# Patient Record
Sex: MALE | Race: WHITE | Employment: OTHER
[De-identification: names, ages, dates, MRNs, and addresses within clinical notes are randomized per-mention and may not be internally consistent; named-entity substitution may affect disease eponyms.]

---

## 2017-01-01 ENCOUNTER — HOME CARE VISIT (OUTPATIENT)
Dept: SCHEDULING | Facility: HOME HEALTH | Age: 82
End: 2017-01-01
Payer: MEDICARE

## 2017-01-01 ENCOUNTER — APPOINTMENT (OUTPATIENT)
Dept: GENERAL RADIOLOGY | Age: 82
DRG: 870 | End: 2017-01-01
Attending: PHYSICIAN ASSISTANT
Payer: MEDICARE

## 2017-01-01 ENCOUNTER — APPOINTMENT (OUTPATIENT)
Dept: CT IMAGING | Age: 82
DRG: 870 | End: 2017-01-01
Attending: EMERGENCY MEDICINE
Payer: MEDICARE

## 2017-01-01 ENCOUNTER — HOME CARE VISIT (OUTPATIENT)
Dept: HOSPICE | Facility: HOSPICE | Age: 82
End: 2017-01-01
Payer: MEDICARE

## 2017-01-01 ENCOUNTER — APPOINTMENT (OUTPATIENT)
Dept: GENERAL RADIOLOGY | Age: 82
DRG: 870 | End: 2017-01-01
Attending: HOSPITALIST
Payer: MEDICARE

## 2017-01-01 ENCOUNTER — APPOINTMENT (OUTPATIENT)
Dept: GENERAL RADIOLOGY | Age: 82
DRG: 870 | End: 2017-01-01
Attending: EMERGENCY MEDICINE
Payer: MEDICARE

## 2017-01-01 ENCOUNTER — HOSPITAL ENCOUNTER (INPATIENT)
Age: 82
LOS: 7 days | Discharge: NURSING, REHAB OR LONG-TERM CARE | DRG: 870 | End: 2017-03-13
Attending: EMERGENCY MEDICINE | Admitting: HOSPITALIST
Payer: MEDICARE

## 2017-01-01 ENCOUNTER — HOSPICE ADMISSION (OUTPATIENT)
Dept: HOSPICE | Facility: HOSPICE | Age: 82
End: 2017-01-01
Payer: MEDICARE

## 2017-01-01 ENCOUNTER — APPOINTMENT (OUTPATIENT)
Dept: ULTRASOUND IMAGING | Age: 82
DRG: 870 | End: 2017-01-01
Attending: INTERNAL MEDICINE
Payer: MEDICARE

## 2017-01-01 VITALS
OXYGEN SATURATION: 98 % | RESPIRATION RATE: 16 BRPM | OXYGEN SATURATION: 84 % | RESPIRATION RATE: 14 BRPM | TEMPERATURE: 98.2 F | DIASTOLIC BLOOD PRESSURE: 42 MMHG | DIASTOLIC BLOOD PRESSURE: 50 MMHG | SYSTOLIC BLOOD PRESSURE: 80 MMHG | TEMPERATURE: 98.2 F | SYSTOLIC BLOOD PRESSURE: 90 MMHG | HEART RATE: 88 BPM | HEART RATE: 68 BPM

## 2017-01-01 VITALS
TEMPERATURE: 98.4 F | SYSTOLIC BLOOD PRESSURE: 110 MMHG | HEIGHT: 74 IN | HEART RATE: 79 BPM | OXYGEN SATURATION: 99 % | WEIGHT: 158.51 LBS | RESPIRATION RATE: 16 BRPM | DIASTOLIC BLOOD PRESSURE: 83 MMHG | BODY MASS INDEX: 20.34 KG/M2

## 2017-01-01 DIAGNOSIS — G93.40 ENCEPHALOPATHY ACUTE: ICD-10-CM

## 2017-01-01 DIAGNOSIS — A41.9 SEPSIS, DUE TO UNSPECIFIED ORGANISM: Primary | ICD-10-CM

## 2017-01-01 DIAGNOSIS — R53.81 DEBILITY: ICD-10-CM

## 2017-01-01 DIAGNOSIS — N30.01 ACUTE CYSTITIS WITH HEMATURIA: ICD-10-CM

## 2017-01-01 DIAGNOSIS — J96.90 RESPIRATORY FAILURE, UNSPECIFIED CHRONICITY, UNSPECIFIED WHETHER WITH HYPOXIA OR HYPERCAPNIA (HCC): ICD-10-CM

## 2017-01-01 DIAGNOSIS — R41.89 UNRESPONSIVE: ICD-10-CM

## 2017-01-01 LAB
ABO + RH BLD: NORMAL
ALBUMIN SERPL BCP-MCNC: 1.7 G/DL (ref 3.4–5)
ALBUMIN SERPL BCP-MCNC: 2 G/DL (ref 3.4–5)
ALBUMIN SERPL BCP-MCNC: 2.1 G/DL (ref 3.4–5)
ALBUMIN SERPL BCP-MCNC: 3.1 G/DL (ref 3.4–5)
ALBUMIN/GLOB SERPL: 0.6 {RATIO} (ref 0.8–1.7)
ALBUMIN/GLOB SERPL: 0.7 {RATIO} (ref 0.8–1.7)
ALBUMIN/GLOB SERPL: 0.8 {RATIO} (ref 0.8–1.7)
ALBUMIN/GLOB SERPL: 0.8 {RATIO} (ref 0.8–1.7)
ALP SERPL-CCNC: 103 U/L (ref 45–117)
ALP SERPL-CCNC: 69 U/L (ref 45–117)
ALP SERPL-CCNC: 79 U/L (ref 45–117)
ALP SERPL-CCNC: 95 U/L (ref 45–117)
ALT SERPL-CCNC: 35 U/L (ref 16–61)
ALT SERPL-CCNC: 36 U/L (ref 16–61)
ALT SERPL-CCNC: 40 U/L (ref 16–61)
ALT SERPL-CCNC: 51 U/L (ref 16–61)
AMMONIA PLAS-SCNC: 34 UMOL/L (ref 11–32)
ANION GAP BLD CALC-SCNC: 11 MMOL/L (ref 3–18)
ANION GAP BLD CALC-SCNC: 14 MMOL/L (ref 3–18)
ANION GAP BLD CALC-SCNC: 18 MMOL/L (ref 10–20)
ANION GAP BLD CALC-SCNC: 7 MMOL/L (ref 3–18)
ANION GAP BLD CALC-SCNC: 9 MMOL/L (ref 3–18)
APPEARANCE UR: ABNORMAL
ARTERIAL PATENCY WRIST A: YES
AST SERPL W P-5'-P-CCNC: 36 U/L (ref 15–37)
AST SERPL W P-5'-P-CCNC: 39 U/L (ref 15–37)
AST SERPL W P-5'-P-CCNC: 43 U/L (ref 15–37)
AST SERPL W P-5'-P-CCNC: 48 U/L (ref 15–37)
ATRIAL RATE: 166 BPM
ATRIAL RATE: 65 BPM
BACTERIA SPEC CULT: ABNORMAL
BACTERIA SPEC CULT: NORMAL
BACTERIA SPEC CULT: NORMAL
BACTERIA URNS QL MICRO: ABNORMAL /HPF
BASE DEFICIT BLD-SCNC: 11 MMOL/L
BASE DEFICIT BLD-SCNC: 12 MMOL/L
BASE DEFICIT BLD-SCNC: 5 MMOL/L
BASE DEFICIT BLD-SCNC: 7 MMOL/L
BASOPHILS # BLD AUTO: 0 K/UL (ref 0–0.06)
BASOPHILS # BLD: 0 % (ref 0–2)
BDY SITE: ABNORMAL
BILIRUB SERPL-MCNC: 0.6 MG/DL (ref 0.2–1)
BILIRUB SERPL-MCNC: 0.7 MG/DL (ref 0.2–1)
BILIRUB SERPL-MCNC: 0.8 MG/DL (ref 0.2–1)
BILIRUB SERPL-MCNC: 0.8 MG/DL (ref 0.2–1)
BILIRUB UR QL: NEGATIVE
BLOOD GROUP ANTIBODIES SERPL: NORMAL
BODY TEMPERATURE: 96.8
BODY TEMPERATURE: 98.4
BODY TEMPERATURE: 98.8
BODY TEMPERATURE: 98.8
BUN BLD-MCNC: 84 MG/DL (ref 7–18)
BUN SERPL-MCNC: 19 MG/DL (ref 7–18)
BUN SERPL-MCNC: 22 MG/DL (ref 7–18)
BUN SERPL-MCNC: 35 MG/DL (ref 7–18)
BUN SERPL-MCNC: 53 MG/DL (ref 7–18)
BUN SERPL-MCNC: 81 MG/DL (ref 7–18)
BUN SERPL-MCNC: 87 MG/DL (ref 7–18)
BUN/CREAT SERPL: 17 (ref 12–20)
BUN/CREAT SERPL: 19 (ref 12–20)
BUN/CREAT SERPL: 23 (ref 12–20)
BUN/CREAT SERPL: 25 (ref 12–20)
BUN/CREAT SERPL: 27 (ref 12–20)
BUN/CREAT SERPL: 31 (ref 12–20)
CA-I BLD-MCNC: 1.15 MMOL/L (ref 1.12–1.32)
CALCIUM SERPL-MCNC: 7.2 MG/DL (ref 8.5–10.1)
CALCIUM SERPL-MCNC: 7.5 MG/DL (ref 8.5–10.1)
CALCIUM SERPL-MCNC: 7.5 MG/DL (ref 8.5–10.1)
CALCIUM SERPL-MCNC: 7.7 MG/DL (ref 8.5–10.1)
CALCIUM SERPL-MCNC: 7.9 MG/DL (ref 8.5–10.1)
CALCIUM SERPL-MCNC: 9.8 MG/DL (ref 8.5–10.1)
CALCULATED P AXIS, ECG09: 58 DEGREES
CALCULATED P AXIS, ECG09: 87 DEGREES
CALCULATED R AXIS, ECG10: -2 DEGREES
CALCULATED R AXIS, ECG10: -38 DEGREES
CALCULATED T AXIS, ECG11: -160 DEGREES
CALCULATED T AXIS, ECG11: 74 DEGREES
CHLORIDE BLD-SCNC: 126 MMOL/L (ref 100–108)
CHLORIDE SERPL-SCNC: 111 MMOL/L (ref 100–108)
CHLORIDE SERPL-SCNC: 112 MMOL/L (ref 100–108)
CHLORIDE SERPL-SCNC: 121 MMOL/L (ref 100–108)
CHLORIDE SERPL-SCNC: 125 MMOL/L (ref 100–108)
CHLORIDE SERPL-SCNC: 125 MMOL/L (ref 100–108)
CHLORIDE SERPL-SCNC: 133 MMOL/L (ref 100–108)
CK MB CFR SERPL CALC: 3.3 % (ref 0–4)
CK MB CFR SERPL CALC: 4.1 % (ref 0–4)
CK MB CFR SERPL CALC: 4.6 % (ref 0–4)
CK MB SERPL-MCNC: 10.5 NG/ML (ref 5–25)
CK MB SERPL-MCNC: 11.8 NG/ML (ref 5–25)
CK MB SERPL-MCNC: 7.6 NG/ML (ref 5–25)
CK SERPL-CCNC: 232 U/L (ref 39–308)
CK SERPL-CCNC: 255 U/L (ref 39–308)
CK SERPL-CCNC: 258 U/L (ref 39–308)
CO2 BLD-SCNC: 19 MMOL/L (ref 19–24)
CO2 SERPL-SCNC: 17 MMOL/L (ref 21–32)
CO2 SERPL-SCNC: 19 MMOL/L (ref 21–32)
CO2 SERPL-SCNC: 19 MMOL/L (ref 21–32)
CO2 SERPL-SCNC: 22 MMOL/L (ref 21–32)
CO2 SERPL-SCNC: 25 MMOL/L (ref 21–32)
CO2 SERPL-SCNC: 26 MMOL/L (ref 21–32)
COLOR UR: ABNORMAL
CREAT SERPL-MCNC: 1.15 MG/DL (ref 0.6–1.3)
CREAT SERPL-MCNC: 1.16 MG/DL (ref 0.6–1.3)
CREAT SERPL-MCNC: 1.45 MG/DL (ref 0.6–1.3)
CREAT SERPL-MCNC: 2.16 MG/DL (ref 0.6–1.3)
CREAT SERPL-MCNC: 2.59 MG/DL (ref 0.6–1.3)
CREAT SERPL-MCNC: 3.24 MG/DL (ref 0.6–1.3)
CREAT UR-MCNC: 3 MG/DL (ref 0.6–1.3)
CREAT UR-MCNC: 52.9 MG/DL (ref 30–125)
DATE LAST DOSE: NORMAL
DIAGNOSIS, 93000: NORMAL
DIAGNOSIS, 93000: NORMAL
DIFFERENTIAL METHOD BLD: ABNORMAL
EOSINOPHIL # BLD: 0 K/UL (ref 0–0.4)
EOSINOPHIL # BLD: 0.2 K/UL (ref 0–0.4)
EOSINOPHIL # BLD: 0.4 K/UL (ref 0–0.4)
EOSINOPHIL # BLD: 0.4 K/UL (ref 0–0.4)
EOSINOPHIL NFR BLD: 0 % (ref 0–5)
EOSINOPHIL NFR BLD: 2 % (ref 0–5)
EOSINOPHIL NFR BLD: 5 % (ref 0–5)
EOSINOPHIL NFR BLD: 5 % (ref 0–5)
EPITH CASTS URNS QL MICRO: ABNORMAL /LPF (ref 0–5)
ERYTHROCYTE [DISTWIDTH] IN BLOOD BY AUTOMATED COUNT: 13.7 % (ref 11.6–14.5)
ERYTHROCYTE [DISTWIDTH] IN BLOOD BY AUTOMATED COUNT: 13.7 % (ref 11.6–14.5)
ERYTHROCYTE [DISTWIDTH] IN BLOOD BY AUTOMATED COUNT: 13.8 % (ref 11.6–14.5)
ERYTHROCYTE [DISTWIDTH] IN BLOOD BY AUTOMATED COUNT: 13.9 % (ref 11.6–14.5)
ERYTHROCYTE [DISTWIDTH] IN BLOOD BY AUTOMATED COUNT: 13.9 % (ref 11.6–14.5)
ERYTHROCYTE [DISTWIDTH] IN BLOOD BY AUTOMATED COUNT: 14.1 % (ref 11.6–14.5)
EST. AVERAGE GLUCOSE BLD GHB EST-MCNC: 131 MG/DL
GAS FLOW.O2 O2 DELIVERY SYS: ABNORMAL L/MIN
GAS FLOW.O2 SETTING OXYMISER: 10 BPM
GLOBULIN SER CALC-MCNC: 2.8 G/DL (ref 2–4)
GLOBULIN SER CALC-MCNC: 3 G/DL (ref 2–4)
GLOBULIN SER CALC-MCNC: 3 G/DL (ref 2–4)
GLOBULIN SER CALC-MCNC: 3.9 G/DL (ref 2–4)
GLUCOSE BLD STRIP.AUTO-MCNC: 108 MG/DL (ref 70–110)
GLUCOSE BLD STRIP.AUTO-MCNC: 112 MG/DL (ref 70–110)
GLUCOSE BLD STRIP.AUTO-MCNC: 118 MG/DL (ref 70–110)
GLUCOSE BLD STRIP.AUTO-MCNC: 135 MG/DL (ref 70–110)
GLUCOSE BLD STRIP.AUTO-MCNC: 138 MG/DL (ref 70–110)
GLUCOSE BLD STRIP.AUTO-MCNC: 140 MG/DL (ref 70–110)
GLUCOSE BLD STRIP.AUTO-MCNC: 141 MG/DL (ref 70–110)
GLUCOSE BLD STRIP.AUTO-MCNC: 144 MG/DL (ref 70–110)
GLUCOSE BLD STRIP.AUTO-MCNC: 153 MG/DL (ref 70–110)
GLUCOSE BLD STRIP.AUTO-MCNC: 158 MG/DL (ref 70–110)
GLUCOSE BLD STRIP.AUTO-MCNC: 159 MG/DL (ref 70–110)
GLUCOSE BLD STRIP.AUTO-MCNC: 161 MG/DL (ref 74–106)
GLUCOSE BLD STRIP.AUTO-MCNC: 163 MG/DL (ref 70–110)
GLUCOSE BLD STRIP.AUTO-MCNC: 167 MG/DL (ref 70–110)
GLUCOSE BLD STRIP.AUTO-MCNC: 174 MG/DL (ref 70–110)
GLUCOSE BLD STRIP.AUTO-MCNC: 175 MG/DL (ref 70–110)
GLUCOSE BLD STRIP.AUTO-MCNC: 203 MG/DL (ref 70–110)
GLUCOSE BLD STRIP.AUTO-MCNC: 237 MG/DL (ref 70–110)
GLUCOSE BLD STRIP.AUTO-MCNC: 90 MG/DL (ref 70–110)
GLUCOSE SERPL-MCNC: 113 MG/DL (ref 74–99)
GLUCOSE SERPL-MCNC: 121 MG/DL (ref 74–99)
GLUCOSE SERPL-MCNC: 136 MG/DL (ref 74–99)
GLUCOSE SERPL-MCNC: 146 MG/DL (ref 74–99)
GLUCOSE SERPL-MCNC: 157 MG/DL (ref 74–99)
GLUCOSE SERPL-MCNC: 175 MG/DL (ref 74–99)
GLUCOSE UR STRIP.AUTO-MCNC: NEGATIVE MG/DL
GRAM STN SPEC: ABNORMAL
HBA1C MFR BLD: 6.2 % (ref 4.2–5.6)
HCO3 BLD-SCNC: 14.4 MMOL/L (ref 22–26)
HCO3 BLD-SCNC: 16.4 MMOL/L (ref 22–26)
HCO3 BLD-SCNC: 18.7 MMOL/L (ref 22–26)
HCO3 BLD-SCNC: 19.6 MMOL/L (ref 22–26)
HCT VFR BLD AUTO: 32.2 % (ref 36–48)
HCT VFR BLD AUTO: 33.8 % (ref 36–48)
HCT VFR BLD AUTO: 33.8 % (ref 36–48)
HCT VFR BLD AUTO: 33.9 % (ref 36–48)
HCT VFR BLD AUTO: 38.7 % (ref 36–48)
HCT VFR BLD AUTO: 43.3 % (ref 36–48)
HCT VFR BLD CALC: 42 % (ref 36–49)
HGB BLD-MCNC: 10.7 G/DL (ref 13–16)
HGB BLD-MCNC: 10.8 G/DL (ref 13–16)
HGB BLD-MCNC: 11.1 G/DL (ref 13–16)
HGB BLD-MCNC: 11.2 G/DL (ref 13–16)
HGB BLD-MCNC: 12.2 G/DL (ref 13–16)
HGB BLD-MCNC: 14 G/DL (ref 13–16)
HGB BLD-MCNC: 14.3 G/DL (ref 12–16)
HGB UR QL STRIP: ABNORMAL
INR PPP: 1.4 (ref 0.8–1.2)
INR PPP: 1.4 (ref 0.8–1.2)
INSPIRATION.DURATION SETTING TIME VENT: 1 SEC
INSPIRATION.DURATION SETTING TIME VENT: 30 SEC
KETONES UR QL STRIP.AUTO: ABNORMAL MG/DL
LACTATE BLD-SCNC: 1 MMOL/L (ref 0.4–2)
LACTATE BLD-SCNC: 4 MMOL/L (ref 0.4–2)
LEUKOCYTE ESTERASE UR QL STRIP.AUTO: ABNORMAL
LYMPHOCYTES # BLD AUTO: 14 % (ref 21–52)
LYMPHOCYTES # BLD AUTO: 15 % (ref 21–52)
LYMPHOCYTES # BLD AUTO: 16 % (ref 21–52)
LYMPHOCYTES # BLD AUTO: 19 % (ref 21–52)
LYMPHOCYTES # BLD: 1.3 K/UL (ref 0.9–3.6)
LYMPHOCYTES # BLD: 1.3 K/UL (ref 0.9–3.6)
LYMPHOCYTES # BLD: 1.4 K/UL (ref 0.9–3.6)
LYMPHOCYTES # BLD: 1.7 K/UL (ref 0.9–3.6)
MAGNESIUM SERPL-MCNC: 1.8 MG/DL (ref 1.8–2.4)
MAGNESIUM SERPL-MCNC: 2.1 MG/DL (ref 1.8–2.4)
MAGNESIUM SERPL-MCNC: 2.1 MG/DL (ref 1.8–2.4)
MAGNESIUM SERPL-MCNC: 2.2 MG/DL (ref 1.8–2.4)
MAGNESIUM SERPL-MCNC: 2.3 MG/DL (ref 1.8–2.4)
MAGNESIUM SERPL-MCNC: 3 MG/DL (ref 1.8–2.4)
MCH RBC QN AUTO: 32 PG (ref 24–34)
MCH RBC QN AUTO: 32.1 PG (ref 24–34)
MCH RBC QN AUTO: 32.2 PG (ref 24–34)
MCH RBC QN AUTO: 32.3 PG (ref 24–34)
MCH RBC QN AUTO: 32.7 PG (ref 24–34)
MCH RBC QN AUTO: 32.9 PG (ref 24–34)
MCHC RBC AUTO-ENTMCNC: 31.5 G/DL (ref 31–37)
MCHC RBC AUTO-ENTMCNC: 32 G/DL (ref 31–37)
MCHC RBC AUTO-ENTMCNC: 32.3 G/DL (ref 31–37)
MCHC RBC AUTO-ENTMCNC: 32.8 G/DL (ref 31–37)
MCHC RBC AUTO-ENTMCNC: 33 G/DL (ref 31–37)
MCHC RBC AUTO-ENTMCNC: 33.2 G/DL (ref 31–37)
MCV RBC AUTO: 100.6 FL (ref 74–97)
MCV RBC AUTO: 101.6 FL (ref 74–97)
MCV RBC AUTO: 101.6 FL (ref 74–97)
MCV RBC AUTO: 97.3 FL (ref 74–97)
MCV RBC AUTO: 97.4 FL (ref 74–97)
MCV RBC AUTO: 99.7 FL (ref 74–97)
MONOCYTES # BLD: 0.5 K/UL (ref 0.05–1.2)
MONOCYTES # BLD: 0.5 K/UL (ref 0.05–1.2)
MONOCYTES # BLD: 0.6 K/UL (ref 0.05–1.2)
MONOCYTES # BLD: 0.6 K/UL (ref 0.05–1.2)
MONOCYTES NFR BLD AUTO: 6 % (ref 3–10)
MONOCYTES NFR BLD AUTO: 6 % (ref 3–10)
MONOCYTES NFR BLD AUTO: 7 % (ref 3–10)
MONOCYTES NFR BLD AUTO: 7 % (ref 3–10)
NEUTS SEG # BLD: 5.5 K/UL (ref 1.8–8)
NEUTS SEG # BLD: 6 K/UL (ref 1.8–8)
NEUTS SEG # BLD: 6.2 K/UL (ref 1.8–8)
NEUTS SEG # BLD: 8 K/UL (ref 1.8–8)
NEUTS SEG NFR BLD AUTO: 72 % (ref 40–73)
NEUTS SEG NFR BLD AUTO: 72 % (ref 40–73)
NEUTS SEG NFR BLD AUTO: 74 % (ref 40–73)
NEUTS SEG NFR BLD AUTO: 80 % (ref 40–73)
NITRITE UR QL STRIP.AUTO: NEGATIVE
O2/TOTAL GAS SETTING VFR VENT: 0.3 %
O2/TOTAL GAS SETTING VFR VENT: 30 %
O2/TOTAL GAS SETTING VFR VENT: 30 %
O2/TOTAL GAS SETTING VFR VENT: 60 %
P-R INTERVAL, ECG05: 130 MS
P-R INTERVAL, ECG05: 170 MS
PCO2 BLD: 30 MMHG (ref 35–45)
PCO2 BLD: 32.4 MMHG (ref 35–45)
PCO2 BLD: 33 MMHG (ref 35–45)
PCO2 BLD: 36.1 MMHG (ref 35–45)
PEEP RESPIRATORY: 5 CMH2O
PH BLD: 7.26 [PH] (ref 7.35–7.45)
PH BLD: 7.29 [PH] (ref 7.35–7.45)
PH BLD: 7.36 [PH] (ref 7.35–7.45)
PH BLD: 7.39 [PH] (ref 7.35–7.45)
PH UR STRIP: >8.5 [PH] (ref 5–8)
PHOSPHATE SERPL-MCNC: 1.4 MG/DL (ref 2.5–4.9)
PHOSPHATE SERPL-MCNC: 1.5 MG/DL (ref 2.5–4.9)
PHOSPHATE SERPL-MCNC: 1.6 MG/DL (ref 2.5–4.9)
PHOSPHATE SERPL-MCNC: 1.7 MG/DL (ref 2.5–4.9)
PHOSPHATE SERPL-MCNC: 2 MG/DL (ref 2.5–4.9)
PIP ISTAT,IPIP: 11
PLATELET # BLD AUTO: 149 K/UL (ref 135–420)
PLATELET # BLD AUTO: 151 K/UL (ref 135–420)
PLATELET # BLD AUTO: 161 K/UL (ref 135–420)
PLATELET # BLD AUTO: 164 K/UL (ref 135–420)
PLATELET # BLD AUTO: 177 K/UL (ref 135–420)
PLATELET # BLD AUTO: 232 K/UL (ref 135–420)
PMV BLD AUTO: 10.9 FL (ref 9.2–11.8)
PMV BLD AUTO: 11 FL (ref 9.2–11.8)
PMV BLD AUTO: 11.2 FL (ref 9.2–11.8)
PMV BLD AUTO: 11.3 FL (ref 9.2–11.8)
PMV BLD AUTO: 11.3 FL (ref 9.2–11.8)
PMV BLD AUTO: 11.4 FL (ref 9.2–11.8)
PO2 BLD: 136 MMHG (ref 80–100)
PO2 BLD: 144 MMHG (ref 80–100)
PO2 BLD: 151 MMHG (ref 80–100)
PO2 BLD: 345 MMHG (ref 80–100)
POTASSIUM BLD-SCNC: 4.3 MMOL/L (ref 3.5–5.5)
POTASSIUM SERPL-SCNC: 2.9 MMOL/L (ref 3.5–5.5)
POTASSIUM SERPL-SCNC: 3 MMOL/L (ref 3.5–5.5)
POTASSIUM SERPL-SCNC: 3.1 MMOL/L (ref 3.5–5.5)
POTASSIUM SERPL-SCNC: 3.6 MMOL/L (ref 3.5–5.5)
POTASSIUM SERPL-SCNC: 3.7 MMOL/L (ref 3.5–5.5)
POTASSIUM SERPL-SCNC: 3.8 MMOL/L (ref 3.5–5.5)
POTASSIUM SERPL-SCNC: 4.1 MMOL/L (ref 3.5–5.5)
POTASSIUM SERPL-SCNC: 4.4 MMOL/L (ref 3.5–5.5)
POTASSIUM SERPL-SCNC: 5 MMOL/L (ref 3.5–5.5)
PROT SERPL-MCNC: 4.7 G/DL (ref 6.4–8.2)
PROT SERPL-MCNC: 4.9 G/DL (ref 6.4–8.2)
PROT SERPL-MCNC: 5 G/DL (ref 6.4–8.2)
PROT SERPL-MCNC: 7 G/DL (ref 6.4–8.2)
PROT UR STRIP-MCNC: 300 MG/DL
PROTHROMBIN TIME: 16.9 SEC (ref 11.5–15.2)
PROTHROMBIN TIME: 17 SEC (ref 11.5–15.2)
Q-T INTERVAL, ECG07: 250 MS
Q-T INTERVAL, ECG07: 498 MS
QRS DURATION, ECG06: 106 MS
QRS DURATION, ECG06: 110 MS
QTC CALCULATION (BEZET), ECG08: 410 MS
QTC CALCULATION (BEZET), ECG08: 517 MS
RBC # BLD AUTO: 3.31 M/UL (ref 4.7–5.5)
RBC # BLD AUTO: 3.36 M/UL (ref 4.7–5.5)
RBC # BLD AUTO: 3.39 M/UL (ref 4.7–5.5)
RBC # BLD AUTO: 3.48 M/UL (ref 4.7–5.5)
RBC # BLD AUTO: 3.81 M/UL (ref 4.7–5.5)
RBC # BLD AUTO: 4.26 M/UL (ref 4.7–5.5)
RBC #/AREA URNS HPF: ABNORMAL /HPF (ref 0–5)
REPORTED DOSE,DOSE: NORMAL UNITS
REPORTED DOSE/TIME,TMG: 1400
SAO2 % BLD: 100 % (ref 92–97)
SAO2 % BLD: 99 % (ref 92–97)
SERVICE CMNT-IMP: ABNORMAL
SERVICE CMNT-IMP: NORMAL
SERVICE CMNT-IMP: NORMAL
SODIUM BLD-SCNC: 158 MMOL/L (ref 136–145)
SODIUM SERPL-SCNC: 144 MMOL/L (ref 136–145)
SODIUM SERPL-SCNC: 144 MMOL/L (ref 136–145)
SODIUM SERPL-SCNC: 150 MMOL/L (ref 136–145)
SODIUM SERPL-SCNC: 155 MMOL/L (ref 136–145)
SODIUM SERPL-SCNC: 156 MMOL/L (ref 136–145)
SODIUM SERPL-SCNC: 161 MMOL/L (ref 136–145)
SODIUM SERPL-SCNC: 162 MMOL/L (ref 136–145)
SODIUM UR-SCNC: 107 MMOL/L (ref 20–110)
SP GR UR REFRACTOMETRY: 1.02 (ref 1–1.03)
SPECIMEN EXP DATE BLD: NORMAL
SPECIMEN TYPE: ABNORMAL
T4 FREE SERPL-MCNC: 1 NG/DL (ref 0.7–1.5)
TOTAL RESP. RATE, ITRR: 13
TOTAL RESP. RATE, ITRR: 17
TOTAL RESP. RATE, ITRR: 18
TOTAL RESP. RATE, ITRR: 22
TROPONIN I BLD-MCNC: 0.04 NG/ML (ref 0–0.08)
TROPONIN I SERPL-MCNC: 0.69 NG/ML (ref 0–0.04)
TROPONIN I SERPL-MCNC: 0.97 NG/ML (ref 0–0.04)
TROPONIN I SERPL-MCNC: 1.21 NG/ML (ref 0–0.04)
TROPONIN I SERPL-MCNC: 1.39 NG/ML (ref 0–0.04)
TSH SERPL DL<=0.05 MIU/L-ACNC: 3.08 UIU/ML (ref 0.36–3.74)
UROBILINOGEN UR QL STRIP.AUTO: 1 EU/DL (ref 0.2–1)
VANCOMYCIN TROUGH SERPL-MCNC: 12.7 UG/ML (ref 10–20)
VENTILATION MODE VENT: ABNORMAL
VENTRICULAR RATE, ECG03: 162 BPM
VENTRICULAR RATE, ECG03: 65 BPM
VOLUME CONTROL PLUS IVLCP: YES
VT SETTING VENT: 400 ML
VT SETTING VENT: 450 ML
VT SETTING VENT: 450 ML
VT SETTING VENT: 500 ML
WBC # BLD AUTO: 10 K/UL (ref 4.6–13.2)
WBC # BLD AUTO: 7.7 K/UL (ref 4.6–13.2)
WBC # BLD AUTO: 7.8 K/UL (ref 4.6–13.2)
WBC # BLD AUTO: 8.1 K/UL (ref 4.6–13.2)
WBC # BLD AUTO: 8.7 K/UL (ref 4.6–13.2)
WBC # BLD AUTO: 9.4 K/UL (ref 4.6–13.2)
WBC URNS QL MICRO: ABNORMAL /HPF (ref 0–4)

## 2017-01-01 PROCEDURE — 74011250636 HC RX REV CODE- 250/636: Performed by: INTERNAL MEDICINE

## 2017-01-01 PROCEDURE — 77030018786 HC NDL GD F/USND BARD -B

## 2017-01-01 PROCEDURE — 74000 XR ABD PORT  1 V: CPT

## 2017-01-01 PROCEDURE — 74011250637 HC RX REV CODE- 250/637: Performed by: HOSPITALIST

## 2017-01-01 PROCEDURE — 83735 ASSAY OF MAGNESIUM: CPT | Performed by: HOSPITALIST

## 2017-01-01 PROCEDURE — 87077 CULTURE AEROBIC IDENTIFY: CPT | Performed by: PHYSICIAN ASSISTANT

## 2017-01-01 PROCEDURE — 65270000029 HC RM PRIVATE

## 2017-01-01 PROCEDURE — 02HV33Z INSERTION OF INFUSION DEVICE INTO SUPERIOR VENA CAVA, PERCUTANEOUS APPROACH: ICD-10-PCS | Performed by: HOSPITALIST

## 2017-01-01 PROCEDURE — 81001 URINALYSIS AUTO W/SCOPE: CPT | Performed by: EMERGENCY MEDICINE

## 2017-01-01 PROCEDURE — 77030008771 HC TU NG SALEM SUMP -A

## 2017-01-01 PROCEDURE — 94002 VENT MGMT INPAT INIT DAY: CPT

## 2017-01-01 PROCEDURE — 36592 COLLECT BLOOD FROM PICC: CPT

## 2017-01-01 PROCEDURE — 94003 VENT MGMT INPAT SUBQ DAY: CPT

## 2017-01-01 PROCEDURE — 74011250636 HC RX REV CODE- 250/636: Performed by: HOSPITALIST

## 2017-01-01 PROCEDURE — 80053 COMPREHEN METABOLIC PANEL: CPT | Performed by: EMERGENCY MEDICINE

## 2017-01-01 PROCEDURE — 96365 THER/PROPH/DIAG IV INF INIT: CPT

## 2017-01-01 PROCEDURE — 77030018719 HC DRSG PTCH ANTIMIC J&J -A

## 2017-01-01 PROCEDURE — 84300 ASSAY OF URINE SODIUM: CPT | Performed by: INTERNAL MEDICINE

## 2017-01-01 PROCEDURE — 5A1955Z RESPIRATORY VENTILATION, GREATER THAN 96 CONSECUTIVE HOURS: ICD-10-PCS | Performed by: HOSPITALIST

## 2017-01-01 PROCEDURE — 74011000250 HC RX REV CODE- 250: Performed by: INTERNAL MEDICINE

## 2017-01-01 PROCEDURE — 83735 ASSAY OF MAGNESIUM: CPT | Performed by: PHYSICIAN ASSISTANT

## 2017-01-01 PROCEDURE — 85027 COMPLETE CBC AUTOMATED: CPT | Performed by: PHYSICIAN ASSISTANT

## 2017-01-01 PROCEDURE — 0651 HSPC ROUTINE HOME CARE

## 2017-01-01 PROCEDURE — 36600 WITHDRAWAL OF ARTERIAL BLOOD: CPT

## 2017-01-01 PROCEDURE — 80053 COMPREHEN METABOLIC PANEL: CPT | Performed by: HOSPITALIST

## 2017-01-01 PROCEDURE — 3336590001 HSPC ROOM AND BOARD

## 2017-01-01 PROCEDURE — 74011000250 HC RX REV CODE- 250: Performed by: HOSPITALIST

## 2017-01-01 PROCEDURE — 96368 THER/DIAG CONCURRENT INF: CPT

## 2017-01-01 PROCEDURE — 65610000006 HC RM INTENSIVE CARE

## 2017-01-01 PROCEDURE — 82570 ASSAY OF URINE CREATININE: CPT | Performed by: INTERNAL MEDICINE

## 2017-01-01 PROCEDURE — 70450 CT HEAD/BRAIN W/O DYE: CPT

## 2017-01-01 PROCEDURE — 77030011256 HC DRSG MEPILEX <16IN NO BORD MOLN -A

## 2017-01-01 PROCEDURE — 74011636637 HC RX REV CODE- 636/637: Performed by: HOSPITALIST

## 2017-01-01 PROCEDURE — C9113 INJ PANTOPRAZOLE SODIUM, VIA: HCPCS | Performed by: INTERNAL MEDICINE

## 2017-01-01 PROCEDURE — 71010 XR CHEST PORT: CPT

## 2017-01-01 PROCEDURE — 85027 COMPLETE CBC AUTOMATED: CPT | Performed by: INTERNAL MEDICINE

## 2017-01-01 PROCEDURE — 82962 GLUCOSE BLOOD TEST: CPT

## 2017-01-01 PROCEDURE — 84132 ASSAY OF SERUM POTASSIUM: CPT | Performed by: HOSPITALIST

## 2017-01-01 PROCEDURE — 74011000258 HC RX REV CODE- 258: Performed by: HOSPITALIST

## 2017-01-01 PROCEDURE — 76937 US GUIDE VASCULAR ACCESS: CPT | Performed by: INTERNAL MEDICINE

## 2017-01-01 PROCEDURE — 80047 BASIC METABLC PNL IONIZED CA: CPT

## 2017-01-01 PROCEDURE — 93306 TTE W/DOPPLER COMPLETE: CPT

## 2017-01-01 PROCEDURE — 87186 SC STD MICRODIL/AGAR DIL: CPT | Performed by: PHYSICIAN ASSISTANT

## 2017-01-01 PROCEDURE — 80048 BASIC METABOLIC PNL TOTAL CA: CPT | Performed by: INTERNAL MEDICINE

## 2017-01-01 PROCEDURE — G0155 HHCP-SVS OF CSW,EA 15 MIN: HCPCS

## 2017-01-01 PROCEDURE — 74011000258 HC RX REV CODE- 258: Performed by: EMERGENCY MEDICINE

## 2017-01-01 PROCEDURE — C1751 CATH, INF, PER/CENT/MIDLINE: HCPCS

## 2017-01-01 PROCEDURE — 0BH17EZ INSERTION OF ENDOTRACHEAL AIRWAY INTO TRACHEA, VIA NATURAL OR ARTIFICIAL OPENING: ICD-10-PCS | Performed by: HOSPITALIST

## 2017-01-01 PROCEDURE — 85610 PROTHROMBIN TIME: CPT | Performed by: PHYSICIAN ASSISTANT

## 2017-01-01 PROCEDURE — 77030033269 HC SLV COMPR SCD KNE2 CARD -B

## 2017-01-01 PROCEDURE — 87086 URINE CULTURE/COLONY COUNT: CPT | Performed by: EMERGENCY MEDICINE

## 2017-01-01 PROCEDURE — 31500 INSERT EMERGENCY AIRWAY: CPT

## 2017-01-01 PROCEDURE — 77030020250 HC SOL INJ D 5% LFCR 1000ML BG LF

## 2017-01-01 PROCEDURE — G0299 HHS/HOSPICE OF RN EA 15 MIN: HCPCS

## 2017-01-01 PROCEDURE — 83735 ASSAY OF MAGNESIUM: CPT | Performed by: INTERNAL MEDICINE

## 2017-01-01 PROCEDURE — HOSPICE MEDICATION HC HH HOSPICE MEDICATION

## 2017-01-01 PROCEDURE — 84100 ASSAY OF PHOSPHORUS: CPT | Performed by: HOSPITALIST

## 2017-01-01 PROCEDURE — 86900 BLOOD TYPING SEROLOGIC ABO: CPT | Performed by: EMERGENCY MEDICINE

## 2017-01-01 PROCEDURE — 99285 EMERGENCY DEPT VISIT HI MDM: CPT

## 2017-01-01 PROCEDURE — 74011250636 HC RX REV CODE- 250/636: Performed by: EMERGENCY MEDICINE

## 2017-01-01 PROCEDURE — 96367 TX/PROPH/DG ADDL SEQ IV INF: CPT

## 2017-01-01 PROCEDURE — 87077 CULTURE AEROBIC IDENTIFY: CPT | Performed by: EMERGENCY MEDICINE

## 2017-01-01 PROCEDURE — 84295 ASSAY OF SERUM SODIUM: CPT | Performed by: HOSPITALIST

## 2017-01-01 PROCEDURE — 85025 COMPLETE CBC W/AUTO DIFF WBC: CPT | Performed by: HOSPITALIST

## 2017-01-01 PROCEDURE — 82803 BLOOD GASES ANY COMBINATION: CPT

## 2017-01-01 PROCEDURE — 3336500001 HSPC ELECTION

## 2017-01-01 PROCEDURE — 74011000250 HC RX REV CODE- 250: Performed by: EMERGENCY MEDICINE

## 2017-01-01 PROCEDURE — 87186 SC STD MICRODIL/AGAR DIL: CPT | Performed by: EMERGENCY MEDICINE

## 2017-01-01 PROCEDURE — 84100 ASSAY OF PHOSPHORUS: CPT | Performed by: PHYSICIAN ASSISTANT

## 2017-01-01 PROCEDURE — 77030020263 HC SOL INJ SOD CL0.9% LFCR 1000ML

## 2017-01-01 PROCEDURE — 74011000250 HC RX REV CODE- 250: Performed by: PHYSICIAN ASSISTANT

## 2017-01-01 PROCEDURE — 83605 ASSAY OF LACTIC ACID: CPT

## 2017-01-01 PROCEDURE — PE102 HC HSPC R&B RUG RATE

## 2017-01-01 PROCEDURE — 76450000000

## 2017-01-01 PROCEDURE — 93005 ELECTROCARDIOGRAM TRACING: CPT

## 2017-01-01 PROCEDURE — 36569 INSJ PICC 5 YR+ W/O IMAGING: CPT | Performed by: INTERNAL MEDICINE

## 2017-01-01 PROCEDURE — 77030005534 HC CATH URETH FOL TEMP SENS SIMS -B

## 2017-01-01 PROCEDURE — G0156 HHCP-SVS OF AIDE,EA 15 MIN: HCPCS

## 2017-01-01 PROCEDURE — 74011250637 HC RX REV CODE- 250/637: Performed by: PHYSICIAN ASSISTANT

## 2017-01-01 PROCEDURE — 51702 INSERT TEMP BLADDER CATH: CPT

## 2017-01-01 PROCEDURE — 83036 HEMOGLOBIN GLYCOSYLATED A1C: CPT | Performed by: HOSPITALIST

## 2017-01-01 PROCEDURE — 84484 ASSAY OF TROPONIN QUANT: CPT | Performed by: INTERNAL MEDICINE

## 2017-01-01 PROCEDURE — 82550 ASSAY OF CK (CPK): CPT | Performed by: HOSPITALIST

## 2017-01-01 PROCEDURE — 87070 CULTURE OTHR SPECIMN AEROBIC: CPT | Performed by: PHYSICIAN ASSISTANT

## 2017-01-01 PROCEDURE — 84439 ASSAY OF FREE THYROXINE: CPT | Performed by: INTERNAL MEDICINE

## 2017-01-01 PROCEDURE — 96361 HYDRATE IV INFUSION ADD-ON: CPT

## 2017-01-01 PROCEDURE — 74011250636 HC RX REV CODE- 250/636: Performed by: PHYSICIAN ASSISTANT

## 2017-01-01 PROCEDURE — 80202 ASSAY OF VANCOMYCIN: CPT | Performed by: PHYSICIAN ASSISTANT

## 2017-01-01 PROCEDURE — 84484 ASSAY OF TROPONIN QUANT: CPT

## 2017-01-01 PROCEDURE — 85025 COMPLETE CBC W/AUTO DIFF WBC: CPT | Performed by: EMERGENCY MEDICINE

## 2017-01-01 PROCEDURE — 97161 PT EVAL LOW COMPLEX 20 MIN: CPT

## 2017-01-01 PROCEDURE — 36415 COLL VENOUS BLD VENIPUNCTURE: CPT | Performed by: INTERNAL MEDICINE

## 2017-01-01 PROCEDURE — 77030032490 HC SLV COMPR SCD KNE COVD -B

## 2017-01-01 PROCEDURE — 96366 THER/PROPH/DIAG IV INF ADDON: CPT

## 2017-01-01 PROCEDURE — 84443 ASSAY THYROID STIM HORMONE: CPT | Performed by: INTERNAL MEDICINE

## 2017-01-01 PROCEDURE — 74011250636 HC RX REV CODE- 250/636

## 2017-01-01 PROCEDURE — 87040 BLOOD CULTURE FOR BACTERIA: CPT | Performed by: EMERGENCY MEDICINE

## 2017-01-01 PROCEDURE — 3330220001 HC HSPC R&B RUG RATE

## 2017-01-01 PROCEDURE — L4396 STATIC OR DYNAMI AFO PRE CST: HCPCS

## 2017-01-01 PROCEDURE — 96360 HYDRATION IV INFUSION INIT: CPT

## 2017-01-01 PROCEDURE — 76775 US EXAM ABDO BACK WALL LIM: CPT

## 2017-01-01 PROCEDURE — 80048 BASIC METABOLIC PNL TOTAL CA: CPT | Performed by: PHYSICIAN ASSISTANT

## 2017-01-01 PROCEDURE — 74011000258 HC RX REV CODE- 258: Performed by: PHYSICIAN ASSISTANT

## 2017-01-01 PROCEDURE — 82140 ASSAY OF AMMONIA: CPT | Performed by: INTERNAL MEDICINE

## 2017-01-01 RX ORDER — MAGNESIUM SULFATE HEPTAHYDRATE 40 MG/ML
INJECTION, SOLUTION INTRAVENOUS
Status: COMPLETED
Start: 2017-01-01 | End: 2017-01-01

## 2017-01-01 RX ORDER — MAGNESIUM SULFATE 100 %
4 CRYSTALS MISCELLANEOUS AS NEEDED
Status: DISCONTINUED | OUTPATIENT
Start: 2017-01-01 | End: 2017-01-01

## 2017-01-01 RX ORDER — BACITRACIN 500 UNIT/G
1 PACKET (EA) TOPICAL AS NEEDED
Status: DISCONTINUED | OUTPATIENT
Start: 2017-01-01 | End: 2017-01-01

## 2017-01-01 RX ORDER — SODIUM CHLORIDE 0.9 % (FLUSH) 0.9 %
10-40 SYRINGE (ML) INJECTION EVERY 8 HOURS
Status: DISCONTINUED | OUTPATIENT
Start: 2017-01-01 | End: 2017-01-01 | Stop reason: HOSPADM

## 2017-01-01 RX ORDER — GUAIFENESIN 100 MG/5ML
81 LIQUID (ML) ORAL DAILY
Status: DISCONTINUED | OUTPATIENT
Start: 2017-01-01 | End: 2017-01-01

## 2017-01-01 RX ORDER — DEXTROSE MONOHYDRATE 50 MG/ML
125 INJECTION, SOLUTION INTRAVENOUS CONTINUOUS
Status: DISCONTINUED | OUTPATIENT
Start: 2017-01-01 | End: 2017-01-01

## 2017-01-01 RX ORDER — MAGNESIUM SULFATE 1 G/100ML
1 INJECTION INTRAVENOUS ONCE
Status: COMPLETED | OUTPATIENT
Start: 2017-01-01 | End: 2017-01-01

## 2017-01-01 RX ORDER — SODIUM CHLORIDE 0.9 % (FLUSH) 0.9 %
10 SYRINGE (ML) INJECTION AS NEEDED
Status: DISCONTINUED | OUTPATIENT
Start: 2017-01-01 | End: 2017-01-01 | Stop reason: HOSPADM

## 2017-01-01 RX ORDER — NEOMYCIN SULFATE, POLYMYXIN B SULFATE, BACITRACIN ZINC, HYDROCORTISONE 3.5; 10000; 400; 1 MG/G; [USP'U]/G; [USP'U]/G; MG/G
OINTMENT OPHTHALMIC 2 TIMES DAILY
Status: DISCONTINUED | OUTPATIENT
Start: 2017-01-01 | End: 2017-01-01 | Stop reason: HOSPADM

## 2017-01-01 RX ORDER — CHLORHEXIDINE GLUCONATE 1.2 MG/ML
10 RINSE ORAL EVERY 12 HOURS
Status: DISCONTINUED | OUTPATIENT
Start: 2017-01-01 | End: 2017-01-01

## 2017-01-01 RX ORDER — SODIUM CHLORIDE 0.9 % (FLUSH) 0.9 %
5-10 SYRINGE (ML) INJECTION AS NEEDED
Status: DISCONTINUED | OUTPATIENT
Start: 2017-01-01 | End: 2017-01-01

## 2017-01-01 RX ORDER — TAMSULOSIN HYDROCHLORIDE 0.4 MG/1
0.4 CAPSULE ORAL DAILY
Status: DISCONTINUED | OUTPATIENT
Start: 2017-01-01 | End: 2017-01-01

## 2017-01-01 RX ORDER — LORAZEPAM 2 MG/ML
1 INJECTION INTRAMUSCULAR
Status: DISCONTINUED | OUTPATIENT
Start: 2017-01-01 | End: 2017-01-01 | Stop reason: HOSPADM

## 2017-01-01 RX ORDER — IPRATROPIUM BROMIDE AND ALBUTEROL SULFATE 2.5; .5 MG/3ML; MG/3ML
3 SOLUTION RESPIRATORY (INHALATION)
Status: DISCONTINUED | OUTPATIENT
Start: 2017-01-01 | End: 2017-01-01 | Stop reason: HOSPADM

## 2017-01-01 RX ORDER — INSULIN LISPRO 100 [IU]/ML
INJECTION, SOLUTION INTRAVENOUS; SUBCUTANEOUS EVERY 6 HOURS
Status: DISCONTINUED | OUTPATIENT
Start: 2017-01-01 | End: 2017-01-01

## 2017-01-01 RX ORDER — SODIUM CHLORIDE 0.9 % (FLUSH) 0.9 %
5-10 SYRINGE (ML) INJECTION EVERY 8 HOURS
Status: DISCONTINUED | OUTPATIENT
Start: 2017-01-01 | End: 2017-01-01

## 2017-01-01 RX ORDER — POTASSIUM CHLORIDE AND SODIUM CHLORIDE 450; 150 MG/100ML; MG/100ML
INJECTION, SOLUTION INTRAVENOUS CONTINUOUS
Status: DISCONTINUED | OUTPATIENT
Start: 2017-01-01 | End: 2017-01-01

## 2017-01-01 RX ORDER — SODIUM CHLORIDE 0.9 % (FLUSH) 0.9 %
10 SYRINGE (ML) INJECTION EVERY 24 HOURS
Status: DISCONTINUED | OUTPATIENT
Start: 2017-01-01 | End: 2017-01-01

## 2017-01-01 RX ORDER — FACIAL-BODY WIPES
10 EACH TOPICAL
Qty: 28 EACH | Refills: 0 | Status: SHIPPED | OUTPATIENT
Start: 2017-01-01

## 2017-01-01 RX ORDER — MAGNESIUM SULFATE HEPTAHYDRATE 40 MG/ML
2 INJECTION, SOLUTION INTRAVENOUS ONCE
Status: COMPLETED | OUTPATIENT
Start: 2017-01-01 | End: 2017-01-01

## 2017-01-01 RX ORDER — SCOLOPAMINE TRANSDERMAL SYSTEM 1 MG/1
1.5 PATCH, EXTENDED RELEASE TRANSDERMAL
Status: DISCONTINUED | OUTPATIENT
Start: 2017-01-01 | End: 2017-01-01 | Stop reason: HOSPADM

## 2017-01-01 RX ORDER — SODIUM CHLORIDE 0.9 % (FLUSH) 0.9 %
10-30 SYRINGE (ML) INJECTION AS NEEDED
Status: DISCONTINUED | OUTPATIENT
Start: 2017-01-01 | End: 2017-01-01

## 2017-01-01 RX ORDER — SODIUM CHLORIDE 9 MG/ML
125 INJECTION, SOLUTION INTRAVENOUS CONTINUOUS
Status: DISCONTINUED | OUTPATIENT
Start: 2017-01-01 | End: 2017-01-01

## 2017-01-01 RX ORDER — DEXTROSE MONOHYDRATE AND SODIUM CHLORIDE 5; .45 G/100ML; G/100ML
75 INJECTION, SOLUTION INTRAVENOUS CONTINUOUS
Status: DISCONTINUED | OUTPATIENT
Start: 2017-01-01 | End: 2017-01-01

## 2017-01-01 RX ORDER — HALOPERIDOL 5 MG/ML
2 INJECTION INTRAMUSCULAR
Status: DISCONTINUED | OUTPATIENT
Start: 2017-01-01 | End: 2017-01-01 | Stop reason: HOSPADM

## 2017-01-01 RX ORDER — HEPARIN SODIUM 5000 [USP'U]/ML
5000 INJECTION, SOLUTION INTRAVENOUS; SUBCUTANEOUS EVERY 12 HOURS
Status: DISCONTINUED | OUTPATIENT
Start: 2017-01-01 | End: 2017-01-01

## 2017-01-01 RX ORDER — ATORVASTATIN CALCIUM 40 MG/1
80 TABLET, FILM COATED ORAL
Status: DISCONTINUED | OUTPATIENT
Start: 2017-01-01 | End: 2017-01-01

## 2017-01-01 RX ORDER — POTASSIUM CHLORIDE 7.45 MG/ML
10 INJECTION INTRAVENOUS
Status: COMPLETED | OUTPATIENT
Start: 2017-01-01 | End: 2017-01-01

## 2017-01-01 RX ORDER — SODIUM CHLORIDE 0.9 % (FLUSH) 0.9 %
20 SYRINGE (ML) INJECTION EVERY 24 HOURS
Status: DISCONTINUED | OUTPATIENT
Start: 2017-01-01 | End: 2017-01-01

## 2017-01-01 RX ORDER — GLYCOPYRROLATE 0.2 MG/ML
0.2 INJECTION INTRAMUSCULAR; INTRAVENOUS
Status: DISCONTINUED | OUTPATIENT
Start: 2017-01-01 | End: 2017-01-01 | Stop reason: HOSPADM

## 2017-01-01 RX ORDER — POTASSIUM CHLORIDE 1.5 G/1.77G
20 POWDER, FOR SOLUTION ORAL DAILY
Status: DISCONTINUED | OUTPATIENT
Start: 2017-01-01 | End: 2017-01-01

## 2017-01-01 RX ORDER — ALBUTEROL SULFATE 0.83 MG/ML
2.5 SOLUTION RESPIRATORY (INHALATION)
Status: DISCONTINUED | OUTPATIENT
Start: 2017-01-01 | End: 2017-01-01 | Stop reason: HOSPADM

## 2017-01-01 RX ORDER — LEVOFLOXACIN 5 MG/ML
750 INJECTION, SOLUTION INTRAVENOUS
Status: DISCONTINUED | OUTPATIENT
Start: 2017-01-01 | End: 2017-01-01

## 2017-01-01 RX ORDER — POTASSIUM CHLORIDE 1.5 G/1.77G
50 POWDER, FOR SOLUTION ORAL DAILY
Status: DISCONTINUED | OUTPATIENT
Start: 2017-01-01 | End: 2017-01-01

## 2017-01-01 RX ORDER — MORPHINE SULFATE 20 MG/ML
10 SOLUTION ORAL
Qty: 30 ML | Refills: 0 | Status: SHIPPED | OUTPATIENT
Start: 2017-01-01

## 2017-01-01 RX ORDER — POTASSIUM CHLORIDE 1.5 G/1.77G
50 POWDER, FOR SOLUTION ORAL DAILY
Status: COMPLETED | OUTPATIENT
Start: 2017-01-01 | End: 2017-01-01

## 2017-01-01 RX ORDER — FACIAL-BODY WIPES
10 EACH TOPICAL DAILY PRN
Status: DISCONTINUED | OUTPATIENT
Start: 2017-01-01 | End: 2017-01-01 | Stop reason: HOSPADM

## 2017-01-01 RX ORDER — SODIUM CHLORIDE 9 MG/ML
1000 INJECTION, SOLUTION INTRAVENOUS ONCE
Status: COMPLETED | OUTPATIENT
Start: 2017-01-01 | End: 2017-01-01

## 2017-01-01 RX ORDER — FLUOXETINE HYDROCHLORIDE 20 MG/1
40 CAPSULE ORAL DAILY
Status: DISCONTINUED | OUTPATIENT
Start: 2017-01-01 | End: 2017-01-01

## 2017-01-01 RX ORDER — POTASSIUM CHLORIDE 1.5 G/1.77G
20 POWDER, FOR SOLUTION ORAL ONCE
Status: COMPLETED | OUTPATIENT
Start: 2017-01-01 | End: 2017-01-01

## 2017-01-01 RX ORDER — ACETAMINOPHEN 650 MG/1
650 SUPPOSITORY RECTAL
Status: DISCONTINUED | OUTPATIENT
Start: 2017-01-01 | End: 2017-01-01

## 2017-01-01 RX ORDER — SCOLOPAMINE TRANSDERMAL SYSTEM 1 MG/1
1.5 PATCH, EXTENDED RELEASE TRANSDERMAL
Qty: 10 PATCH | Refills: 0 | Status: SHIPPED | OUTPATIENT
Start: 2017-01-01

## 2017-01-01 RX ORDER — ONDANSETRON 2 MG/ML
4 INJECTION INTRAMUSCULAR; INTRAVENOUS
Status: DISCONTINUED | OUTPATIENT
Start: 2017-01-01 | End: 2017-01-01 | Stop reason: HOSPADM

## 2017-01-01 RX ORDER — ATROPINE SULFATE 10 MG/ML
3 SOLUTION/ DROPS OPHTHALMIC
Status: DISCONTINUED | OUTPATIENT
Start: 2017-01-01 | End: 2017-01-01 | Stop reason: HOSPADM

## 2017-01-01 RX ORDER — POTASSIUM CHLORIDE 7.45 MG/ML
INJECTION INTRAVENOUS
Status: DISPENSED
Start: 2017-01-01 | End: 2017-01-01

## 2017-01-01 RX ORDER — LIDOCAINE HYDROCHLORIDE 10 MG/ML
1-5 INJECTION INFILTRATION; PERINEURAL
Status: COMPLETED | OUTPATIENT
Start: 2017-01-01 | End: 2017-01-01

## 2017-01-01 RX ORDER — NOREPINEPHRINE BITARTRATE/D5W 8 MG/250ML
2-16 PLASTIC BAG, INJECTION (ML) INTRAVENOUS
Status: DISCONTINUED | OUTPATIENT
Start: 2017-01-01 | End: 2017-01-01

## 2017-01-01 RX ORDER — MORPHINE SULFATE 2 MG/ML
2 INJECTION, SOLUTION INTRAMUSCULAR; INTRAVENOUS
Status: DISCONTINUED | OUTPATIENT
Start: 2017-01-01 | End: 2017-01-01 | Stop reason: HOSPADM

## 2017-01-01 RX ORDER — DEXTROSE 50 % IN WATER (D50W) INTRAVENOUS SYRINGE
25-50 AS NEEDED
Status: DISCONTINUED | OUTPATIENT
Start: 2017-01-01 | End: 2017-01-01

## 2017-01-01 RX ORDER — ATROPINE SULFATE 10 MG/ML
3 SOLUTION/ DROPS OPHTHALMIC
Qty: 5 ML | Refills: 0 | Status: SHIPPED | OUTPATIENT
Start: 2017-01-01

## 2017-01-01 RX ORDER — HYDRALAZINE HYDROCHLORIDE 20 MG/ML
10 INJECTION INTRAMUSCULAR; INTRAVENOUS
Status: DISCONTINUED | OUTPATIENT
Start: 2017-01-01 | End: 2017-01-01

## 2017-01-01 RX ADMIN — Medication 10 ML: at 14:43

## 2017-01-01 RX ADMIN — DEXTROSE MONOHYDRATE: 5 INJECTION, SOLUTION INTRAVENOUS at 14:40

## 2017-01-01 RX ADMIN — SODIUM CHLORIDE 0.6 MCG/KG/HR: 900 INJECTION, SOLUTION INTRAVENOUS at 23:43

## 2017-01-01 RX ADMIN — CHLORHEXIDINE GLUCONATE 10 ML: 1.2 RINSE ORAL at 20:23

## 2017-01-01 RX ADMIN — PIPERACILLIN SODIUM,TAZOBACTAM SODIUM 4.5 G: 4; .5 INJECTION, POWDER, FOR SOLUTION INTRAVENOUS at 15:40

## 2017-01-01 RX ADMIN — Medication 10 ML: at 14:42

## 2017-01-01 RX ADMIN — POTASSIUM CHLORIDE 20 MEQ: 1.5 POWDER, FOR SOLUTION ORAL at 08:26

## 2017-01-01 RX ADMIN — NEOMYCIN SULFATE AND POLYMYXIN B SULFATE, BACITRACIN ZINC AND HYDROCORTISONE: 3.5; 10000; 400; 1 OINTMENT OPHTHALMIC at 18:38

## 2017-01-01 RX ADMIN — LIDOCAINE HYDROCHLORIDE 3 ML: 10 INJECTION, SOLUTION INFILTRATION; PERINEURAL at 13:30

## 2017-01-01 RX ADMIN — POTASSIUM CHLORIDE 10 MEQ: 10 INJECTION, SOLUTION INTRAVENOUS at 08:08

## 2017-01-01 RX ADMIN — POTASSIUM CHLORIDE 50 MEQ: 20 POWDER ORAL at 00:14

## 2017-01-01 RX ADMIN — PIPERACILLIN SODIUM,TAZOBACTAM SODIUM 3.38 G: 3; .375 INJECTION, POWDER, FOR SOLUTION INTRAVENOUS at 06:36

## 2017-01-01 RX ADMIN — ACETAMINOPHEN 650 MG: 650 SUPPOSITORY RECTAL at 00:15

## 2017-01-01 RX ADMIN — PIPERACILLIN SODIUM,TAZOBACTAM SODIUM 3.38 G: 3; .375 INJECTION, POWDER, FOR SOLUTION INTRAVENOUS at 22:21

## 2017-01-01 RX ADMIN — CHLORHEXIDINE GLUCONATE 10 ML: 1.2 RINSE ORAL at 10:14

## 2017-01-01 RX ADMIN — POTASSIUM CHLORIDE 10 MEQ: 10 INJECTION, SOLUTION INTRAVENOUS at 06:29

## 2017-01-01 RX ADMIN — POTASSIUM CHLORIDE 20 MEQ: 1.5 POWDER, FOR SOLUTION ORAL at 08:22

## 2017-01-01 RX ADMIN — NEOMYCIN SULFATE AND POLYMYXIN B SULFATE, BACITRACIN ZINC AND HYDROCORTISONE: 3.5; 10000; 400; 1 OINTMENT OPHTHALMIC at 08:48

## 2017-01-01 RX ADMIN — LEVOFLOXACIN 750 MG: 5 INJECTION, SOLUTION INTRAVENOUS at 16:10

## 2017-01-01 RX ADMIN — Medication 40 ML: at 17:19

## 2017-01-01 RX ADMIN — SODIUM CHLORIDE 500 MG: 900 INJECTION, SOLUTION INTRAVENOUS at 22:17

## 2017-01-01 RX ADMIN — HEPARIN SODIUM 5000 UNITS: 5000 INJECTION, SOLUTION INTRAVENOUS; SUBCUTANEOUS at 12:02

## 2017-01-01 RX ADMIN — Medication 10 ML: at 15:11

## 2017-01-01 RX ADMIN — SODIUM CHLORIDE 1000 ML: 900 INJECTION, SOLUTION INTRAVENOUS at 15:57

## 2017-01-01 RX ADMIN — NEOMYCIN SULFATE AND POLYMYXIN B SULFATE, BACITRACIN ZINC AND HYDROCORTISONE: 3.5; 10000; 400; 1 OINTMENT OPHTHALMIC at 18:00

## 2017-01-01 RX ADMIN — PIPERACILLIN SODIUM,TAZOBACTAM SODIUM 3.38 G: 3; .375 INJECTION, POWDER, FOR SOLUTION INTRAVENOUS at 05:28

## 2017-01-01 RX ADMIN — SODIUM CHLORIDE 9 MMOL: 900 INJECTION, SOLUTION INTRAVENOUS at 11:23

## 2017-01-01 RX ADMIN — HEPARIN SODIUM 5000 UNITS: 5000 INJECTION, SOLUTION INTRAVENOUS; SUBCUTANEOUS at 10:34

## 2017-01-01 RX ADMIN — SODIUM CHLORIDE 40 MG: 9 INJECTION INTRAMUSCULAR; INTRAVENOUS; SUBCUTANEOUS at 10:33

## 2017-01-01 RX ADMIN — HEPARIN SODIUM 5000 UNITS: 5000 INJECTION, SOLUTION INTRAVENOUS; SUBCUTANEOUS at 12:50

## 2017-01-01 RX ADMIN — NEOMYCIN SULFATE AND POLYMYXIN B SULFATE, BACITRACIN ZINC AND HYDROCORTISONE: 3.5; 10000; 400; 1 OINTMENT OPHTHALMIC at 15:14

## 2017-01-01 RX ADMIN — Medication 20 ML: at 13:11

## 2017-01-01 RX ADMIN — Medication 10 ML: at 21:11

## 2017-01-01 RX ADMIN — Medication 10 ML: at 06:12

## 2017-01-01 RX ADMIN — Medication 10 ML: at 21:27

## 2017-01-01 RX ADMIN — SODIUM CHLORIDE 125 ML/HR: 900 INJECTION, SOLUTION INTRAVENOUS at 18:04

## 2017-01-01 RX ADMIN — SODIUM CHLORIDE 40 MG: 9 INJECTION INTRAMUSCULAR; INTRAVENOUS; SUBCUTANEOUS at 08:27

## 2017-01-01 RX ADMIN — INSULIN LISPRO 2 UNITS: 100 INJECTION, SOLUTION INTRAVENOUS; SUBCUTANEOUS at 00:50

## 2017-01-01 RX ADMIN — CHLORHEXIDINE GLUCONATE 10 ML: 1.2 RINSE ORAL at 22:12

## 2017-01-01 RX ADMIN — DEXTROSE MONOHYDRATE: 5 INJECTION, SOLUTION INTRAVENOUS at 09:12

## 2017-01-01 RX ADMIN — CHLORHEXIDINE GLUCONATE 10 ML: 1.2 RINSE ORAL at 08:26

## 2017-01-01 RX ADMIN — LEVOFLOXACIN 750 MG: 5 INJECTION, SOLUTION INTRAVENOUS at 16:45

## 2017-01-01 RX ADMIN — SODIUM CHLORIDE 1000 MG: 900 INJECTION, SOLUTION INTRAVENOUS at 15:50

## 2017-01-01 RX ADMIN — SODIUM CHLORIDE 40 MG: 9 INJECTION INTRAMUSCULAR; INTRAVENOUS; SUBCUTANEOUS at 08:09

## 2017-01-01 RX ADMIN — CHLORHEXIDINE GLUCONATE 10 ML: 1.2 RINSE ORAL at 21:57

## 2017-01-01 RX ADMIN — INSULIN LISPRO 2 UNITS: 100 INJECTION, SOLUTION INTRAVENOUS; SUBCUTANEOUS at 06:34

## 2017-01-01 RX ADMIN — MAGNESIUM SULFATE HEPTAHYDRATE 1 G: 1 INJECTION, SOLUTION INTRAVENOUS at 08:27

## 2017-01-01 RX ADMIN — POTASSIUM CHLORIDE 20 MEQ: 1.5 POWDER, FOR SOLUTION ORAL at 10:13

## 2017-01-01 RX ADMIN — POTASSIUM CHLORIDE 10 MEQ: 10 INJECTION, SOLUTION INTRAVENOUS at 09:12

## 2017-01-01 RX ADMIN — PIPERACILLIN SODIUM,TAZOBACTAM SODIUM 3.38 G: 3; .375 INJECTION, POWDER, FOR SOLUTION INTRAVENOUS at 15:46

## 2017-01-01 RX ADMIN — SODIUM CHLORIDE 12 MMOL: 900 INJECTION, SOLUTION INTRAVENOUS at 10:23

## 2017-01-01 RX ADMIN — HEPARIN SODIUM 5000 UNITS: 5000 INJECTION, SOLUTION INTRAVENOUS; SUBCUTANEOUS at 10:13

## 2017-01-01 RX ADMIN — SODIUM CHLORIDE 9 MMOL: 900 INJECTION, SOLUTION INTRAVENOUS at 08:40

## 2017-01-01 RX ADMIN — MAGNESIUM SULFATE HEPTAHYDRATE 2 G: 40 INJECTION, SOLUTION INTRAVENOUS at 15:25

## 2017-01-01 RX ADMIN — HEPARIN SODIUM 5000 UNITS: 5000 INJECTION, SOLUTION INTRAVENOUS; SUBCUTANEOUS at 22:43

## 2017-01-01 RX ADMIN — Medication 10 ML: at 05:13

## 2017-01-01 RX ADMIN — CHLORHEXIDINE GLUCONATE 10 ML: 1.2 RINSE ORAL at 08:09

## 2017-01-01 RX ADMIN — Medication 10 ML: at 05:32

## 2017-01-01 RX ADMIN — Medication 10 ML: at 13:32

## 2017-01-01 RX ADMIN — DEXTROSE MONOHYDRATE AND SODIUM CHLORIDE 75 ML/HR: 5; .45 INJECTION, SOLUTION INTRAVENOUS at 06:41

## 2017-01-01 RX ADMIN — Medication 10 ML: at 05:34

## 2017-01-01 RX ADMIN — Medication 10 ML: at 23:58

## 2017-01-01 RX ADMIN — DEXTROSE MONOHYDRATE: 5 INJECTION, SOLUTION INTRAVENOUS at 23:35

## 2017-01-01 RX ADMIN — POTASSIUM CHLORIDE 10 MEQ: 10 INJECTION, SOLUTION INTRAVENOUS at 12:51

## 2017-01-01 RX ADMIN — NEOMYCIN SULFATE AND POLYMYXIN B SULFATE, BACITRACIN ZINC AND HYDROCORTISONE: 3.5; 10000; 400; 1 OINTMENT OPHTHALMIC at 18:33

## 2017-01-01 RX ADMIN — CHLORHEXIDINE GLUCONATE 10 ML: 1.2 RINSE ORAL at 08:21

## 2017-01-01 RX ADMIN — Medication 10 ML: at 22:44

## 2017-01-01 RX ADMIN — DEXTROSE MONOHYDRATE 100 ML/HR: 5 INJECTION, SOLUTION INTRAVENOUS at 09:00

## 2017-01-01 RX ADMIN — SODIUM CHLORIDE 40 MG: 9 INJECTION INTRAMUSCULAR; INTRAVENOUS; SUBCUTANEOUS at 10:13

## 2017-01-01 RX ADMIN — PIPERACILLIN SODIUM,TAZOBACTAM SODIUM 3.38 G: 3; .375 INJECTION, POWDER, FOR SOLUTION INTRAVENOUS at 15:35

## 2017-01-01 RX ADMIN — NEOMYCIN SULFATE AND POLYMYXIN B SULFATE, BACITRACIN ZINC AND HYDROCORTISONE: 3.5; 10000; 400; 1 OINTMENT OPHTHALMIC at 13:28

## 2017-01-01 RX ADMIN — POTASSIUM PHOSPHATE, MONOBASIC AND POTASSIUM PHOSPHATE, DIBASIC: 224; 236 INJECTION, SOLUTION INTRAVENOUS at 12:19

## 2017-01-01 RX ADMIN — SODIUM CHLORIDE 750 MG: 900 INJECTION, SOLUTION INTRAVENOUS at 15:11

## 2017-01-01 RX ADMIN — PIPERACILLIN SODIUM,TAZOBACTAM SODIUM 3.38 G: 3; .375 INJECTION, POWDER, FOR SOLUTION INTRAVENOUS at 21:14

## 2017-01-01 RX ADMIN — CHLORHEXIDINE GLUCONATE 10 ML: 1.2 RINSE ORAL at 10:33

## 2017-01-01 RX ADMIN — SODIUM CHLORIDE 40 MG: 9 INJECTION INTRAMUSCULAR; INTRAVENOUS; SUBCUTANEOUS at 08:22

## 2017-01-01 RX ADMIN — SODIUM CHLORIDE 1000 ML: 900 INJECTION, SOLUTION INTRAVENOUS at 15:20

## 2017-01-01 RX ADMIN — INSULIN LISPRO 2 UNITS: 100 INJECTION, SOLUTION INTRAVENOUS; SUBCUTANEOUS at 18:33

## 2017-01-01 RX ADMIN — HEPARIN SODIUM 5000 UNITS: 5000 INJECTION, SOLUTION INTRAVENOUS; SUBCUTANEOUS at 22:55

## 2017-01-01 RX ADMIN — Medication 10 ML: at 15:35

## 2017-01-01 RX ADMIN — SODIUM CHLORIDE 0.2 MCG/KG/HR: 900 INJECTION, SOLUTION INTRAVENOUS at 23:12

## 2017-01-01 RX ADMIN — INSULIN LISPRO 2 UNITS: 100 INJECTION, SOLUTION INTRAVENOUS; SUBCUTANEOUS at 00:26

## 2017-01-01 RX ADMIN — PIPERACILLIN SODIUM,TAZOBACTAM SODIUM 3.38 G: 3; .375 INJECTION, POWDER, FOR SOLUTION INTRAVENOUS at 03:27

## 2017-01-01 RX ADMIN — CHLORHEXIDINE GLUCONATE 10 ML: 1.2 RINSE ORAL at 20:04

## 2017-01-01 RX ADMIN — PIPERACILLIN SODIUM,TAZOBACTAM SODIUM 3.38 G: 3; .375 INJECTION, POWDER, FOR SOLUTION INTRAVENOUS at 10:33

## 2017-01-01 RX ADMIN — NEOMYCIN SULFATE AND POLYMYXIN B SULFATE, BACITRACIN ZINC AND HYDROCORTISONE: 3.5; 10000; 400; 1 OINTMENT OPHTHALMIC at 08:25

## 2017-01-01 RX ADMIN — HEPARIN SODIUM 5000 UNITS: 5000 INJECTION, SOLUTION INTRAVENOUS; SUBCUTANEOUS at 22:13

## 2017-01-01 RX ADMIN — PIPERACILLIN SODIUM,TAZOBACTAM SODIUM 3.38 G: 3; .375 INJECTION, POWDER, FOR SOLUTION INTRAVENOUS at 05:13

## 2017-01-01 RX ADMIN — PIPERACILLIN SODIUM,TAZOBACTAM SODIUM 3.38 G: 3; .375 INJECTION, POWDER, FOR SOLUTION INTRAVENOUS at 21:57

## 2017-01-01 RX ADMIN — HEPARIN SODIUM 5000 UNITS: 5000 INJECTION, SOLUTION INTRAVENOUS; SUBCUTANEOUS at 23:20

## 2017-01-01 RX ADMIN — PIPERACILLIN SODIUM,TAZOBACTAM SODIUM 3.38 G: 3; .375 INJECTION, POWDER, FOR SOLUTION INTRAVENOUS at 07:11

## 2017-01-01 RX ADMIN — INSULIN LISPRO 2 UNITS: 100 INJECTION, SOLUTION INTRAVENOUS; SUBCUTANEOUS at 00:02

## 2017-01-01 RX ADMIN — INSULIN LISPRO 2 UNITS: 100 INJECTION, SOLUTION INTRAVENOUS; SUBCUTANEOUS at 05:46

## 2017-01-01 RX ADMIN — PIPERACILLIN SODIUM,TAZOBACTAM SODIUM 3.38 G: 3; .375 INJECTION, POWDER, FOR SOLUTION INTRAVENOUS at 13:19

## 2017-01-01 RX ADMIN — NEOMYCIN SULFATE AND POLYMYXIN B SULFATE, BACITRACIN ZINC AND HYDROCORTISONE: 3.5; 10000; 400; 1 OINTMENT OPHTHALMIC at 09:00

## 2017-01-01 RX ADMIN — INSULIN LISPRO 2 UNITS: 100 INJECTION, SOLUTION INTRAVENOUS; SUBCUTANEOUS at 13:09

## 2017-01-01 RX ADMIN — DEXTROSE MONOHYDRATE: 5 INJECTION, SOLUTION INTRAVENOUS at 12:21

## 2017-01-01 RX ADMIN — SODIUM CHLORIDE 1836 ML: 900 INJECTION, SOLUTION INTRAVENOUS at 15:00

## 2017-01-01 RX ADMIN — SODIUM CHLORIDE AND POTASSIUM CHLORIDE: 4.5; 1.49 INJECTION, SOLUTION INTRAVENOUS at 08:21

## 2017-01-01 RX ADMIN — SODIUM CHLORIDE 750 MG: 900 INJECTION, SOLUTION INTRAVENOUS at 13:19

## 2017-01-01 RX ADMIN — Medication 10 ML: at 21:57

## 2017-01-01 RX ADMIN — Medication 10 ML: at 22:13

## 2017-01-01 RX ADMIN — SODIUM CHLORIDE 1000 ML: 900 INJECTION, SOLUTION INTRAVENOUS at 04:49

## 2017-01-01 RX ADMIN — INSULIN LISPRO 4 UNITS: 100 INJECTION, SOLUTION INTRAVENOUS; SUBCUTANEOUS at 18:37

## 2017-01-01 RX ADMIN — INSULIN LISPRO 4 UNITS: 100 INJECTION, SOLUTION INTRAVENOUS; SUBCUTANEOUS at 12:28

## 2017-01-01 RX ADMIN — SODIUM CHLORIDE AND POTASSIUM CHLORIDE: 4.5; 1.49 INJECTION, SOLUTION INTRAVENOUS at 12:07

## 2017-01-01 RX ADMIN — CHLORHEXIDINE GLUCONATE 10 ML: 1.2 RINSE ORAL at 22:38

## 2017-01-01 RX ADMIN — NEOMYCIN SULFATE AND POLYMYXIN B SULFATE, BACITRACIN ZINC AND HYDROCORTISONE: 3.5; 10000; 400; 1 OINTMENT OPHTHALMIC at 17:43

## 2017-01-01 RX ADMIN — PIPERACILLIN SODIUM,TAZOBACTAM SODIUM 3.38 G: 3; .375 INJECTION, POWDER, FOR SOLUTION INTRAVENOUS at 16:00

## 2017-01-01 RX ADMIN — PIPERACILLIN SODIUM,TAZOBACTAM SODIUM 3.38 G: 3; .375 INJECTION, POWDER, FOR SOLUTION INTRAVENOUS at 22:43

## 2017-01-01 RX ADMIN — Medication 2 MCG/MIN: at 06:55

## 2017-01-01 RX ADMIN — Medication 10 ML: at 05:28

## 2017-01-01 RX ADMIN — SODIUM CHLORIDE 0.33 MCG/KG/HR: 900 INJECTION, SOLUTION INTRAVENOUS at 16:10

## 2017-01-01 RX ADMIN — NEOMYCIN SULFATE AND POLYMYXIN B SULFATE, BACITRACIN ZINC AND HYDROCORTISONE: 3.5; 10000; 400; 1 OINTMENT OPHTHALMIC at 10:00

## 2017-01-01 RX ADMIN — PIPERACILLIN SODIUM,TAZOBACTAM SODIUM 3.38 G: 3; .375 INJECTION, POWDER, FOR SOLUTION INTRAVENOUS at 21:11

## 2017-03-06 PROBLEM — A41.9 SEPSIS (HCC): Status: ACTIVE | Noted: 2017-01-01

## 2017-03-06 PROBLEM — J96.90 RESPIRATORY FAILURE (HCC): Status: ACTIVE | Noted: 2017-01-01

## 2017-03-06 NOTE — PROGRESS NOTES
Pt arrived to ED via EMS, s/p possible cardiac arrest at nursing facility. Pt arrived intubated with 7.0 ETT secured at 22 cm at the lip, and being ventilated via Ambu-Bag. Placed on mechanical ventilator AC/VC+ 10/400/5/50%. Airway confirmed with + ETCO2, + Bilateral breath sounds, and + visualization by Dr. Markos Teixeira. Airway then secured with Holister device. ABG and CXR to be obtained. Pt having periods of hypotension and irregular heart rate. Will continue to monitor closely.   KFL

## 2017-03-06 NOTE — ED PROVIDER NOTES
HPI Comments: 3:02 PM Loyda Gil is a 80 y.o. male with h/o HTN, DM, TIA, and stroke who presents to ED via EMS from [facility] unresponsive. EMS report [facility] staff found patient in asystole, then performed CPR for 1 minute prior to EMS arrival. EMS gave patient Narcan 2 mg, Versed 5 mg, Etomidate 30 mg, and Succinylcholine 100 mg. EMS state patient did not respond to Narcan. EMS intubated the patient using a 7.0 tube, 21 at the teeth. Bilateral breath sounds heard after intubation. PCP: Ryan Reich MD    The history is provided by the EMS personnel. Past Medical History:   Diagnosis Date    Chronic back pain     s/p Leminectomy and discectomy (2011)    Depression     Diabetes mellitus     Herniated disc     status post laminectomy and diskectomy surgery in January    Hyperlipidemia     Hypertension     Hypertensive heart disease     Kidney stones     Nephrocalcinosis     Other ill-defined conditions(799.89)     AFIB    PVC (premature ventricular contraction)     Restless leg syndrome     Stroke ([facility] [address] 6/7/12    TIA    TIA (transient ischemic attack)        Past Surgical History:   Procedure Laterality Date    HX LUMBAR DISKECTOMY  january 2011   [place] Pee      LITHOTRIPSY           Family History:   Problem Relation Age of Onset    Heart Failure Mother     Stroke Mother     Heart Failure Father        Social History     Social History    Marital status:      Spouse name: N/A    Number of children: N/A    Years of education: N/A     Occupational History    Not on file.      Social History Main Topics    Smoking status: Never Smoker    Smokeless tobacco: Not on file    Alcohol use No    Drug use: Not on file    Sexual activity: Not on file     Other Topics Concern    Not on file     Social History Narrative    ** Merged History Encounter **              ALLERGIES: Sulfa (sulfonamide antibiotics) and Sulfa (sulfonamide antibiotics)    Review of Systems   Unable to perform ROS: Patient unresponsive       Vitals:    03/06/17 1710 03/06/17 1715 03/06/17 1720 03/06/17 1725   BP: (!) 114/97 105/77 100/75 115/75   Pulse: (!) 117 (!) 124 (!) 118 (!) 114   Resp: 17 15 18 17   Temp: 95.9 °F (35.5 °C) 95.9 °F (35.5 °C) 99.7 °F (37.6 °C) 95.9 °F (35.5 °C)   SpO2: 97% 97% 98% 97%   Weight:       Height:                Physical Exam   Constitutional: He is chemically paralyzed and intubated. Cervical collar and face mask in place. HENT:   Head: Normocephalic and atraumatic. Eyes:   Pupils 2 mm, fixed. Neck: No tracheal deviation present. Cardiovascular: An irregularly irregular rhythm present. Tachycardia present. Pulmonary/Chest: He is intubated. Intubated   Abdominal: He exhibits no distension. Musculoskeletal:        Right lower leg: He exhibits no swelling. Left lower leg: He exhibits no swelling. No pretibial swelling. Neurological: He is unresponsive. Skin: No rash noted. He is not diaphoretic. No rashes. Skin warm to touch. Nursing note and vitals reviewed.        MDM  Number of Diagnoses or Management Options  Acute cystitis with hematuria:   Sepsis, due to unspecified organism [facility]):   Unresponsive:   Critical Care  Total time providing critical care: 30-74 minutes    ED Course       CRITICAL CARE (ASAP ONLY)  Performed by: Jorge Winkler  Authorized by: Jorge Winkler     Critical care provider statement:     Critical care time (minutes):  60    Critical care was necessary to treat or prevent imminent or life-threatening deterioration of the following conditions:  Sepsis    Critical care was time spent personally by me on the following activities:  Blood draw for specimens, ordering and performing treatments and interventions, ordering and review of laboratory studies, ordering and review of radiographic studies, development of treatment plan with patient or surrogate, gastric intubation, examination of patient, evaluation of patient's response to treatment, pulse oximetry, re-evaluation of patient's condition, review of old charts, transcutaneous pacing, ventilator management, obtaining history from patient or surrogate and discussions with consultants        Vitals:  Patient Vitals for the past 12 hrs:   Temp Pulse Resp BP SpO2   03/06/17 1725 95.9 °F (35.5 °C) (!) 114 17 115/75 97 %   03/06/17 1720 99.7 °F (37.6 °C) (!) 118 18 100/75 98 %   03/06/17 1715 95.9 °F (35.5 °C) (!) 124 15 105/77 97 %   03/06/17 1710 95.9 °F (35.5 °C) (!) 117 17 (!) 114/97 97 %   03/06/17 1700 - (!) 122 24 110/65 98 %   03/06/17 1647 96.1 °F (35.6 °C) (!) 125 20 - 100 %   03/06/17 1645 - - - 115/69 -   03/06/17 1617 - (!) 127 - 114/89 94 %   03/06/17 1610 96.8 °F (36 °C) (!) 136 - 102/42 -   03/06/17 1605 - (!) 134 - 129/88 97 %   03/06/17 1600 - (!) 137 - (!) 149/95 99 %   03/06/17 1555 - (!) 137 19 - 99 %   03/06/17 1553 - (!) 134 - 146/78 93 %   03/06/17 1540 98.4 °F (36.9 °C) (!) 132 - (!) 181/155 -   03/06/17 1539 - (!) 133 - - -   03/06/17 1538 99.9 °F (37.7 °C) (!) 129 - - -   03/06/17 1537 97.3 °F (36.3 °C) (!) 132 - (!) 188/168 -   03/06/17 1536 97.2 °F (36.2 °C) (!) 138 - (!) 184/141 -   03/06/17 1528 95.9 °F (35.5 °C) (!) 152 - 125/90 -   03/06/17 1515 - (!) 166 22 (!) 61/41 -   03/06/17 1512 - (!) 150 20 117/57 -        Medications ordered:   Medications   sodium chloride (NS) flush 5-10 mL (not administered)   levoFLOXacin (LEVAQUIN) 750 mg in D5W IVPB (0 mg IntraVENous IV Completed 3/6/17 1740)   dexmedetomidine (PRECEDEX) 400 mcg in 0.9% sodium chloride 100 mL infusion (0.654 mcg/kg/hr × 61.2 kg IntraVENous Rate Change 3/6/17 1635)   piperacillin-tazobactam (ZOSYN) 3.375 g in 0.9% sodium chloride (MBP/ADV) 100 mL MBP (not administered)   please update pt height into [employer].  thank you  ( Other Canceled Entry 3/6/17 1609)   pantoprazole (PROTONIX) 40 mg in sodium chloride 0.9 % 10 mL injection (not administered) 0.9% sodium chloride infusion (125 mL/hr IntraVENous New Bag 3/6/17 1804)   sodium chloride 0.9 % bolus infusion 1,836 mL (0 mL IntraVENous IV Completed 3/6/17 1540)   vancomycin (VANCOCIN) 1,000 mg in 0.9% sodium chloride (MBP/ADV) 250 mL adv (0 mg IntraVENous IV Completed 3/6/17 1750)   magnesium sulfate 2 g/50 ml IVPB (premix or compounded) (0 g IntraVENous IV Completed 3/6/17 1545)   0.9% sodium chloride infusion 1,000 mL (0 mL IntraVENous IV Completed 3/6/17 1545)   sodium chloride 0.9 % bolus infusion 1,000 mL (0 mL IntraVENous IV Completed 3/6/17 1635)   piperacillin-tazobactam (ZOSYN) 4.5 g in 0.9% sodium chloride (MBP/ADV) 100 mL MBP (0 g IntraVENous IV Completed 3/6/17 1613)         Lab findings:  Recent Results (from the past 12 hour(s))   POC TROPONIN-I    Collection Time: 03/06/17  3:16 PM   Result Value Ref Range    Troponin-I (POC) 0.04 0.00 - 0.08 ng/mL   POC CHEM8    Collection Time: 03/06/17  3:17 PM   Result Value Ref Range    CO2 (POC) 19 19 - 24 MMOL/L    Glucose (POC) 161 (H) 74 - 106 MG/DL    BUN (POC) 84 (H) 7 - 18 MG/DL    Creatinine (POC) 3.0 (H) 0.6 - 1.3 MG/DL    GFR-AA (POC) 25 (L) >60 ml/min/1.73m2    GFR, non-AA (POC) 20 (L) >60 ml/min/1.73m2    Sodium (POC) 158 (H) 136 - 145 MMOL/L    Potassium (POC) 4.3 3.5 - 5.5 MMOL/L    Calcium, ionized (POC) 1.15 1.12 - 1.32 MMOL/L    Chloride (POC) 126 (H) 100 - 108 MMOL/L    Anion gap (POC) 18 10 - 20      Hematocrit (POC) 42 36 - 49 %    Hemoglobin (POC) 14.3 12 - 16 G/DL   POC LACTIC ACID    Collection Time: 03/06/17  3:18 PM   Result Value Ref Range    Lactic Acid (POC) 4.0 (HH) 0.4 - 2.0 mmol/L   EKG, 12 LEAD, INITIAL    Collection Time: 03/06/17  3:19 PM   Result Value Ref Range    Ventricular Rate 162 BPM    Atrial Rate 166 BPM    P-R Interval 130 ms    QRS Duration 106 ms    Q-T Interval 250 ms    QTC Calculation (Bezet) 410 ms    Calculated P Axis 58 degrees    Calculated R Axis -38 degrees    Calculated T Axis -160 degrees Diagnosis       Sinus tachycardia  Left axis deviation  Marked ST abnormality, possible inferior subendocardial injury  Abnormal ECG  When compared with ECG of 24-DEC-2016 16:10,  Significant changes have occurred     METABOLIC PANEL, COMPREHENSIVE    Collection Time: 03/06/17  3:20 PM   Result Value Ref Range    Sodium 156 (H) 136 - 145 mmol/L    Potassium 4.4 3.5 - 5.5 mmol/L    Chloride 125 (H) 100 - 108 mmol/L    CO2 17 (L) 21 - 32 mmol/L    Anion gap 14 3.0 - 18 mmol/L    Glucose 157 (H) 74 - 99 mg/dL    BUN 87 (H) 7.0 - 18 MG/DL    Creatinine 3.24 (H) 0.6 - 1.3 MG/DL    BUN/Creatinine ratio 27 (H) 12 - 20      GFR est AA 23 (L) >60 ml/min/1.73m2    GFR est non-AA 19 (L) >60 ml/min/1.73m2    Calcium 9.8 8.5 - 10.1 MG/DL    Bilirubin, total 0.8 0.2 - 1.0 MG/DL    ALT (SGPT) 51 16 - 61 U/L    AST (SGOT) 43 (H) 15 - 37 U/L    Alk. phosphatase 103 45 - 117 U/L    Protein, total 7.0 6.4 - 8.2 g/dL    Albumin 3.1 (L) 3.4 - 5.0 g/dL    Globulin 3.9 2.0 - 4.0 g/dL    A-G Ratio 0.8 0.8 - 1.7     CBC WITH AUTOMATED DIFF    Collection Time: 03/06/17  3:20 PM   Result Value Ref Range    WBC 10.0 4.6 - 13.2 K/uL    RBC 4.26 (L) 4.70 - 5.50 M/uL    HGB 14.0 13.0 - 16.0 g/dL    HCT 43.3 36.0 - 48.0 %    .6 (H) 74.0 - 97.0 FL    MCH 32.9 24.0 - 34.0 PG    MCHC 32.3 31.0 - 37.0 g/dL    RDW 13.7 11.6 - 14.5 %    PLATELET 357 900 - 947 K/uL    MPV 11.4 9.2 - 11.8 FL    NEUTROPHILS 80 (H) 40 - 73 %    LYMPHOCYTES 14 (L) 21 - 52 %    MONOCYTES 6 3 - 10 %    EOSINOPHILS 0 0 - 5 %    BASOPHILS 0 0 - 2 %    ABS. NEUTROPHILS 8.0 1.8 - 8.0 K/UL    ABS. LYMPHOCYTES 1.4 0.9 - 3.6 K/UL    ABS. MONOCYTES 0.6 0.05 - 1.2 K/UL    ABS. EOSINOPHILS 0.0 0.0 - 0.4 K/UL    ABS.  BASOPHILS 0.0 0.0 - 0.06 K/UL    DF AUTOMATED     TYPE & SCREEN    Collection Time: 03/06/17  3:22 PM   Result Value Ref Range    Crossmatch Expiration 03/09/2017     ABO/Rh(D) B POSITIVE     Antibody screen NEG    URINALYSIS W/ RFLX MICROSCOPIC    Collection Time: 03/06/17  3:38 PM   Result Value Ref Range    Color DARK YELLOW      Appearance TURBID      Specific gravity 1.020 1.005 - 1.030      pH (UA) >8.5 (H) 5.0 - 8.0    Protein 300 (A) NEG mg/dL    Glucose NEGATIVE  NEG mg/dL    Ketone TRACE (A) NEG mg/dL    Bilirubin NEGATIVE  NEG      Blood LARGE (A) NEG      Urobilinogen 1.0 0.2 - 1.0 EU/dL    Nitrites NEGATIVE  NEG      Leukocyte Esterase LARGE (A) NEG     URINE MICROSCOPIC ONLY    Collection Time: 03/06/17  3:38 PM   Result Value Ref Range    WBC TOO NUMEROUS TO COUNT 0 - 4 /hpf    RBC TOO NUMEROUS TO COUNT 0 - 5 /hpf    Epithelial cells FEW 0 - 5 /lpf    Bacteria 4+ (A) NEG /hpf   POC G3    Collection Time: 03/06/17  4:53 PM   Result Value Ref Range    Device: VENT      FIO2 (POC) 60 %    pH (POC) 7.259 (L) 7.35 - 7.45      pCO2 (POC) 36.1 35.0 - 45.0 MMHG    pO2 (POC) 345 (H) 80 - 100 MMHG    HCO3 (POC) 16.4 (L) 22 - 26 MMOL/L    sO2 (POC) 100 (H) 92 - 97 %    Base deficit (POC) 11 mmol/L    Mode ASSIST CONTROL      Tidal volume 400 ml    Set Rate 10 bpm    PEEP/CPAP (POC) 5 cmH2O    Allens test (POC) YES      Total resp. rate 18      Site RIGHT RADIAL      Patient temp. 96.8      Specimen type (POC) ARTERIAL      Performed by Maira Hightower     Volume control plus YES     EKG, 12 LEAD, SUBSEQUENT    Collection Time: 03/06/17  5:55 PM   Result Value Ref Range    Ventricular Rate 65 BPM    Atrial Rate 65 BPM    P-R Interval 170 ms    QRS Duration 110 ms    Q-T Interval 498 ms    QTC Calculation (Bezet) 517 ms    Calculated P Axis 87 degrees    Calculated R Axis -2 degrees    Calculated T Axis 74 degrees    Diagnosis       Normal sinus rhythm  Septal infarct , age undetermined  Prolonged QT  Abnormal ECG  When compared with ECG of 06-MAR-2017 15:19,  Significant changes have occurred         EKG interpretation by ED Physician:   2:19 PM. Sinus tachycardia. Left axis deviation.  Rate 162 bpm; UT interval 130 ms; QRS duration 106 ms; QTc 410 ms.     6:01 PM. Normal sinus rhythm. Prolonged QT. Rate 65 bpm; CT interval 170 ms; QRS duration 110 ms; QTc 517 ms. X-Ray, CT or other radiology findings or impressions:  XR CHEST PORT   Final Result   Impression:     1. No acute pulmonary disease. 2. Endotracheal tube appears in satisfactory position. Interpreted by radiologist 16:56   CT HEAD WO CONT   Final Result   IMPRESSION:     1. Mildly motion limited examination, without evidence of acute intracranial abnormality. Of note, noncontrast head CT can be normal in the context of early acute stroke. 2. Unchanged subcortical and periventricular white matter hypoattenuation, a nonspecific finding likely pertaining to chronic ischemic microvascular change. 3. Minimal, nonspecific mucosal thickening of the anterior and posterior ethmoid air cells. Interpreted by radiologist 16:56       Orders:  Orders Placed This Encounter    SEVERE SEPSIS AND SEPTIC SHOCK BUNDLE INITIATED     Standing Status:   Standing     Number of Occurrences:   1    CULTURE, BLOOD     Standing Status:   Standing     Number of Occurrences:   1    CULTURE, BLOOD     Standing Status:   Standing     Number of Occurrences:   1    CULTURE, URINE     Standing Status:   Standing     Number of Occurrences:   1     Order Specific Question:   Reason for Culture     Answer:   Eval of sepsis without a clear source    XR CHEST PORT     Standing Status:   Standing     Number of Occurrences:   1     Order Specific Question:   Reason for Exam     Answer:   Sepsis    CT HEAD WO CONT     Standing Status:   Standing     Number of Occurrences:   1     Order Specific Question:   Transport     Answer:   Stretcher [5]     Order Specific Question:   Is Patient Allergic to Contrast Dye? Answer:   Unknown    LACTIC ACID, PLASMA     If initial lactate level is greater than or equal to 2, draw second lactate in 4 hours.      Standing Status:   Standing     Number of Occurrences:   2    URINALYSIS W/ RFLX MICROSCOPIC Standing Status:   Standing     Number of Occurrences:   1    METABOLIC PANEL, COMPREHENSIVE     Standing Status:   Standing     Number of Occurrences:   1    CBC WITH AUTOMATED DIFF     Standing Status:   Standing     Number of Occurrences:   1    BLOOD GAS, ARTERIAL     Standing Status:   Standing     Number of Occurrences:   1    URINE MICROSCOPIC ONLY     Standing Status:   Standing     Number of Occurrences:   1    CBC W/O DIFF     Standing Status:   Standing     Number of Occurrences:   3    METABOLIC PANEL, BASIC     Standing Status:   Standing     Number of Occurrences:   3    TSH 3RD GENERATION     Standing Status:   Standing     Number of Occurrences:   1    T4, FREE     Standing Status:   Standing     Number of Occurrences:   1    AMMONIA     Standing Status:   Standing     Number of Occurrences:   1    TROPONIN I     Standing Status:   Standing     Number of Occurrences:   1    TROPONIN I     Standing Status:   Standing     Number of Occurrences:   1    METABOLIC PANEL, BASIC     Standing Status:   Standing     Number of Occurrences:   1    BLOOD GAS, ARTERIAL     Standing Status:   Standing     Number of Occurrences:   15    MAGNESIUM     Standing Status:   Standing     Number of Occurrences:   1    PROTHROMBIN TIME + INR     Standing Status:   Standing     Number of Occurrences:   1    VITAL SIGNS - PER UNIT ROUTINE     PER UNIT ROUTINE     Standing Status:   Standing     Number of Occurrences:   1    STRICT I & O     Standing Status:   Standing     Number of Occurrences:   1    NEUROLOGIC STATUS ASSESSMENT - PER UNIT ROUTINE     PER UNIT ROUTINE     Standing Status:   Standing     Number of Occurrences:   1    NOTIFY PROVIDER: SPECIFY Notify provider within one hour to start vasopressors if patient is unable to maintain a MAP of greater than or equal to 65 mmHg despite fluid resuscitation CONTINUOUS STAT     Standing Status:   Standing     Number of Occurrences:   1     Order Specific Question:   Please describe the test or procedure you would like to order. Answer:   Notify provider within one hour to start vasopressors if patient is unable to maintain a MAP of greater than or equal to 65 mmHg despite fluid resuscitation    CRITICAL CARE (ASAP ONLY)     This order was created via procedure documentation     Standing Status:   Standing     Number of Occurrences:   1    IP CONSULT TO INTENSIVIST     Standing Status:   Standing     Number of Occurrences:   1     Order Specific Question:   Reason for Consult: Answer:   Patient unresponsive     Order Specific Question:   Did you call or speak to the consulting provider? Answer:   No     Order Specific Question:   Consult To     Answer:   ED    RT--MECHANICAL VENTILATOR     Standing Status:   Standing     Number of Occurrences:   1     Order Specific Question:   Mode:     Answer:   ASSIST CONTROL     Order Specific Question:   PEEP     Answer:   5.0 CM/H2O     Order Specific Question:   Rate:      Answer:   10     Order Specific Question:   Tidal Volume     Answer:   500    POC CHEM8     Standing Status:   Standing     Number of Occurrences:   1    POC LACTIC ACID     Standing Status:   Standing     Number of Occurrences:   1    POC TROPONIN-I     Standing Status:   Standing     Number of Occurrences:   1    POC G3     Standing Status:   Standing     Number of Occurrences:   1    EKG, 12 LEAD, INITIAL     Standing Status:   Standing     Number of Occurrences:   1     Order Specific Question:   Reason for Exam:     Answer:   Sepsis    EKG, 12 LEAD, SUBSEQUENT     Standing Status:   Standing     Number of Occurrences:   1     Order Specific Question:   Reason for Exam:     Answer:   rhythm change    2D ECHO COMPLETE ADULT (TTE) W OR WO CONTR     Standing Status:   Standing     Number of Occurrences:   1     Order Specific Question:   Reason for Exam:     Answer:   s/p cardiac arrest     Order Specific Question:   Contrast Enhancement (Bubble Study, Definity, Optison) may be used if criteria listed in established evidence-based protocol has been identified. Answer: Yes    TYPE & SCREEN     ENTER SURGERY DATE IF FOR PRE-OP TESTING. Standing Status:   Standing     Number of Occurrences:   1     Order Specific Question:   Has patient been transfused or pregnant in the last 3 mos. ? Answer:   Unknown    SALINE LOCK IV ONE TIME STAT     Standing Status:   Standing     Number of Occurrences:   1    sodium chloride (NS) flush 5-10 mL    sodium chloride 0.9 % bolus infusion 1,836 mL    vancomycin (VANCOCIN) 1,000 mg in 0.9% sodium chloride (MBP/ADV) 250 mL adv     Order Specific Question:   Antibiotic Indications     Answer:   Sepsis of Unknown Etiology    DISCONTD: piperacillin-tazobactam (ZOSYN) 4.5 g in 0.9% sodium chloride (MBP/ADV) 100 mL MBP     Order Specific Question:   Antibiotic Indications     Answer:   Sepsis of Unknown Etiology    levoFLOXacin (LEVAQUIN) 750 mg in D5W IVPB     Order Specific Question:   Antibiotic Indications     Answer:   Sepsis of Unknown Etiology    magnesium sulfate 2 g/50 ml 2 gram/50 mL (4 %) IVPB premix pgbk     Ragena Berwyn   : cabinet override    magnesium sulfate 2 g/50 ml IVPB (premix or compounded)    0.9% sodium chloride infusion 1,000 mL    sodium chloride 0.9 % bolus infusion 1,000 mL    dexmedetomidine (PRECEDEX) 400 mcg in 0.9% sodium chloride 100 mL infusion     Order Specific Question:   Titrate Infusion? Answer:   Yes     Order Specific Question:   Initial Infusion Rate: Answer:   0.4 mcg/kg/hr     Order Specific Question:   Titrate Every 15 Minutes to Achieve Goal of Therapy by: Answer:   0.1 mcg/kg/hr     Order Specific Question:   Goal of Therapy:     Answer:   RASS 0 to -1     Order Specific Question:   Contact Physician for:      Answer:   SBP less than 90 mmHg    piperacillin-tazobactam (ZOSYN) 4.5 g in 0.9% sodium chloride (MBP/ADV) 100 mL MBP Order Specific Question:   Antibiotic Indications     Answer:   Sepsis of Unknown Etiology    piperacillin-tazobactam (ZOSYN) 3.375 g in 0.9% sodium chloride (MBP/ADV) 100 mL MBP     Order Specific Question:   Antibiotic Indications     Answer:   Sepsis of Unknown Etiology    please update pt height into [employer]. thank you     pantoprazole (PROTONIX) 40 mg in sodium chloride 0.9 % 10 mL injection     Order Specific Question:   PPI INDICATION     Answer:   SUP Prophylaxis    DISCONTD: amiodarone (NEXTERONE) 360 mg in dextrose 200 mL (1.8 mg/mL) infusion    DISCONTD: amiodarone (CORDARONE) 150 mg in dextrose 5% 100 mL bolus infusion    0.9% sodium chloride infusion    IP CONSULT TO HOSPITALIST     Standing Status:   Standing     Number of Occurrences:   1     Order Specific Question:   Reason for Consult: Answer:   ALTERED MENTAL STATUS     Order Specific Question:   Did you call or speak to the consulting provider? Answer:   No     Order Specific Question:   Consult To     Answer:   ED    IP CONSULT TO [id number] Sot[address]rit to dose vancomycin after once dose. Standing Status:   Standing     Number of Occurrences:   1     Order Specific Question:   Antibiotic Indications     Answer:   Sepsis of Unknown Etiology       Reevaluation, Progress notes, Consult notes, or additional Procedure notes:   4:15 PM Patient appears to be posturing, so we are expediting CT scan.     5:40 PM I reevaluated the patient. Patient has good distal pulses. Heart rate improved. Blood pressure improved. 5:49 PM Consult: I discussed care with Dr. Bharti Smith (intensivist). It was a standard discussion including patient history, chief complaint, available diagnostic results, and predicted treatment course. 6:19 PM Consult: I discussed care with Dr. Macario Hollins (hospitalist).  It was a standard discussion including patient history, chief complaint, available diagnostic results, and predicted treatment course. He agrees to admit. Disposition:  Diagnosis:   1. Sepsis, due to unspecified organism ([address]    2. Unresponsive    3. Acute cystitis with hematuria        Disposition: Admit    Follow-up Information     None           Patient's Medications   Start Taking    No medications on file   Continue Taking    ASPIRIN, BUFFERED 81 MG TAB    Take 81 mg by mouth. ATORVASTATIN (LIPITOR) 80 MG TABLET    Take 40 mg by mouth daily. CYANOCOBALAMIN (VITAMIN B-12) 1,000 MCG TABLET    Take 1 Tab by mouth daily. CYANOCOBALAMIN (VITAMIN B12) 1,000 MCG/ML INJECTION    1 mL by IntraMUSCular route daily. DARIFENACIN (ENABLEX) 7.5 MG ER TABLET    Take 7.5 mg by mouth daily. DIAZEPAM (VALIUM) 5 MG TABLET    Take 2.5 mg by mouth two (2) times a day. FLUOXETINE (PROZAC) 40 MG CAPSULE    Take 40 mg by mouth daily. LISINOPRIL (PRINIVIL, ZESTRIL) 2.5 MG TABLET    Take  by mouth daily. METFORMIN HCL (METFORMIN PO)    Take 500 mg by mouth Before breakfast, lunch, and dinner. METOPROLOL (LOPRESSOR) 25 MG TABLET    Take 0.5 Tabs by mouth every twelve (12) hours. MULTIVITAMIN (ONE A DAY) TABLET    Take 1 Tab by mouth daily. OMEPRAZOLE (PRILOSEC PO)    Take  by mouth. OTHER        OTHER        POTASSIUM 99 MG TABLET    Take 99 mg by mouth daily. PRAMIPEXOLE (MIRAPEX) 1.5 MG TABLET    Take 1.5 mg by mouth three (3) times daily. ROPINIROLE (REQUIP) 0.5 MG TABLET    Take 0.5 mg by mouth three (3) times daily. SITAGLIPTIN (JANUVIA) 50 MG TABLET    Take 50 mg by mouth daily. TAMSULOSIN (FLOMAX) 0.4 MG CAPSULE    Take 0.4 mg by mouth daily.    These Medications have changed    No medications on file   Stop Taking    No medications on file         [id number] [facility] for and in the presence of Summer Goodwin MD (03/06/17)      Physician Attestation  I personally performed the services described in this documentation, reviewed and edited the documentation which was dictated to the diana in my presence, and it accurately records my words and actions.     Clem Workman MD (03/06/17)      Signed by: Diana Mariee, March 06, 2017 at 6:40 PM

## 2017-03-06 NOTE — ED NOTES
The Sepsis Screening has been completed on arrival in the Emergency Department. Vital signs:  Patient Vitals for the past 4 hrs:   Pulse Resp BP   03/06/17 1515 (!) 166 22 (!) 61/41   03/06/17 1512 (!) 150 20 117/57            =monitored (data validate)  MAP (Calculated): (!) 48    =calculated (manual entry)    Patient meets 2 or more SIRS criteria with suspected infection? YES    IF ANSWER IS YES, INITIATE NURSE DRIVEN SEPSIS ORDERS OR REQUEST SEPSIS BUNDLE ORDER BY PROVIDER.      SIRS Criteria is achieved when two or more of the following are present:    Temperature < 96.8°F (36°C) or > 100.9°F (38.3°C)   Heart Rate > 90 beats per minute   Respiratory Rate > 20 beats per minute   WBC count > 12,000 or <4,000 or > 10% bands

## 2017-03-06 NOTE — IP AVS SNAPSHOT
Mariel Rueda 
 
 
 [address] [place] [address] 
[phone] Patient: Selena Abdalla MRN: [id number] SUF:[id number] You are allergic to the following Allergen Reactions Sulfa (Sulfonamide Antibiotics) Unknown (comments) Sulfa (Sulfonamide Antibiotics) Unknown (comments) Recent Documentation Height Weight BMI Smoking Status 1.88 m 71.9 kg 20.35 kg/m2 Never Smoker Emergency Contacts Name Discharge Info Relation Home Work Mobile Yahir Marquez [5] [phone] Remington Clark  Son [22] [phone] Magdi Sanchez  Spouse [3] [phone] About your hospitalization You were admitted on:  March 6, 2017 You last received care in the:  [address] [facility] You were discharged on:  March 13, 2017 Unit phone number:  [phone] Why you were hospitalized Your primary diagnosis was:  Not on File Your diagnoses also included:  Respiratory Failure (Hcc), Sepsis (Hcc), Encephalopathy Acute, Debility Providers Seen During Your Hospitalizations Provider Role Specialty Primary office phone Pedro Malik MD Attending Provider Emergency Medicine [phone] Zee El MD Attending Provider [facility] [phone] Dick Fuller MD Attending Provider Internal Medicine [phone] Jaya Hernandez MD Attending Provider Internal Medicine [phone] Your Primary Care Physician (PCP) Primary Care Physician Office Phone Office Fax Maxine Jose [phone] Follow-up Information Follow up With Details Comments Contact Info Gwyn Davies MD  pcp or in facility physician [address] [place] [address] 312 [facility] [phone] 
[phone] Current Discharge Medication List  
  
START taking these medications Dose & Instructions Dispensing Information Comments Morning Noon Evening Bedtime  
 atropine 1 % ophthalmic solution Your last dose was: Your next dose is: Other:  _________ Dose:  3 Drop Apply 3 Drops to eye three (3) times daily as needed for Other (secretions). Quantity:  5 mL Refills:  0  
     
   
   
   
  
 bisacodyl 10 mg suppository Commonly known as:  DULCOLAX Your last dose was: Your next dose is: Other:  _________ Dose:  10 mg Insert 10 mg into rectum daily as needed. Quantity:  28 Each Refills:  0  
     
   
   
   
  
 morphine 100 mg/5 mL (20 mg/mL) concentrated solution Commonly known as:  [place] Pastel Your last dose was: Your next dose is: Other:  _________ Dose:  10 mg Take 0.5 mL by mouth every two (2) hours as needed for Pain. Max Daily Amount: 120 mg.  
 Quantity:  30 mL Refills:  0  
     
   
   
   
  
 scopolamine 1.5 mg (1 mg over 3 days) Pt3d Commonly known as:  TRANSDERM-SCOP Your last dose was: Your next dose is: Other:  _________ Dose:  1.5 mg  
1 Patch by TransDERmal route every seventy-two (72) hours. Quantity:  10 Patch Refills:  0 STOP taking these medications   
 aspirin, buffered 81 mg Tab  
   
  
 cyanocobalamin 1,000 mcg tablet Commonly known as:  VITAMIN B-12  
   
  
 cyanocobalamin 1,000 mcg/mL injection Commonly known as:  VITAMIN B12  
   
  
 ENABLEX 7.5 mg ER tablet Generic drug:  darifenacin FLOMAX 0.4 mg capsule Generic drug:  tamsulosin JANUVIA 50 mg tablet Generic drug:  SITagliptin LIPITOR 80 mg tablet Generic drug:  atorvastatin  
   
  
 lisinopril 2.5 mg tablet Commonly known as:  PRINIVIL, ZESTRIL  
   
  
 METFORMIN PO  
   
  
 metoprolol tartrate 25 mg tablet Commonly known as:  LOPRESSOR  
   
  
 MIRAPEX 1.5 mg tablet Generic drug:  pramipexole  
   
  
 multivitamin tablet Commonly known as:  ONE A DAY  
   
  
 OTHER  
   
  
 potassium 99 mg tablet PRILOSEC PO  
   
  
 PROzac 40 mg capsule Generic drug:  FLUoxetine  
   
  
 rOPINIRole 0.5 mg tablet Commonly known as:  REQUIP  
   
  
 VALIUM 5 mg tablet Generic drug:  diazePAM  
   
  
  
  
Where to Get Your Medications Information on where to get these meds will be given to you by the nurse or doctor. ! Ask your nurse or doctor about these medications  
  atropine 1 % ophthalmic solution  
 bisacodyl 10 mg suppository  
 morphine 100 mg/5 mL (20 mg/mL) concentrated solution  
 scopolamine 1.5 mg (1 mg over 3 days) Pt3d Discharge Instructions DISCHARGE SUMMARY from Nurse The following personal items are in your possession at time of discharge: 
 
Dental Appliances: None Visual Aid: None Home Medications: None Jewelry: None Clothing: None Other Valuables: None Personal Items Sent to Safe: none PATIENT INSTRUCTIONS: 
 
 
F-face looks uneven A-arms unable to move or move unevenly S-speech slurred or non-existent T-time-call 911 as soon as signs and symptoms begin-DO NOT go Back to bed or wait to see if you get better-TIME IS BRAIN. Warning Signs of HEART ATTACK Call 911 if you have these symptoms: 
? Chest discomfort. Most heart attacks involve discomfort in the center of the chest that lasts more than a few minutes, or that goes away and comes back. It can feel like uncomfortable pressure, squeezing, fullness, or pain. ? Discomfort in other areas of the upper body.  Symptoms can include pain or discomfort in one or both arms, the back, neck, jaw, or stomach. ? Shortness of breath with or without chest discomfort. ? Other signs may include breaking out in a cold sweat, nausea, or lightheadedness. Don't wait more than five minutes to call [address]! Fast action can save your life. Calling 911 is almost always the fastest way to get lifesaving treatment. Emergency Medical Services staff can begin treatment when they arrive  up to an hour sooner than if someone gets to the hospital by car. The discharge information sent with Patient to Yariel Rich for nurse to review. Discharge Instructions Attachments/References SEPSIS (ENGLISH) Discharge Orders None Ontela Announcement We are excited to announce that we are making your provider's discharge notes available to you in Ontela. You will see these notes when they are completed and signed by the physician that discharged you from your recent hospital stay. If you have any questions or concerns about any information you see in Ontela, please call the Health Information Department where you were seen or reach out to your Primary Care Provider for more information about your plan of care. Introducing [facility]! Dear Becca Rich: 
Thank you for requesting a Ontela account. Our records indicate that you already have an active Ontela account. You can access your account anytime at [email or web address] Did you know that you can access your hospital and ER discharge instructions at any time in Ontela? You can also review all of your test results from your hospital stay or ER visit. Additional Information If you have questions, please visit the Frequently Asked Questions section of the Ontela website at [email or web address] Remember, Ontela is NOT to be used for urgent needs. For medical emergencies, dial 911. Now available from your iPhone and Android! General Information Please provide this summary of care documentation to your next provider. Patient Signature:  ____________________________________________________________ Date:  ____________________________________________________________  
  
Isabel Newcomer Provider Signature:  ____________________________________________________________ Date:  ____________________________________________________________ More Information Sepsis: Care Instructions Your Care Instructions Sepsis is an infection that has spread throughout your body. It is a life-threatening condition and often causes extremely low blood pressure. This can lead to problems with many different organs. The cause of sepsis is not always clear, but it can happen as part of a long-term or sudden illness. Sometimes even a mild illness can lead to sepsis. Follow-up care is a key part of your treatment and safety. Be sure to make and go to all appointments, and call your doctor if you are having problems. Its also a good idea to know your test results and keep a list of the medicines you take. How can you care for yourself at home? · If your doctor prescribed antibiotics, take them as directed. Do not stop taking them just because you feel better. You need to take the full course of antibiotics. · Drink plenty of fluids, enough so that your urine is light yellow or clear like water. Choose water or caffeine-free clear liquids until you feel better. If you have kidney, heart, or liver disease and have to limit fluids, talk with your doctor before you increase your fluid intake. You can try rehydration drinks, such as Gatorade or Powerade. · Do not drink alcohol. · Eat a healthy diet. Include fruits, vegetables, and whole grains in your diet every day. · Walking is an easy way to get exercise.  Gradually increase the amount you walk every day. Make sure your doctor knows that you are starting an exercise program. 
· Do not smoke or use other tobacco products. If you need help quitting, talk to your doctor about stop-smoking programs and medicines. These can increase your chances of quitting for good. When should you call for help? Call 911 anytime you think you may need emergency care. For example, call if: 
· You passed out (lost consciousness). Call your doctor now or seek immediate medical care if: 
· You have a fever or chills. · You have cool, pale, or clammy skin. · You are dizzy or lightheaded, or you feel like you may faint. · You have any new symptoms, such as a cough, pain in one part of your body, or urinary problems. Watch closely for changes in your health, and be sure to contact your doctor if: 
· You do not get better as expected. Where can you learn more? Go to http://tania-carlos.info/. Enter M160 in the search box to learn more about \"Sepsis: Care Instructions. \" Current as of: May 27, 2016 Content Version: 11.1 © 8250-3420 [employer]. Care instructions adapted under license by [employer] (which disclaims liability or warranty for this information). If you have questions about a medical condition or this instruction, always ask your healthcare professional. [address] any warranty or liability for your use of this information.

## 2017-03-06 NOTE — ED TRIAGE NOTES
Pt arrived via rescue from [facility]. Per EMS, staff at nursing home stated they initiated CPR prior to EMS arrival. Upon EMS arrival on scene pt was unresponsive with minimal respiratory effort. EMS established 20G IV in left wrist and administered 30mg etomidate and 100mg succs. Pt intubated with 7.0fr, 21 at the teeth. Positive color change, positive bilateral breath sounds. EMS administered 5mg versed. Dr. Chelsie Lopez, RN, RT, and ED tech at bedside. Pt connected to full cardiac monitor.

## 2017-03-06 NOTE — PROGRESS NOTES
[facility] Dosing Services Update Note     Pharmacy dosing Vancomycin IV empirically for treatment of sepsis of unknown etiology     Labs:   Bun/Serum Creatinine - 87 mg/dl / 3.24 mg/dl  CrCl - 16 ml/min    Other antimicrobials:   Zosyn 3.375g IV q6h   Levaquin 750mg IV q48h     Cultures:  Pending     Plan: Loading dose 1.5g IV x 1. Maintenance dose of 750mg IV q36hrs. Goal level 15-20. Trough due on 3/9. Pharmacy to follow and will make changes to dose and/or frequency based on clinical status.     Thank you for this consult

## 2017-03-06 NOTE — CONSULTS
[facility]  Pulmonary, Critical Care, and Sleep Medicine    Name: Jelena Smallwood MRN: [id number]   : 1935 Hospital: [facility]   Date: 3/6/2017        [facility] Initial Patient Consult  Consult requested by: Dr. Lorrie Domingo                                           Reason for Consult: MV    [x] I have reviewed the flowsheet and previous days notes. Events, vitals, medications and notes from last 24 hours reviewed. Care plan discussed with staff, patient, family and on multidisciplinary rounds. IMPRESSION:   · Severe Sepsis, likely urinary source, UA +  · Acute respiratory failure requiring MV, possible aspiration  · ?cardiopulmonary arrest, CPR performed by nursing facility  · Hypotension, likely volume depletion, sepsis  · Hypernatremia, hyperchloremia due to volume depletion  · AfibRVR, currently converted spontaneously to NSR  · Metabolic acidosis  · Lactic acidosis  · REID on CKD, likely volume depletion, hypotension  · AMS/acute encephalopathy, multifactorial due to above, metabolic/infectious, unknown baseline  · DM  · Hx HTN  · Hx TIA  · Hx RLS    · Code status: Not on file/FULL      RECOMMENDATIONS:   · CVS: Monitor HD. Currently in NSR, was in [place]. Start amio if needed. ECHO ordered. Trend cardiac enzymes. · Respiratory: MV bundle/protocol. Follow CXR and ABG, adjust vent prn, keep O2 sat >95%. Sputum cx. Aspiration precautions. Duonebs. HOB 30 degrees  · ID:  Sepsis bundle. Trend LA. Continue BS abx with levaquin, vanc and zosyn for now. UA +. Await blood and urine cx. · Hematology/Oncology:  Check coags. Hgb and plts stable. · Renal:  Follow BUN, Cr. Trend Na, Cl. Continue IVF. Monitor UO, sandoval for now  · GI/:  PPI. Trend LFT's  · Endocrine:  Check TSH. Follow FSG, SSI prn  · Neurology/Pain/Sedation:  Precedex prn for comfort. Minimize sedation. CT head negative for acute process.  Continue neuro checks  · Musculoskeletal:  Check CK  · Skin/Wound: Local preventative wound care  · FEN: NPO for now. Replace lytes prn per protocol. IVF  · Prophylaxis: GI Prophylaxis with protonix. DVT Prophylaxis with SCD. · Restraints: B wrist  ·                                                               ·   ·   · Glycemic Control. Glucose stabilizer per ICU protocol when on insulin drip. Maintain blood glucose 140-180. · Replace electrolytes per ICU electrolyte replacement protocol  · Ventilator bundle & Sedation protocol followed. Daily sedation holiday, assessment for readiness for SBT and then re-titrate if required. Chlorhexidine mouth washes. · HOB 30 degree elevation all the time. Aggressive pulmonary toileting. Incentive spirometry when appropriate. Aspiration precautions. · Sepsis bundle and protocol followed. Follow serial lactic acid, frequent BMP check, fluid resuscitation. Follow cultures. Deescalate antibiotic when appropriate. · Sandoval bundle followed, remove sandoval catheter when not critically ill. · Central Line bundle followed, remove when not needed. · Skin & Wound care. · Weekly prealbumin, nutritional consult. · PT/OT eval and treat. OOB when appropriate. · Further recommendations will be based on the patient's response to recommended treatment and results of the investigation ordered. · Quality Care: PPI, DVT prophylaxis, HOB elevated, Infection control all reviewed and addressed. · Events and notes from last 24 hours reviewed. Care plan discussed with nursing   · CC Time: >35 min      Subjective/History of Present Illness:     Abiola Joseph has been seen and evaluated in ER Bed 14. Patient is a 80 y.o. male with PMHx significant for depression, DM, afib, HTN, HL, RLS, TIA who presented from [facility] being found unresponsive. CPR was initiated, upon EMS arrival to nursing facility, pt had poor respiratory effort and was intubated. On arrival in the ED temp 95.9, -afib, 's. CXR and CT head showed no acute process.  Labs showed WBC 10, LA 4, hgb 14, plt 232, Trop 0.04, Na 156, Cl 125, Cr 3.24, CO2 17. ABG 7.259/36.1/345/16. 4. UA + He was started on the sepsis protocol, given 4L IVF in the ED with poor UO and started on empiric BS abx. No family is available at bedside. Mr. Nestor Smith will be admitted to the ICU for further evaluation and management. Review of Systems:  Review of systems not obtained due to patient factors. Allergies   Allergen Reactions    Sulfa (Sulfonamide Antibiotics) Unknown (comments)    Sulfa (Sulfonamide Antibiotics) Unknown (comments)      Past Medical History:   Diagnosis Date    Chronic back pain     s/p Leminectomy and discectomy (2011)    Depression     Diabetes mellitus     Herniated disc     status post laminectomy and diskectomy surgery in January    Hyperlipidemia     Hypertension     Hypertensive heart disease     Kidney stones     Nephrocalcinosis     Other ill-defined conditions(799.89)     AFIB    PVC (premature ventricular contraction)     Restless leg syndrome     Stroke ([facility] [address] 6/7/12    TIA    TIA (transient ischemic attack)       Past Surgical History:   Procedure Laterality Date    HX LUMBAR DISKECTOMY  january 2011    LAMINECTOMY,LUMBAR      LITHOTRIPSY        Social History   Substance Use Topics    Smoking status: Never Smoker    Smokeless tobacco: Not on file    Alcohol use No      Family History   Problem Relation Age of Onset    Heart Failure Mother     Stroke Mother     Heart Failure Father       Prior to Admission medications    Medication Sig Start Date End Date Taking? Authorizing Provider   rOPINIRole (REQUIP) 0.5 mg tablet Take 0.5 mg by mouth three (3) times daily. Haider Salamanca MD   multivitamin (ONE A DAY) tablet Take 1 Tab by mouth daily. Haider Salamanca MD   potassium 99 mg tablet Take 99 mg by mouth daily.     Haider Salamanca MD   OTHER     MD JOYCE Rollins MD   cyanocobalamin (VITAMIN B12) 1,000 mcg/mL injection 1 mL by IntraMUSCular route daily. 5/17/14   Jeni Shaw MD   cyanocobalamin (VITAMIN B-12) 1,000 mcg tablet Take 1 Tab by mouth daily. 5/16/14   Jeni Shaw MD   Aspirin, Buffered 81 mg tab Take 81 mg by mouth. Haider Salamanca MD   metoprolol (LOPRESSOR) 25 mg tablet Take 0.5 Tabs by mouth every twelve (12) hours. 4/24/14   Casandra Suarez MD   diazepam (VALIUM) 5 mg tablet Take 2.5 mg by mouth two (2) times a day. Historical Provider   tamsulosin (FLOMAX) 0.4 mg capsule Take 0.4 mg by mouth daily. Historical Provider   darifenacin (ENABLEX) 7.5 mg ER tablet Take 7.5 mg by mouth daily. Historical Provider   FLUoxetine (PROZAC) 40 mg capsule Take 40 mg by mouth daily. Historical Provider   pramipexole (MIRAPEX) 1.5 mg tablet Take 1.5 mg by mouth three (3) times daily. Historical Provider   METFORMIN HCL (METFORMIN PO) Take 500 mg by mouth Before breakfast, lunch, and dinner. Historical Provider   OMEPRAZOLE (PRILOSEC PO) Take  by mouth. Historical Provider   atorvastatin (LIPITOR) 80 mg tablet Take 40 mg by mouth daily. Historical Provider   sitaGLIPtin (JANUVIA) 50 mg tablet Take 50 mg by mouth daily. Historical Provider   lisinopril (PRINIVIL, ZESTRIL) 2.5 mg tablet Take  by mouth daily. Historical Provider     Current Facility-Administered Medications   Medication Dose Route Frequency    vancomycin (VANCOCIN) 1,000 mg in 0.9% sodium chloride (MBP/ADV) 250 mL adv  1,000 mg IntraVENous ONCE    levoFLOXacin (LEVAQUIN) 750 mg in D5W IVPB  750 mg IntraVENous Q48H    dexmedetomidine (PRECEDEX) 400 mcg in 0.9% sodium chloride 100 mL infusion  0.2-0.7 mcg/kg/hr IntraVENous TITRATE    piperacillin-tazobactam (ZOSYN) 3.375 g in 0.9% sodium chloride (MBP/ADV) 100 mL MBP  3.375 g IntraVENous Q6H    please update pt height into [facility]. thank you    Other ONCE       Lines: All central lines examined by me. No signs of erythema, induration, discharge.       Peripheral Intravenous Line:  Peripheral IV 17 Left Wrist (Active)   Site Assessment Clean, dry, & intact 3/6/2017  3:30 PM   Phlebitis Assessment 0 3/6/2017  3:30 PM   Infiltration Assessment 0 3/6/2017  3:30 PM   Dressing Status Clean, dry, & intact 3/6/2017  3:30 PM       Peripheral IV 17 Right Antecubital (Active)   Site Assessment Clean, dry, & intact 3/6/2017  3:34 PM   Phlebitis Assessment 0 3/6/2017  3:34 PM   Infiltration Assessment 0 3/6/2017  3:34 PM   Dressing Status Clean, dry, & intact 3/6/2017  3:34 PM       Peripheral IV 17 Left Antecubital (Active)   Site Assessment Clean, dry, & intact 3/6/2017  3:37 PM   Phlebitis Assessment 0 3/6/2017  3:37 PM   Infiltration Assessment 0 3/6/2017  3:37 PM   Dressing Status Clean, dry, & intact 3/6/2017  3:37 PM      Airway:  Airway - Endotracheal Tube 17 Oral (Active)   Insertion Depth (cm) 22 cm 3/6/2017  3:55 PM   Line Lucas Lips 3/6/2017  3:55 PM   Side Secured Centered 3/6/2017  3:55 PM   Site Assessment Clean, dry, & intact 3/6/2017  3:55 PM       Telemetry:AFIB    Objective:   Vital Signs:    Visit Vitals    BP (!) 114/97    Pulse (!) 117    Temp 95.9 °F (35.5 °C)    Resp 17    Ht 6' 2\" (1.88 m)    Wt 61.2 kg (135 lb)    SpO2 97%    BMI 17.33 kg/m2       O2 Device: Ventilator       Temp (24hrs), Av.2 °F (36.2 °C), Min:95.9 °F (35.5 °C), Max:99.9 °F (37.7 °C)       Intake/Output:   Last shift:       0701 -  1900  In: -   Out: 50 [Urine:50]    Last 3 shifts:        Intake/Output Summary (Last 24 hours) at 17 1731  Last data filed at 17 1531   Gross per 24 hour   Intake                0 ml   Output               50 ml   Net              -50 ml       Last 3 Recorded Weights in this Encounter    17 1508   Weight: 61.2 kg (135 lb)       Ventilator Settings:  Mode Rate Tidal Volume Pressure FiO2 PEEP   Assist control, VC+   400 ml    60 % 5 cm H20     Peak airway pressure: 12 cm H2O    Plateau pressure:     Tidal volume:    Minute ventilation: 10.8 l/min   SPO2      ARDS network Guidelines: Lung protective strategy and Plateau pressure goals less than or equal to 30      Physical Exam:     General/Neurology: Intubated, on precedex, moving all extremities, PERRL, R slightly more sluggish, not FC, appears older than stated age  Head:   Temporal wasting  Eye:   No scleral icterus, +yellow crusted discharge  Nose:   No erythema, +encrusted nasal drainage  Throat:  ETT in place, mm appear dry  Neck:   Supple  Lung:   Adequate air entry bilateral equal, no wheeze/rhonchi  Heart:   Irregularly irreg  Abdomen:  Soft, ND, +BS, no masses, no organomegaly  :  Smith in place draining cloudy yellow urine with heavy sediment  Extremities:  No pedal edema, no cyanosis  Pulses: 2+ and symmetric in DP  Lymphatic:  No cervical, supraclavicular or axillary palpable lymphadenopathy. Musculoskeletal: No joint swelling or tenderness.   Skin:   Warm and dry      Data:       Recent Results (from the past 24 hour(s))   POC TROPONIN-I    Collection Time: 03/06/17  3:16 PM   Result Value Ref Range    Troponin-I (POC) 0.04 0.00 - 0.08 ng/mL   POC CHEM8    Collection Time: 03/06/17  3:17 PM   Result Value Ref Range    CO2 (POC) 19 19 - 24 MMOL/L    Glucose (POC) 161 (H) 74 - 106 MG/DL    BUN (POC) 84 (H) 7 - 18 MG/DL    Creatinine (POC) 3.0 (H) 0.6 - 1.3 MG/DL    GFR-AA (POC) 25 (L) >60 ml/min/1.73m2    GFR, non-AA (POC) 20 (L) >60 ml/min/1.73m2    Sodium (POC) 158 (H) 136 - 145 MMOL/L    Potassium (POC) 4.3 3.5 - 5.5 MMOL/L    Calcium, ionized (POC) 1.15 1.12 - 1.32 MMOL/L    Chloride (POC) 126 (H) 100 - 108 MMOL/L    Anion gap (POC) 18 10 - 20      Hematocrit (POC) 42 36 - 49 %    Hemoglobin (POC) 14.3 12 - 16 G/DL   POC LACTIC ACID    Collection Time: 03/06/17  3:18 PM   Result Value Ref Range    Lactic Acid (POC) 4.0 (HH) 0.4 - 2.0 mmol/L   EKG, 12 LEAD, INITIAL    Collection Time: 03/06/17  3:19 PM   Result Value Ref Range    Ventricular Rate 162 BPM    Atrial Rate 166 BPM    P-R Interval 130 ms    QRS Duration 106 ms    Q-T Interval 250 ms    QTC Calculation (Bezet) 410 ms    Calculated P Axis 58 degrees    Calculated R Axis -38 degrees    Calculated T Axis -160 degrees    Diagnosis       Sinus tachycardia  Left axis deviation  Marked ST abnormality, possible inferior subendocardial injury  Abnormal ECG  When compared with ECG of 24-DEC-2016 16:10,  Significant changes have occurred     METABOLIC PANEL, COMPREHENSIVE    Collection Time: 03/06/17  3:20 PM   Result Value Ref Range    Sodium 156 (H) 136 - 145 mmol/L    Potassium 4.4 3.5 - 5.5 mmol/L    Chloride 125 (H) 100 - 108 mmol/L    CO2 17 (L) 21 - 32 mmol/L    Anion gap 14 3.0 - 18 mmol/L    Glucose 157 (H) 74 - 99 mg/dL    BUN 87 (H) 7.0 - 18 MG/DL    Creatinine 3.24 (H) 0.6 - 1.3 MG/DL    BUN/Creatinine ratio 27 (H) 12 - 20      GFR est AA 23 (L) >60 ml/min/1.73m2    GFR est non-AA 19 (L) >60 ml/min/1.73m2    Calcium 9.8 8.5 - 10.1 MG/DL    Bilirubin, total 0.8 0.2 - 1.0 MG/DL    ALT (SGPT) 51 16 - 61 U/L    AST (SGOT) 43 (H) 15 - 37 U/L    Alk. phosphatase 103 45 - 117 U/L    Protein, total 7.0 6.4 - 8.2 g/dL    Albumin 3.1 (L) 3.4 - 5.0 g/dL    Globulin 3.9 2.0 - 4.0 g/dL    A-G Ratio 0.8 0.8 - 1.7     CBC WITH AUTOMATED DIFF    Collection Time: 03/06/17  3:20 PM   Result Value Ref Range    WBC 10.0 4.6 - 13.2 K/uL    RBC 4.26 (L) 4.70 - 5.50 M/uL    HGB 14.0 13.0 - 16.0 g/dL    HCT 43.3 36.0 - 48.0 %    .6 (H) 74.0 - 97.0 FL    MCH 32.9 24.0 - 34.0 PG    MCHC 32.3 31.0 - 37.0 g/dL    RDW 13.7 11.6 - 14.5 %    PLATELET 098 111 - 224 K/uL    MPV 11.4 9.2 - 11.8 FL    NEUTROPHILS 80 (H) 40 - 73 %    LYMPHOCYTES 14 (L) 21 - 52 %    MONOCYTES 6 3 - 10 %    EOSINOPHILS 0 0 - 5 %    BASOPHILS 0 0 - 2 %    ABS. NEUTROPHILS 8.0 1.8 - 8.0 K/UL    ABS. LYMPHOCYTES 1.4 0.9 - 3.6 K/UL    ABS. MONOCYTES 0.6 0.05 - 1.2 K/UL    ABS. EOSINOPHILS 0.0 0.0 - 0.4 K/UL    ABS.  BASOPHILS 0.0 0.0 - 0.06 K/UL    DF AUTOMATED TYPE & SCREEN    Collection Time: 03/06/17  3:22 PM   Result Value Ref Range    Crossmatch Expiration 03/09/2017     ABO/Rh(D) B POSITIVE     Antibody screen NEG    URINALYSIS W/ RFLX MICROSCOPIC    Collection Time: 03/06/17  3:38 PM   Result Value Ref Range    Color DARK YELLOW      Appearance TURBID      Specific gravity 1.020 1.005 - 1.030      pH (UA) >8.5 (H) 5.0 - 8.0    Protein 300 (A) NEG mg/dL    Glucose NEGATIVE  NEG mg/dL    Ketone TRACE (A) NEG mg/dL    Bilirubin NEGATIVE  NEG      Blood LARGE (A) NEG      Urobilinogen 1.0 0.2 - 1.0 EU/dL    Nitrites NEGATIVE  NEG      Leukocyte Esterase LARGE (A) NEG     URINE MICROSCOPIC ONLY    Collection Time: 03/06/17  3:38 PM   Result Value Ref Range    WBC TOO NUMEROUS TO COUNT 0 - 4 /hpf    RBC TOO NUMEROUS TO COUNT 0 - 5 /hpf    Epithelial cells FEW 0 - 5 /lpf    Bacteria 4+ (A) NEG /hpf   POC G3    Collection Time: 03/06/17  4:53 PM   Result Value Ref Range    Device: VENT      FIO2 (POC) 60 %    pH (POC) 7.259 (L) 7.35 - 7.45      pCO2 (POC) 36.1 35.0 - 45.0 MMHG    pO2 (POC) 345 (H) 80 - 100 MMHG    HCO3 (POC) 16.4 (L) 22 - 26 MMOL/L    sO2 (POC) 100 (H) 92 - 97 %    Base deficit (POC) 11 mmol/L    Mode ASSIST CONTROL      Tidal volume 400 ml    Set Rate 10 bpm    PEEP/CPAP (POC) 5 cmH2O    Allens test (POC) YES      Total resp. rate 18      Site RIGHT RADIAL      Patient temp. 96.8      Specimen type (POC) ARTERIAL      Performed by Adelia Moya     Volume control plus YES           Chemistry Recent Labs      03/06/17   1520   GLU  157*   NA  156*   K  4.4   CL  125*   CO2  17*   BUN  87*   CREA  3.24*   CA  9.8   AGAP  14   BUCR  27*   AP  103   TP  7.0   ALB  3.1*   GLOB  3.9   AGRAT  0.8        Lactic Acid No results found for: LAC  No results for input(s): LAC in the last 72 hours.      Liver Enzymes Protein, total   Date Value Ref Range Status   03/06/2017 7.0 6.4 - 8.2 g/dL Final     Albumin   Date Value Ref Range Status   03/06/2017 3.1 (L) 3.4 - 5.0 g/dL Final     Globulin   Date Value Ref Range Status   03/06/2017 3.9 2.0 - 4.0 g/dL Final     A-G Ratio   Date Value Ref Range Status   03/06/2017 0.8 0.8 - 1.7   Final     AST (SGOT)   Date Value Ref Range Status   03/06/2017 43 (H) 15 - 37 U/L Final     Alk. phosphatase   Date Value Ref Range Status   03/06/2017 103 45 - 117 U/L Final     Recent Labs      03/06/17   1520   TP  7.0   ALB  3.1*   GLOB  3.9   AGRAT  0.8   SGOT  43*   AP  103        CBC w/Diff Recent Labs      03/06/17   1520   WBC  10.0   RBC  4.26*   HGB  14.0   HCT  43.3   PLT  232   GRANS  80*   LYMPH  14*   EOS  0        Cardiac Enzymes Lab Results   Component Value Date/Time    TNIPOC 0.04 03/06/2017 03:16 PM        BNP No results found for: BNP, BNPP, XBNPT     Coagulation No results for input(s): PTP, INR, APTT in the last 72 hours.     No lab exists for component: INREXT      Thyroid  Lab Results   Component Value Date/Time    T4, Total 7.8 04/22/2014 08:25 AM    TSH 3.45 03/06/2016 08:30 AM       Lab Results   Component Value Date/Time    T4, Total 7.8 04/22/2014 08:25 AM        Lipid Panel Lab Results   Component Value Date/Time    Cholesterol, total 135 06/08/2012 05:25 AM    HDL Cholesterol 34 06/08/2012 05:25 AM    LDL, calculated 80.8 06/08/2012 05:25 AM    VLDL, calculated 20.2 06/08/2012 05:25 AM    Triglyceride 101 06/08/2012 05:25 AM    CHOL/HDL Ratio 4.0 06/08/2012 05:25 AM        ABG Recent Labs      03/06/17   1653   PHI  7.259*   PCO2I  36.1   PO2I  345*   HCO3I  16.4*   FIO2I  60        Urinalysis Lab Results   Component Value Date/Time    Color DARK YELLOW 03/06/2017 03:38 PM    Appearance TURBID 03/06/2017 03:38 PM    Specific gravity 1.020 03/06/2017 03:38 PM    pH (UA) >8.5 03/06/2017 03:38 PM    Protein 300 03/06/2017 03:38 PM    Glucose NEGATIVE  03/06/2017 03:38 PM    Ketone TRACE 03/06/2017 03:38 PM    Bilirubin NEGATIVE  03/06/2017 03:38 PM    Urobilinogen 1.0 03/06/2017 03:38 PM    Nitrites NEGATIVE  03/06/2017 03:38 PM    Leukocyte Esterase LARGE 03/06/2017 03:38 PM    Epithelial cells FEW 03/06/2017 03:38 PM    Bacteria 4+ 03/06/2017 03:38 PM    WBC TOO NUMEROUS TO COUNT 03/06/2017 03:38 PM    RBC TOO NUMEROUS TO COUNT 03/06/2017 03:38 PM        Micro  No results for input(s): SDES, CULT in the last 72 hours. No results for input(s): CULT in the last 72 hours. EKG  No results found for this or any previous visit. PFT  No flowsheet data found. Other  ASA reactivity:   Pre-albumin:   Ionized Calcium:   NH4:   T3, FT4:  Cortisol:  Urine Osm:  Urine Lytes:   HbA1c:      Ultrasound       LE Doppler       ECHO  No results found for this or any previous visit. CT (Most Recent)    Results from Hospital Encounter encounter on 03/06/17   CT HEAD WO CONT   Narrative EXAM: CT head    INDICATION: Altered mental status    COMPARISON: Several prior exams, most recently 12/24/2016    TECHNIQUE: Axial CT imaging of the head was performed without intravenous  contrast.    One or more dose reduction techniques were used on this CT: automated exposure  control, adjustment of the mAs and/or kVp according to patient's size, and  iterative reconstruction techniques. The specific techniques utilized on this CT  exam have been documented in the patient's electronic medical record.     _______________    FINDINGS: Examination detail is mildly limited by motion artifact. BRAIN AND POSTERIOR FOSSA: There is mild cortical and cerebellar volume loss  present, the appearance of which is similar to prior examinations. There is a  stable appearance to the ventricular size and configuration. The basilar  cisterns are patent. Mild subcortical and periventricular white matter  hypoattenuation is unchanged. Physiologic bilateral basal ganglia calcifications  are present. There is no intracranial hemorrhage, mass effect, or midline shift. There are no areas of abnormal parenchymal attenuation.     EXTRA-AXIAL SPACES AND MENINGES: There are no abnormal extra-axial fluid  collections. CALVARIUM: Intact. SINUSES: There is mild mucosal thickening of the anterior and posterior ethmoid  air cells. OTHER: Atherosclerotic calcification of bilateral carotid siphons noted. _______________         Impression IMPRESSION:      1. Mildly motion limited examination, without evidence of acute intracranial  abnormality. Of note, noncontrast head CT can be normal in the context of early  acute stroke. 2. Unchanged subcortical and periventricular white matter hypoattenuation, a  nonspecific finding likely pertaining to chronic ischemic microvascular change. 3. Minimal, nonspecific mucosal thickening of the anterior and posterior ethmoid  air cells. XR (Most Recent). CXR  reviewed by me and compared with previous CXR   Results from Hospital Encounter encounter on 03/06/17   XR CHEST PORT   Narrative History: Sepsis. Intubated. Comparison: 4/21/2014. Exam performed AP portable at 1602. Findings: Endotracheal tube tip is about 7 cm above the sumit. Patient rotated  to the right. There is ectasia of the aorta. Heart size normal. No pneumothorax. No acute infiltrate. No significant effusion. Pulmonary vascularity appears  normal.         Impression Impression:      1. No acute pulmonary disease. 2. Endotracheal tube appears in satisfactory position. CXR 3/6/17      Best practice : All below core measures reviewed and addressed:   [x] Antibiotic choice. [x] Severe Sepsis bundles follwed; SIRS screen met? Yes  [x] Fluids. [x] Lactic acid ordered- initial and repeat Q6hrs if elevated till normalized. [x] Cultures drawn. [x] Antibiotic administered within 1hr-ICU and 3hrs ED. [x] Large bore IV- 2 sites          [x] Pressors aim MAP >65mmHg. [x] Steroids. [x] Glycemic control. [x] Infection control. [x] Mech. Ventilated patients- aim to keep peak plateau pressure 34-87MV H2O. [x] HOB at >= 30 degree.   [x] Stress ulcer prophylaxis. [x] DVT prophylaxis. [x] Central Line Bundle Followed. [x] Smith Bundle Followed. [x] Ventilator Bundle Followed. (Daily sedation holiday to assess readiness for weaning from ventilator & SBT trial starting at 6.00 am, HOB 30-degree elevation, Chlorhexidine mouth washes & Routine oral care every 4 hours, Bernadine tube to suction at 20-30 cm H2O, Maintain Bernadine tube with 5-10ml air every 4 hours, DVT prophylaxis, GI prophylaxis etc.). The patient is: [] acutely ill Risk of deterioration: [] moderate    [x] critically ill  [x] high     [x]See my orders for details    My assessment, plan of care, findings, medications, side effects etc were discussed with:  [x] Nurse [] PT/OT    [] Respiratory therapy [x] Dr. Brittney Gallego   [] Pharmacist [] MDR   [] Family: answered all questions to satisfaction [] Patient: answered all questions to satisfaction   [x]  [x]      [] Case & management strategies discussed today on multidisciplinary rounds    High complexity decision making was performed during the evaluation of this patient at high risk for decompensation with multiple organ involvement    [x]Total time spent on reviewing the case/medical record/data/notes/EMR/patient examination/documentation/coordinating care with nurse/consultants, exclusive of procedures with complex decision making performed >35 minutes and > 50% time spent in face to face evaluation, as mentioned above.       Marcin Little  3/6/2017

## 2017-03-06 NOTE — PROGRESS NOTES
Reason for Renal Dosing:  Per Renal Dosing Policy    Patient clinical status and labs ordered/reviewed. Pt Weight Weight: 61.2 kg (135 lb)   Serum Creatinine Lab Results   Component Value Date/Time    Creatinine 1.40 03/11/2016 01:35 PM    Creatinine (POC) 3.0 03/06/2017 03:17 PM       Creatinine Clearance CrCl 37.04 mL/min   BUN Lab Results   Component Value Date/Time    BUN 38 03/11/2016 01:35 PM    BUN (POC) 84 03/06/2017 03:17 PM       WBC Lab Results   Component Value Date/Time    WBC 4.8 03/11/2016 01:35 PM      Temperature     HR Pulse (Heart Rate): (!) 166       BP BP: (!) 61/41             Drug type: Antibiotic indicated for Sepsis of Unknown Etiology    Drug/dose: Levofloxacin 750 mg every 24 hours was adjusted to Levofloxacin 750 mg every 48 hours     Continue to monitor. Signed Willie Henderson, PHARMD  Date 3/6/2017  Time 3:40 PM    Reason for Renal Dosing:  Per Renal Dosing Policy    Patient clinical status and labs ordered/reviewed. Pt Weight Weight: 61.2 kg (135 lb)   Serum Creatinine Lab Results   Component Value Date/Time    Creatinine 1.40 03/11/2016 01:35 PM    Creatinine (POC) 3.0 03/06/2017 03:17 PM       Creatinine Clearance CrCl 37.04 mL/min   BUN Lab Results   Component Value Date/Time    BUN 38 03/11/2016 01:35 PM    BUN (POC) 84 03/06/2017 03:17 PM       WBC Lab Results   Component Value Date/Time    WBC 4.8 03/11/2016 01:35 PM      Temperature     HR Pulse (Heart Rate): (!) 166       BP BP: (!) 61/41             Drug type: Non-Antibiotic      Drug/dose: Piperacillin-Tazobactam 4.5 grams every 6 hours was adjusted to Piperacillin-Tazobactam 4.5 grams once, then 3.375 grams every 6 hours. Continue to monitor.     Signed Willie Henderson, PHARMD  Date 3/6/2017  Time 3:52 PM

## 2017-03-07 PROBLEM — G93.40 ENCEPHALOPATHY ACUTE: Status: ACTIVE | Noted: 2017-01-01

## 2017-03-07 PROBLEM — R53.81 DEBILITY: Status: ACTIVE | Noted: 2017-01-01

## 2017-03-07 NOTE — PROGRESS NOTES
Problem: Ventilator Management  Goal: *Adequate oxygenation and ventilation  Pt. Intubated secondary to respiratory failure and sepsis     Current ventilator settings ACVC+ 10, VT-450, PEEP-5, FIO2-30%     Current ABG- 3/7/2017- 0811- pH-7.289, PCo2-30, PO2-136, PEEP-5, FIo2-30%     Xray-3/7/2017- 0420- Mild pulmonary hypoinflation with linear areas of atelectasis at each lung  Base.      Plan: Maintain ventilatory support     Goal: Adequate oygentation and ventilation

## 2017-03-07 NOTE — PROGRESS NOTES
2100 Received Pt from the ED with PIVs X2, Intubated with 7.0 ETT at 24 cm, and sandoval in place. Assumed care of the pt, assessments completed and charted. Resting quietly in the bed, VSS, no distress noted. Temp via Sandoval catheter is 34.4C, placed Bear Hugger Warmer to warm the Pt.     0430 Dr Dejon Clement notified of low BP and general pt condition. Orders received and written. 0500 Dr Kimberlyn Hopkins notified of critical sodium result, orders received and written. 0700 Presadex titrated off due to diminished responses. [place] Carry off for normothermia. Bedside and Verbal shift change report given to Murtaza FISHER (oncoming nurse) by Gabino Tran RN (offgoing nurse). Report included the following information SBAR, Kardex, Intake/Output, MAR, Recent Results and Cardiac Rhythm SB c 1st Degree AVB and BBB.

## 2017-03-07 NOTE — PROGRESS NOTES
Problem: Mobility Impaired (Adult and Pediatric)  Goal: *Acute Goals and Plan of Care (Insert Text)  STG for 3.7.17 to be completed by 3.14.17 (7 days). 1. Pt will complete supine BLE PROM/AAROM therex to [facility] in order to decrease contractures and risk for skin deterioration. Outcome: Progressing Towards Goal  PHYSICAL THERAPY EVALUATION     Patient: Donald Auguste (51 y.o. male)  Date: 3/7/2017  Primary Diagnosis: Respiratory failure ([facility] [address]  Sepsis ([facility] [address]   Precautions: Fall         ASSESSMENT : 81 yo male presents with impaired functional mobility, increased tone, and generalized weakness s/p respiratory failure. Per Murtaza RN, pt okay to see at bedlevel. Pt unable to open eyes during session, did not follow commands. Pt demo (+) multi-beat clonus (B), reflex grasp of (L) hand, presents with contracted BLE into knee flexion. PLOF is unknown, questionable acute vs chronic contractures due to increased tone vs chronic disuse. Education ongoing pending pt's medical stability and alertness. Pt will benefit from functional maintenance program for supine therex in order to maintain current ROM and strength and decrease risk of contractures. Rehab tech previously educated on therex to be completed as appropriate. Pt positioned with (B) hips in neutral, pillow placed between legs to prevent skin breakdown, prevalon boots on (B), Murtaza RN in room. Recommend continued therapy during acute stay. Eval Complexity: History: HIGH Complexity :3+ comorbidities / personal factors will impact the outcome/ POC Exam:HIGH Complexity : 4+ Standardized tests and measures addressing body structure, function, activity limitation and / or participation in recreation  Presentation: HIGH Complexity : Unstable and unpredictable characteristics  Clinical Decision Making:High Complexity respiratory failure, asystole, intubated. Pt with (+) neuro signs of clonus and increased tone, unable to respond. Does not withdrawal to noxious stim on BLE 3/3. Overall Complexity:HIGH      Patient will benefit from skilled intervention to address the above impairments. Patients rehabilitation potential is considered to be Guarded  Factors which may influence rehabilitation potential include:   [ ]         None noted  [X]         Mental ability/status  [X]         Medical condition  [ ]         Home/family situation and support systems  [ ]         Safety awareness  [ ]         Pain tolerance/management  [ ]         Other:        PLAN :Recommend continued therapy during acute stay. Recommendations and Planned Interventions:  [X]           Bed Mobility Training             [ ]    Neuromuscular Re-Education  [ ]           Transfer Training                   [ ]    Orthotic/Prosthetic Training  [ ]           Gait Training                          [ ]    Modalities  [X]           Therapeutic Exercises          [ ]    Edema Management/Control  [X]           Therapeutic Activities            [X]    Patient and Family Training/Education  [ ]           Other (comment):     Frequency/Duration: Patient will be followed by physical therapy 3-5 times a week by rehab tech for [facility] to address goals. Discharge Recommendations: Bharath Sabillon  Further Equipment Recommendations for Discharge: To be determined at next level of care prior to home. SUBJECTIVE:   Patient unable to verbalize, intubated, unresponsive.       OBJECTIVE DATA SUMMARY:       Past Medical History:   Diagnosis Date    Chronic back pain       s/p Leminectomy and discectomy (2011)    Depression      Diabetes mellitus      Herniated disc       status post laminectomy and diskectomy surgery in January    Hyperlipidemia      Hypertension      Hypertensive heart disease      Kidney stones      Nephrocalcinosis      Other ill-defined conditions(799.89)       AFIB    PVC (premature ventricular contraction)      Restless leg syndrome      Stroke ([facility] [address] 6/7/12     TIA    TIA (transient ischemic attack)       Past Surgical History:   Procedure Laterality Date    HX LUMBAR DISKECTOMY   january 2011   Anshul Sis        LITHOTRIPSY         Barriers to Learning/Limitations: yes;  sensory deficits-vision/hearing/speech and altered mental status (i.e.Sedation, Confusion)  Compensate with: visual, verbal, tactile, kinesthetic cues/model  GCODES(GP):Mobility  Current  CN= 100%   Goal  CL= 60-79%. The severity rating is based on the [facility] Sitting Balance Scale  0: Pt performs 25% or less of sitting activity (Max assist) CN, 100% impaired. 1: Pt supports self with upper extremities but requires therapist assistance. Pt performs 25-50% of effort (Mod assist) CM, 80% to <100% impaired. 1+: Pt supports self with upper extremities but requires therapist assistance. Pt performs >50% effort. (Min assist). CL, 60% to <80% impaired. 2: Pt supports self independently with both upper extremities. CL, 60% to <80% impaired. 2+: Pt support self independently with 1 upper extremity. CK, 40% to <60% impaired. 3: Pt sits without upper extremity support for up to 30 seconds. CK, 40% to <60% impaired. 3+: Pt sits without upper extremity support for 30 seconds or greater. CJ, 20% to <40% impaired. 4: Pt moves and returns trunkal midpoint 1-2 inches in one plane. CJ, 20% to <40% impaired. 4+: Pt moves and returns trunkal midpoint 1-2 inches in multiple planes. CI, 1% to <20% impaired. 5: Pt moves and returns trunkal midpoint in all planes greater than 2 inches. CH, 0% impaired. Prior Level of Function/Home Situation: Pt is unable to participate, unsure of prior level at this time. Recommend continued follow up to determine PLOF.   Home Situation  Home Environment: [address] [facility] Name: Samuel Mancia  Critical Behavior:  Neurologic State: Responds to noxious stimuli only;Unresponsive  Orientation Level: Unable to verbalize  Cognition: No command following  Psychosocial  Patient Behaviors: Not interactive  Purposeful Interaction: No (comment)  Caritas Process: Attend basic human needs;Create healing environment  Caring Interventions: Reassure  Reassure: Informing;Caring rounds  Therapeutic Modalities: Intentional therapeutic touch  Skin Condition/Temp: Warm;Dry  Skin Integrity: Intact  Skin Integumentary  Skin Color: Pale;Pink  Skin Condition/Temp: Warm;Dry  Skin Integrity: Intact  Turgor: Epidermis thin w/ loss of subcut tissue  Hair Growth: Sparce  Varicosities: Absent  Strength:    Strength: Grossly decreased, non-functional   Tone & Sensation:   Tone: Abnormal (contracted into knee flexion (B))  Sensation:  (unable to accurately assess)  Range Of Motion:  AROM: Grossly decreased, non-functional  PROM: Grossly decreased, non-functional (40-90 degrees knee flexion (B))  Functional Mobility:  Bed Mobility:  Rolling: Total assistance  Supine to Sit:  (did not assess)  Transfers:  Sit to Stand:  (did not assess)  Balance:   Sitting:  (did not assess)  Standing:  (did not assess)  Ambulation/Gait Training:  Gait Description (WDL):  (did not assess)  Therapeutic Exercises:   PROM (B): DF x 10, knee flexion/ext x 10 (40 degrees flx to 90 degrees flx). Pain:  Pre-treatment level: unable to verbalize  Location: n/a  Post-treatment level: unable to verbalize  Activity Tolerance:   Poor, pt unable to actively participate  Please refer to the flowsheet for vital signs taken during this treatment. After treatment:   [ ]         Patient left in no apparent distress sitting up in chair  [X]         Patient left in no apparent distress in bed  [X]         Call bell left within reach  [X]         Nursing notified  [ ]         Caregiver present  [ ]         Bed alarm activated      COMMUNICATION/EDUCATION:   [X]         Fall prevention education was provided and the patient/caregiver indicated understanding.   [X]         Patient/family have participated as able in goal setting and plan of care. [X]         Patient/family agree to work toward stated goals and plan of care. [ ]         Patient understands intent and goals of therapy, but is neutral about his/her participation. [ ]         Patient is unable to participate in goal setting and plan of care.      Thank you for this referral.  Maryam Shaffer PT, DPT   Time Calculation: 8 mins

## 2017-03-07 NOTE — PROGRESS NOTES
[facility] Dosing Services     Pharmacy adjusting dose for Piperacillin-Tazobactam (Zosyn) per renal and extended infusion protocol. Was on a regimen of: 3.375 gm IV q6h    Labs:   Serum Creatinine/CrCl: 2.59 mg/dl/21 ml/min    Plan:  Changed Zosyn to 3.375 gm IV q8h extended 4 hours infusion. Pharmacy to follow and will redose based on renal function changes. Thanks.

## 2017-03-07 NOTE — CONSULTS
Consult Note  Consult requested by: Dr Jamal Ga is a 80 y.o. male [facility] who is being seen on consult for REID. Chief Complaint   Patient presents with   Susana Rush     Admission diagnosis: <principal problem not specified>     HPI:  80 yr old WM brought in from NH for unresponsiveness, shock. He reportedly received CPR. Seen in ICU for septic shock likely from UTI. His Troponin was elevated as well [from CPR?]. baseline creat known to be 1.4-1.6 uptil last year. Presented with creat >3 but has come down since with better MAP on IV pressors. Unable to give any history as intubated ans suspect dementia too. Lactic acid & urine output improved with IVF & better MAP. High Na                       Past Medical History:   Diagnosis Date    Chronic back pain     s/p Leminectomy and discectomy (2011)    Depression     Diabetes mellitus     Herniated disc     status post laminectomy and diskectomy surgery in January    Hyperlipidemia     Hypertension     Hypertensive heart disease     Kidney stones     Nephrocalcinosis     Other ill-defined conditions(799.89)     AFIB    PVC (premature ventricular contraction)     Restless leg syndrome     Stroke ([facility] [address] 6/7/12    TIA    TIA (transient ischemic attack)       Past Surgical History:   Procedure Laterality Date    HX LUMBAR DISKECTOMY  january 2011   Dyke Docker      LITHOTRIPSY         Social History     Social History    Marital status:      Spouse name: N/A    Number of children: N/A    Years of education: N/A     Occupational History    Not on file.      Social History Main Topics    Smoking status: Never Smoker    Smokeless tobacco: Not on file    Alcohol use No    Drug use: Not on file    Sexual activity: Not on file     Other Topics Concern    Not on file     Social History Narrative    ** Merged History Encounter **            Family History   Problem Relation Age of Onset    Heart Failure Mother     Stroke Mother     Heart Failure Father      Allergies   Allergen Reactions    Sulfa (Sulfonamide Antibiotics) Unknown (comments)    Sulfa (Sulfonamide Antibiotics) Unknown (comments)        Home Medications:     Current Facility-Administered Medications   Medication Dose Route Frequency    NOREPINephrine (LEVOPHED) 8,000 mcg in dextrose 5% 250 mL infusion  2-16 mcg/min IntraVENous TITRATE    dextrose 5% infusion  125 mL/hr IntraVENous CONTINUOUS    heparin (porcine) injection 5,000 Units  5,000 Units SubCUTAneous Q12H    sodium chloride (NS) flush 5-10 mL  5-10 mL IntraVENous PRN    levoFLOXacin (LEVAQUIN) 750 mg in D5W IVPB  750 mg IntraVENous Q48H    dexmedetomidine (PRECEDEX) 400 mcg in 0.9% sodium chloride 100 mL infusion  0.2-0.7 mcg/kg/hr IntraVENous TITRATE    piperacillin-tazobactam (ZOSYN) 3.375 g in 0.9% sodium chloride (MBP/ADV) 100 mL MBP  3.375 g IntraVENous Q6H    pantoprazole (PROTONIX) 40 mg in sodium chloride 0.9 % 10 mL injection  40 mg IntraVENous DAILY    chlorhexidine (PERIDEX) 0.12 % mouthwash 10 mL  10 mL Oral Q12H    albuterol-ipratropium (DUO-NEB) 2.5 MG-0.5 MG/3 ML  3 mL Nebulization Q6H PRN    vancomycin (VANCOCIN) 750 mg in 0.9% sodium chloride (MBP/ADV) 250 mL ADV  750 mg IntraVENous Q36H    [START ON 3/9/2017] VANCOMYCIN INFORMATION NOTE   Other ONCE    sodium chloride (NS) flush 5-10 mL  5-10 mL IntraVENous Q8H    sodium chloride (NS) flush 5-10 mL  5-10 mL IntraVENous PRN    insulin lispro (HUMALOG) injection   SubCUTAneous Q6H    glucose chewable tablet 16 g  4 Tab Oral PRN    glucagon (GLUCAGEN) injection 1 mg  1 mg IntraMUSCular PRN    dextrose (D50W) injection syrg 12.5-25 g  25-50 mL IntraVENous PRN    hydrALAZINE (APRESOLINE) 20 mg/mL injection 10 mg  10 mg IntraVENous Q6H PRN       Review of Systems:   Review of Systems:  Unable  Intubated  Demented    Data Review:    Labs: Results:       Chemistry Recent Labs      03/07/17   0700  03/07/17 0335 03/06/17   1520   GLU   --   121*  157*   NA  162*  161*  156*   K   --   3.8  4.4   CL   --   133*  125*   CO2   --   19*  17*   BUN   --   81*  87*   CREA   --   2.59*  3.24*   CA   --   7.9*  9.8   AGAP   --   9  14   BUCR   --   31*  27*   AP   --    --   103   TP   --    --   7.0   ALB   --    --   3.1*   GLOB   --    --   3.9   AGRAT   --    --   0.8      CBC w/Diff Recent Labs      03/07/17   0335  03/06/17   1520   WBC  9.4  10.0   RBC  3.81*  4.26*   HGB  12.2*  14.0   HCT  38.7  43.3   PLT  177  232   GRANS   --   80*   LYMPH   --   14*   EOS   --   0      Coagulation No results for input(s): PTP, INR, APTT in the last 72 hours. No lab exists for component: INREXT    Iron/Ferritin No results for input(s): IRON in the last 72 hours. No lab exists for component: TIBCCALC   BNP No results for input(s): BNPP in the last 72 hours. Cardiac Enzymes Recent Labs      03/07/17   0700   CPK  255   CKND1  4.6*      Liver Enzymes Recent Labs      03/06/17   1520   TP  7.0   ALB  3.1*   AP  103   SGOT  43*      Thyroid Studies Lab Results   Component Value Date/Time    T4, Total 7.8 04/22/2014 08:25 AM    TSH 3.08 03/06/2017 03:20 PM             Physical Assessment:     Visit Vitals    /63    Pulse 67    Temp (!) 37 °F (2.8 °C)    Resp 16    Ht 6' 2\" (1.88 m)    Wt 65.5 kg (144 lb 6.4 oz)    SpO2 98%    BMI 18.54 kg/m2       Intake/Output Summary (Last 24 hours) at 03/07/17 1024  Last data filed at 03/07/17 0800   Gross per 24 hour   Intake          2545.28 ml   Output              830 ml   Net          1715.28 ml     Physial Exam:  General appearance: Intubated  Neurologic: can not assess  Neck: no JVD  Lungs: jacob air entry  Heart: S1S2  Abdomen: Soft  Extremities: no edema    Impression and Plan:     REID on CKD3: nonoliguric. Pre-renal azotemia likely from Uroseptic shock. F/u Echo, Cardiogenic element can not be ruled out. Keep MAP >65 mmHg. IV pressors, Antibiotics, IV Steroids?    No indication for diuresis. HyperNa: agree with hypotonic IVF. Met Acidosis: add IV bicarb. HypoK: replace per protocol. Resp failure: per PCCM. Oxygenating OK on vent.                     Rodney Severino MD  March 7, 2017  Pager : [phone]

## 2017-03-07 NOTE — PROGRESS NOTES
[facility]– [place]   Discharge Planning/ Assessment    Reasons for Intervention: Patient intubated on the ventilator and is unable to provide information. Chart review indicates he is a resident of [facility]. Call placed to [facility] and requested POA paperwork be faxed to [phone]. The patient reportedly has a \" [address], [phone] \" . POA paperwork was not on the patients record and was not scanned into  Media. I have spoken to Mr Gallito Duval who drove down from [facility] to see the patient. He states the patient has been at [facility] for about 3 years. He says the patient co POA is his ex wife , Frankey Mule [phone] to . He also has a sister Josee JAVED . Mr Gallito Duval says the patient is very intelligent and has a PHD.  He stated the patient would want to. .. \" try and get well  but he would not want to be on that machine long. \"  He also mentioned that the patient suffers from severe depression. I am awaiting the POA forms from [facility]. Have placed into e discharge and matched back to [facility].       High Risk Criteria  [x] Yes  []No   Physician Referral  [] Yes  [x]No        Date    Nursing Referral  [] Yes  [x]No        Date    Patient/Family Request  [] Yes  [x]No        Date       Resources:    Medicare  [x] Yes  []No   Medicaid  [x] Yes  []No   No Resources  [] Yes  [x]No   Private Insurance  [] Yes  [x]No    Name/Phone Number    Other  [] Yes  [x]No        (i.e. Workman's Comp)         Prior Services:    Prior Services  [x] Yes  []No   Home Health  [] Yes  [x]No   [address] [place]  [] Yes  [x]No        Number of [address]  [] Yes  [x]No       Meals on Wheels  [] Yes  [x]No   Office on Aging  [] Yes  [x]No   Transportation Services  [x] Yes  []No   Nursing Home  [x] Yes  []No        [facility]  [] Yes  [x]No        Bibaina Gan Name    Other       Information Source:      Information obtained from  [] Patient  [] Parent   [] [address]  [] Child  [] Spouse   [] Significant Other/Partner   [] Friend      [] EMS    [] Nursing Home Chart          [x] Other: chart, Mr Hunter Marin  [x] Yes  []No     Family/Support System:    Patient lives with  [] Alone    [] Spouse   [] Significant Other  [] Children  [] Caretaker   [] Parent  [] Sibling     [x] Other SNF      Other Support System:    Is the patient responsible for care of others  [] Yes  []No   Information of person caring for patient on  discharge SNF   Managers financial affairs independently  [] Yes  [x]No   If no, explain: SNF     Status Prior to Admission:    Mental Status  [x] Awake  [x] Alert  [] Oriented  [] Quiet/Calm [] Lethargic/Sedated   [] Disoriented  [] Restless/Anxious  [] Combative   Personal Care  [] Dependent  [] [address]  [x] Requires Assistance   Meal Preparation Ability  [] Independent   [] Standby Assistance   [] Minimal Assistance   [] Moderate Assistance  [x] Maximum Assistance     [] Total Assistance   Chores  [] Independent with Chores   [] N/A Nursing Home Resident   [x] Requires Assistance   Bowel/Bladder  [] Continent  [] Catheter  [x] Incontinent  [] Ostomy Self-Care    [] Urine Diversion Self-Care  [] Maximum Assistance     [] Total Assistance   Number of Persons needed for assistance    DME at home  [] Chino HivSaniya mata Her  [] Dietra Hives, Straight   [] Commode    [] Bathroom/Grab Bars  [] Hospital Bed  [] Nebulizer  [] Oxygen           [] Raised Toilet Seat  [] Shower Chair  [] Side Rails for Bed   [] Tub Transfer Bench   [] Paul Mindy  [] Jose Arriaga      [] Other:   Vendor      Treatment Presently Receiving:    Current Treatments  [] Chemotherapy  [] Dialysis  [] Insulin  [] IVAB [x] IVF   [] O2  [] PCA   [] PT   [] RT   [] Tube Feedings   [] Wound Care     Psychosocial Evaluation:    Verbalized Knowledge of Disease Process  [] Patient []Family   Coping with Disease Process  [] Patient  []Family   Requires Further Counseling Coping with Disease Process  [] Patient  []Family     Identified Projected Needs:    Home Health Aid  [] Yes  [x]No   Transportation  [x] Yes  []No   Education  [] Yes  [x]No        Specific Education     Financial Counseling  [] Yes  [x]No   Inability to Care for Self/Will Require 24 hour care  [x] Yes  []No   Pain Management  [] Yes  [x]No   Home Infusion Therapy  [] Yes  [x]No   Oxygen Therapy  [] Yes  [x]No   DME  [] Yes  [x]No   Long Term Care Placement  [x] Yes  []No   Rehab  [] Yes  [x]No   Physical Therapy  [] Yes  [x]No   Needs Anticipated At This Time  [x] Yes  []No     Intra-Hospital Referral:    [address]  [] Yes  [x]No     [] Yes  [x]No   Patient Representative  [] Yes  [x]No   Staff for Teaching Needs  [] Yes  [x]No   Specialty Teaching Needs     Diabetic Educator  [] Yes  [x]No   Referral for Diabetic Educator Needed  [] Yes  [x]No  If Yes, place order for Nutritionist or Diabetic Consult     Tentative Discharge Plan:    Home with No Services  [] Yes  [x]No   Home with Home Health Follow-up  [] Yes  [x]No        If Yes, specify type    Home Care Program  [] Yes  [x]No        If Yes, specify type    Meals on Wheels  [] Yes  [x]No   Office of Aging  [] Yes  [x]No   NHP  [] Yes  [x]No   Return to the Nursing Home  [x] Yes  []No   Rehab Therapy  [x] Yes  []No   Acute Rehab  [] Yes  []No   Subacute Rehab  [x] Yes  []No   Private Care  [] Yes  [x]No   Substance Abuse Referral  [] Yes  [x]No   Transportation  [x] Yes  []No   Chore Service  [] Yes  [x]No   Inpatient Hospice  [] Yes  [x]No   OP RT  [] Yes  [x] No   OP Hemo  [] Yes  [x] No   OP PT  [] Yes  [x]No   Support Group  [] Yes  [x]No   Reach to Recovery  [] Yes  [x]No   OP Oncology Clinic  [] Yes  [x]No   Clinic Appointment  [] Yes  [x]No   DME  [] Yes  [x]No   Comments    Name of D/C Planner or  Given to Patient or Family Janki Loving Mike Kaur RN   Phone Number         [phone]   Date March 7, 2017   Time 848 am   If you are discharged home, whom do you designate to participate in your discharge plan and receive any information needed?      Enter name of [address]  awaiting POA paperwork  Saleem ludwig            Phone # of designee Mr Pedro Melton- , [place] [phone]         Address of [place], [place]        Updated March 7, 2017        Patient refused to designate any           individual

## 2017-03-07 NOTE — PROGRESS NOTES
-0810-Received patient on ventilator ACVC+ 10, VT-500, PEEP-5, FIo2-30%. O2 sats--98% HR-69, RR-22. #7 ETT noted to be patent and secure. ABG drawn results below. VT decreased to 450ml per MD. Respiratory will continue to monitor. [facility]    Results for Maxine Ladd (MRN [id number]) as of 3/7/2017 08:21   Ref. Range 3/7/2017 08:11   pH (POC) Latest Ref Range: 7.35 - 7.45   7.289 (L)   pCO2 (POC) Latest Ref Range: 35.0 - 45.0 MMHG 30.0 (L)   pO2 (POC) Latest Ref Range: 80 - 100 MMHG 136 (H)   HCO3 (POC) Latest Ref Range: 22 - 26 MMOL/L 14.4 (L)   sO2 (POC) Latest Ref Range: 92 - 97 % 99 (H)   Base deficit (POC) Latest Units: mmol/L 12   FIO2 (POC) Latest Units: % 30   Patient temp. Latest Units:   98.8   Specimen type (POC) Latest Units:   ARTERIAL   Set Rate Latest Units: bpm 10   Site Latest Units:   RIGHT RADIAL   Device: Latest Units:   VENT   Total resp. rate Latest Units:   22   Mode Latest Units:   ASSIST CONTROL   Tidal volume Latest Units: ml 500   Volume control plus Latest Units:   YES   PEEP/CPAP (POC) Latest Units: cmH2O 5   Allens test (POC) Latest Units:   YES   Inspiratory Time Latest Units: sec 30       1545-Pt. Remains on ACVC+ 10, VT-450, PEEP-5, FIo2-30%. O2 Sats-100% HR-61, RR-17 ETT remains and secure. No additional changes this shift. -[address]

## 2017-03-07 NOTE — PROGRESS NOTES
[facility]  Pulmonary, Critical Care, and Sleep Medicine    Name: Doris Cabello MRN: [id number]   : 1935 Hospital: [facility]   Date: 3/7/2017        PCCM Progress Note    [x] I have reviewed the flowsheet and previous days notes. Events, vitals, medications and notes from last 24 hours reviewed. Care plan discussed with staff, patient, family and on multidisciplinary rounds. 3/7/2017  Started on levophed this am, currently @2  On low dose precedex due to agitation, remains unresponsive with minimal WD, moves all extremities, breathing over vent  Now DNR  Urine cx + for >100K GNR  Na/Cl rising, Cr down-UO better  Trop up, ECHO pending      IMPRESSION:   · Shock septic +/-cardiogenic requiring pressor support   · Urosepsis, Ucx +GNR  · Acute respiratory failure requiring MV, possible aspiration  · Possible cardiac arrest, CPR performed by nursing facility  · Elevated troponin  · Hypernatremia, hyperchloremia due to volume depletion  · AfibRVR, currently converted spontaneously to NSR  · Metabolic acidosis, improved  · Lactic acidosis, resolved  · REID on CKD, likely volume depletion, hypotension, improved  · AMS/acute encephalopathy, multifactorial due to above, metabolic/infectious, unknown baseline  · DM  · Hx HTN  · Hx TIA/CVA  · Hx RLS    · Code status: DNR      RECOMMENDATIONS:   · CVS: Monitor HD. Currently in NSR, was in [place]. Start amio if needed. ECHO pending. Trend cardiac enzymes. PICC to be placed. Wean levophed for MAP>65.  · Respiratory: MV bundle/protocol. Not a candidate for SBT due to AMS. Follow CXR and ABG, adjust vent prn, keep O2 sat >95%. Sputum cx. Aspiration precautions. Duonebs, abx. HOB 30 degrees  · ID:  Sepsis bundle. Urine cx +GNR >100K, await ID and sensitivities. Continue BS abx with levaquin, vanc and zosyn for now. Await blood and sputum cx. LA normalized  · Hematology/Oncology:  INR elevated slightly. Hgb and plts stable.   · Renal: Follow BUN, Cr. Trend Na, Cl. Continue IVF, free water, changed to [place]. Monitor UO, sandoval for now  · GI/:  PPI. Trend LFT's  · Endocrine:  Check TSH. Follow FSG, SSI prn  · Neurology/Pain/Sedation:  Precedex prn for comfort. Minimize sedation. CT head negative for acute process. Continue neuro checks. Consider repeat CT/MRI if no improvement  · Musculoskeletal:  Normal CK  · Skin/Wound: Local preventative wound care  · FEN: NPO for now. Replace lytes prn per protocol. IVF  · Prophylaxis: GI Prophylaxis with protonix. DVT Prophylaxis with heparin. · Restraints: B wrist  · Palliative care following  ·                                                               · Glycemic Control. Glucose stabilizer per ICU protocol when on insulin drip. Maintain blood glucose 140-180. · Replace electrolytes per ICU electrolyte replacement protocol  · Ventilator bundle & Sedation protocol followed. Daily sedation holiday, assessment for readiness for SBT and then re-titrate if required. Chlorhexidine mouth washes. · HOB 30 degree elevation all the time. Aggressive pulmonary toileting. Incentive spirometry when appropriate. Aspiration precautions. · Sepsis bundle and protocol followed. Follow serial lactic acid, frequent BMP check, fluid resuscitation. Follow cultures. Deescalate antibiotic when appropriate. · Sandoval bundle followed, remove sandoval catheter when not critically ill. · Central Line bundle followed, remove when not needed. · Skin & Wound care. · Weekly prealbumin, nutritional consult. · PT/OT eval and treat. OOB when appropriate. · Further recommendations will be based on the patient's response to recommended treatment and results of the investigation ordered. · Quality Care: PPI, DVT prophylaxis, HOB elevated, Infection control all reviewed and addressed. · Events and notes from last 24 hours reviewed.  Care plan discussed with nursing     Review of Systems:  Review of systems not obtained due to patient factors. Allergies   Allergen Reactions    Sulfa (Sulfonamide Antibiotics) Unknown (comments)    Sulfa (Sulfonamide Antibiotics) Unknown (comments)      Past Medical History:   Diagnosis Date    Chronic back pain     s/p Leminectomy and discectomy (2011)    Depression     Diabetes mellitus     Herniated disc     status post laminectomy and diskectomy surgery in January    Hyperlipidemia     Hypertension     Hypertensive heart disease     Kidney stones     Nephrocalcinosis     Other ill-defined conditions(799.89)     AFIB    PVC (premature ventricular contraction)     Restless leg syndrome     Stroke ([facility] [address] 6/7/12    TIA    TIA (transient ischemic attack)       Past Surgical History:   Procedure Laterality Date    HX LUMBAR DISKECTOMY  january 2011    LAMINECTOMY,LUMBAR      LITHOTRIPSY        Social History   Substance Use Topics    Smoking status: Never Smoker    Smokeless tobacco: Not on file    Alcohol use No      Family History   Problem Relation Age of Onset    Heart Failure Mother     Stroke Mother     Heart Failure Father       Prior to Admission medications    Medication Sig Start Date End Date Taking? Authorizing Provider   rOPINIRole (REQUIP) 0.5 mg tablet Take 0.5 mg by mouth three (3) times daily. Haider Salamanca MD   multivitamin (ONE A DAY) tablet Take 1 Tab by mouth daily. Haider Salamanca MD   potassium 99 mg tablet Take 99 mg by mouth daily. MD JOYCE Rollins MD OTHER Phys Other, MD   cyanocobalamin (VITAMIN B12) 1,000 mcg/mL injection 1 mL by IntraMUSCular route daily. 5/17/14   Joanne York MD   cyanocobalamin (VITAMIN B-12) 1,000 mcg tablet Take 1 Tab by mouth daily. 5/16/14   Joanne York MD   Aspirin, Buffered 81 mg tab Take 81 mg by mouth. Haider Salamanca MD   metoprolol (LOPRESSOR) 25 mg tablet Take 0.5 Tabs by mouth every twelve (12) hours.  4/24/14   Janet Villegas MD   diazepam (VALIUM) 5 mg tablet Take 2.5 mg by mouth two (2) times a day. Historical Provider   tamsulosin (FLOMAX) 0.4 mg capsule Take 0.4 mg by mouth daily. Historical Provider   darifenacin (ENABLEX) 7.5 mg ER tablet Take 7.5 mg by mouth daily. Historical Provider   FLUoxetine (PROZAC) 40 mg capsule Take 40 mg by mouth daily. Historical Provider   pramipexole (MIRAPEX) 1.5 mg tablet Take 1.5 mg by mouth three (3) times daily. Historical Provider   METFORMIN HCL (METFORMIN PO) Take 500 mg by mouth Before breakfast, lunch, and dinner. Historical Provider   OMEPRAZOLE (PRILOSEC PO) Take  by mouth. Historical Provider   atorvastatin (LIPITOR) 80 mg tablet Take 40 mg by mouth daily. Historical Provider   sitaGLIPtin (JANUVIA) 50 mg tablet Take 50 mg by mouth daily. Historical Provider   lisinopril (PRINIVIL, ZESTRIL) 2.5 mg tablet Take  by mouth daily.     Historical Provider     Current Facility-Administered Medications   Medication Dose Route Frequency    NOREPINephrine (LEVOPHED) 8,000 mcg in dextrose 5% 250 mL infusion  2-16 mcg/min IntraVENous TITRATE    heparin (porcine) injection 5,000 Units  5,000 Units SubCUTAneous Q12H    sodium bicarbonate (8.4%) 50 mEq in dextrose 5% 1,000 mL infusion   IntraVENous CONTINUOUS    levoFLOXacin (LEVAQUIN) 750 mg in D5W IVPB  750 mg IntraVENous Q48H    dexmedetomidine (PRECEDEX) 400 mcg in 0.9% sodium chloride 100 mL infusion  0.2-0.7 mcg/kg/hr IntraVENous TITRATE    piperacillin-tazobactam (ZOSYN) 3.375 g in 0.9% sodium chloride (MBP/ADV) 100 mL MBP  3.375 g IntraVENous Q6H    pantoprazole (PROTONIX) 40 mg in sodium chloride 0.9 % 10 mL injection  40 mg IntraVENous DAILY    chlorhexidine (PERIDEX) 0.12 % mouthwash 10 mL  10 mL Oral Q12H    vancomycin (VANCOCIN) 750 mg in 0.9% sodium chloride (MBP/ADV) 250 mL ADV  750 mg IntraVENous Q36H    [START ON 3/9/2017] VANCOMYCIN INFORMATION NOTE   Other ONCE    sodium chloride (NS) flush 5-10 mL  5-10 mL IntraVENous Q8H    insulin lispro (HUMALOG) injection SubCUTAneous Q6H       Lines: All central lines examined by me. No signs of erythema, induration, discharge.       Peripheral Intravenous Line:  Peripheral IV 17 Left Wrist (Active)   Site Assessment Clean, dry, & intact 3/6/2017  3:30 PM   Phlebitis Assessment 0 3/6/2017  3:30 PM   Infiltration Assessment 0 3/6/2017  3:30 PM   Dressing Status Clean, dry, & intact 3/6/2017  3:30 PM       Peripheral IV 17 Right Antecubital (Active)   Site Assessment Clean, dry, & intact 3/6/2017  3:34 PM   Phlebitis Assessment 0 3/6/2017  3:34 PM   Infiltration Assessment 0 3/6/2017  3:34 PM   Dressing Status Clean, dry, & intact 3/6/2017  3:34 PM       Peripheral IV 17 Left Antecubital (Active)   Site Assessment Clean, dry, & intact 3/6/2017  3:37 PM   Phlebitis Assessment 0 3/6/2017  3:37 PM   Infiltration Assessment 0 3/6/2017  3:37 PM   Dressing Status Clean, dry, & intact 3/6/2017  3:37 PM      Airway:  Airway - Endotracheal Tube 17 Oral (Active)   Insertion Depth (cm) 22 cm 3/6/2017  3:55 PM   Line Lucas Lips 3/6/2017  3:55 PM   Side Secured Centered 3/6/2017  3:55 PM   Site Assessment Clean, dry, & intact 3/6/2017  3:55 PM       Telemetry:normal sinus rhythm    Objective:   Vital Signs:    Visit Vitals    /63    Pulse 71    Temp (!) 37 °F (2.8 °C)    Resp 16    Ht 6' 2\" (1.88 m)    Wt 65.5 kg (144 lb 6.4 oz)    SpO2 97%    BMI 18.54 kg/m2       O2 Device: Ventilator       Temp (24hrs), Av.1 °F (27.3 °C), Min:34.5 °F (1.4 °C), Max:99.9 °F (37.7 °C)       Intake/Output:   Last shift:      701 - 1900  In: 600   Out: 300 [Urine:300]    Last 3 shifts: 1901 -  07  In: 2545.3 [I.V.:2545.3]  Out: 705 [Urine:705]      Intake/Output Summary (Last 24 hours) at 17 1202  Last data filed at 17 1100   Gross per 24 hour   Intake          3145.28 ml   Output             1005 ml   Net          2140.28 ml       Last 3 Recorded Weights in this Encounter    17 1508 03/06/17 2100   Weight: 61.2 kg (135 lb) 65.5 kg (144 lb 6.4 oz)       Ventilator Settings:  Mode Rate Tidal Volume Pressure FiO2 PEEP   Assist control, VC+   450 ml    30 % 5 cm H20     Peak airway pressure: 11 cm H2O    Plateau pressure:     Tidal volume:    Minute ventilation: 9.18 l/min   SPO2      ARDS network Guidelines: Lung protective strategy and Plateau pressure goals less than or equal to 30      Physical Exam:     General/Neurology: Intubated, on precedex, moving all extremities, some slight tremor at times, PERRL, R slightly more sluggish, not FC, appears older than stated age  Head:   Temporal wasting  Eye:   No scleral icterus  Nose:   No erythema  Throat:  ETT in place, mm appear dry  Neck:   Supple  Lung:   Adequate air entry bilateral equal, no wheeze/rhonchi  Heart:   Regular, no m  Abdomen:  Soft, ND, +BS, no masses, no organomegaly  :  Smith in place   Extremities:  No pedal edema, no cyanosis   Musculoskeletal: No joint swelling or tenderness.  BUE somewhat contracted  Skin:   Warm and dry      Data:       Recent Results (from the past 24 hour(s))   HEMOGLOBIN A1C WITH EAG    Collection Time: 03/06/17  3:15 PM   Result Value Ref Range    Hemoglobin A1c 6.2 (H) 4.2 - 5.6 %    Est. average glucose 131 mg/dL   POC TROPONIN-I    Collection Time: 03/06/17  3:16 PM   Result Value Ref Range    Troponin-I (POC) 0.04 0.00 - 0.08 ng/mL   POC CHEM8    Collection Time: 03/06/17  3:17 PM   Result Value Ref Range    CO2 (POC) 19 19 - 24 MMOL/L    Glucose (POC) 161 (H) 74 - 106 MG/DL    BUN (POC) 84 (H) 7 - 18 MG/DL    Creatinine (POC) 3.0 (H) 0.6 - 1.3 MG/DL    GFR-AA (POC) 25 (L) >60 ml/min/1.73m2    GFR, non-AA (POC) 20 (L) >60 ml/min/1.73m2    Sodium (POC) 158 (H) 136 - 145 MMOL/L    Potassium (POC) 4.3 3.5 - 5.5 MMOL/L    Calcium, ionized (POC) 1.15 1.12 - 1.32 MMOL/L    Chloride (POC) 126 (H) 100 - 108 MMOL/L    Anion gap (POC) 18 10 - 20      Hematocrit (POC) 42 36 - 49 %    Hemoglobin (POC) 14.3 12 - 16 G/DL   POC LACTIC ACID    Collection Time: 03/06/17  3:18 PM   Result Value Ref Range    Lactic Acid (POC) 4.0 (HH) 0.4 - 2.0 mmol/L   EKG, 12 LEAD, INITIAL    Collection Time: 03/06/17  3:19 PM   Result Value Ref Range    Ventricular Rate 162 BPM    Atrial Rate 166 BPM    P-R Interval 130 ms    QRS Duration 106 ms    Q-T Interval 250 ms    QTC Calculation (Bezet) 410 ms    Calculated P Axis 58 degrees    Calculated R Axis -38 degrees    Calculated T Axis -160 degrees    Diagnosis       Sinus tachycardia  Left axis deviation  Marked ST abnormality, possible inferior subendocardial injury  Abnormal ECG  When compared with ECG of 24-DEC-2016 16:10,  Significant changes have occurred     METABOLIC PANEL, COMPREHENSIVE    Collection Time: 03/06/17  3:20 PM   Result Value Ref Range    Sodium 156 (H) 136 - 145 mmol/L    Potassium 4.4 3.5 - 5.5 mmol/L    Chloride 125 (H) 100 - 108 mmol/L    CO2 17 (L) 21 - 32 mmol/L    Anion gap 14 3.0 - 18 mmol/L    Glucose 157 (H) 74 - 99 mg/dL    BUN 87 (H) 7.0 - 18 MG/DL    Creatinine 3.24 (H) 0.6 - 1.3 MG/DL    BUN/Creatinine ratio 27 (H) 12 - 20      GFR est AA 23 (L) >60 ml/min/1.73m2    GFR est non-AA 19 (L) >60 ml/min/1.73m2    Calcium 9.8 8.5 - 10.1 MG/DL    Bilirubin, total 0.8 0.2 - 1.0 MG/DL    ALT (SGPT) 51 16 - 61 U/L    AST (SGOT) 43 (H) 15 - 37 U/L    Alk.  phosphatase 103 45 - 117 U/L    Protein, total 7.0 6.4 - 8.2 g/dL    Albumin 3.1 (L) 3.4 - 5.0 g/dL    Globulin 3.9 2.0 - 4.0 g/dL    A-G Ratio 0.8 0.8 - 1.7     CBC WITH AUTOMATED DIFF    Collection Time: 03/06/17  3:20 PM   Result Value Ref Range    WBC 10.0 4.6 - 13.2 K/uL    RBC 4.26 (L) 4.70 - 5.50 M/uL    HGB 14.0 13.0 - 16.0 g/dL    HCT 43.3 36.0 - 48.0 %    .6 (H) 74.0 - 97.0 FL    MCH 32.9 24.0 - 34.0 PG    MCHC 32.3 31.0 - 37.0 g/dL    RDW 13.7 11.6 - 14.5 %    PLATELET 879 933 - 048 K/uL    MPV 11.4 9.2 - 11.8 FL    NEUTROPHILS 80 (H) 40 - 73 %    LYMPHOCYTES 14 (L) 21 - 52 %    MONOCYTES 6 3 - 10 %    EOSINOPHILS 0 0 - 5 %    BASOPHILS 0 0 - 2 %    ABS. NEUTROPHILS 8.0 1.8 - 8.0 K/UL    ABS. LYMPHOCYTES 1.4 0.9 - 3.6 K/UL    ABS. MONOCYTES 0.6 0.05 - 1.2 K/UL    ABS. EOSINOPHILS 0.0 0.0 - 0.4 K/UL    ABS.  BASOPHILS 0.0 0.0 - 0.06 K/UL    DF AUTOMATED     TSH 3RD GENERATION    Collection Time: 03/06/17  3:20 PM   Result Value Ref Range    TSH 3.08 0.36 - 3.74 uIU/mL   T4, FREE    Collection Time: 03/06/17  3:20 PM   Result Value Ref Range    T4, Free 1.0 0.7 - 1.5 NG/DL   MAGNESIUM    Collection Time: 03/06/17  3:20 PM   Result Value Ref Range    Magnesium 3.0 (H) 1.8 - 2.4 mg/dL   TYPE & SCREEN    Collection Time: 03/06/17  3:22 PM   Result Value Ref Range    Crossmatch Expiration 03/09/2017     ABO/Rh(D) B POSITIVE     Antibody screen NEG    URINALYSIS W/ RFLX MICROSCOPIC    Collection Time: 03/06/17  3:38 PM   Result Value Ref Range    Color DARK YELLOW      Appearance TURBID      Specific gravity 1.020 1.005 - 1.030      pH (UA) >8.5 (H) 5.0 - 8.0    Protein 300 (A) NEG mg/dL    Glucose NEGATIVE  NEG mg/dL    Ketone TRACE (A) NEG mg/dL    Bilirubin NEGATIVE  NEG      Blood LARGE (A) NEG      Urobilinogen 1.0 0.2 - 1.0 EU/dL    Nitrites NEGATIVE  NEG      Leukocyte Esterase LARGE (A) NEG     CULTURE, URINE    Collection Time: 03/06/17  3:38 PM   Result Value Ref Range    Special Requests: NO SPECIAL REQUESTS      Culture result: >100,000  COLONIES/mL  GRAM NEGATIVE RODS   (A)     URINE MICROSCOPIC ONLY    Collection Time: 03/06/17  3:38 PM   Result Value Ref Range    WBC TOO NUMEROUS TO COUNT 0 - 4 /hpf    RBC TOO NUMEROUS TO COUNT 0 - 5 /hpf    Epithelial cells FEW 0 - 5 /lpf    Bacteria 4+ (A) NEG /hpf   POC G3    Collection Time: 03/06/17  4:53 PM   Result Value Ref Range    Device: VENT      FIO2 (POC) 60 %    pH (POC) 7.259 (L) 7.35 - 7.45      pCO2 (POC) 36.1 35.0 - 45.0 MMHG    pO2 (POC) 345 (H) 80 - 100 MMHG    HCO3 (POC) 16.4 (L) 22 - 26 MMOL/L    sO2 (POC) 100 (H) 92 - 97 %    Base deficit (POC) 11 mmol/L    Mode ASSIST CONTROL      Tidal volume 400 ml    Set Rate 10 bpm    PEEP/CPAP (POC) 5 cmH2O    Allens test (POC) YES      Total resp.  rate 18      Site RIGHT RADIAL      Patient temp. 96.8      Specimen type (POC) ARTERIAL      Performed by Dang Ross     Volume control plus YES     EKG, 12 LEAD, SUBSEQUENT    Collection Time: 03/06/17  5:55 PM   Result Value Ref Range    Ventricular Rate 65 BPM    Atrial Rate 65 BPM    P-R Interval 170 ms    QRS Duration 110 ms    Q-T Interval 498 ms    QTC Calculation (Bezet) 517 ms    Calculated P Axis 87 degrees    Calculated R Axis -2 degrees    Calculated T Axis 74 degrees    Diagnosis       Normal sinus rhythm  Septal infarct , age undetermined  Prolonged QT  Abnormal ECG  When compared with ECG of 06-MAR-2017 15:19,  Significant changes have occurred     AMMONIA    Collection Time: 03/06/17  6:05 PM   Result Value Ref Range    Ammonia 34 (H) 11 - 32 UMOL/L   POC LACTIC ACID    Collection Time: 03/06/17  7:34 PM   Result Value Ref Range    Lactic Acid (POC) 1.0 0.4 - 2.0 mmol/L   GLUCOSE, POC    Collection Time: 03/06/17 11:53 PM   Result Value Ref Range    Glucose (POC) 138 (H) 70 - 110 mg/dL   CBC W/O DIFF    Collection Time: 03/07/17  3:35 AM   Result Value Ref Range    WBC 9.4 4.6 - 13.2 K/uL    RBC 3.81 (L) 4.70 - 5.50 M/uL    HGB 12.2 (L) 13.0 - 16.0 g/dL    HCT 38.7 36.0 - 48.0 %    .6 (H) 74.0 - 97.0 FL    MCH 32.0 24.0 - 34.0 PG    MCHC 31.5 31.0 - 37.0 g/dL    RDW 13.9 11.6 - 14.5 %    PLATELET 999 942 - 457 K/uL    MPV 11.3 9.2 - 71.2 FL   METABOLIC PANEL, BASIC    Collection Time: 03/07/17  3:35 AM   Result Value Ref Range    Sodium 161 (HH) 136 - 145 mmol/L    Potassium 3.8 3.5 - 5.5 mmol/L    Chloride 133 (H) 100 - 108 mmol/L    CO2 19 (L) 21 - 32 mmol/L    Anion gap 9 3.0 - 18 mmol/L    Glucose 121 (H) 74 - 99 mg/dL    BUN 81 (H) 7.0 - 18 MG/DL    Creatinine 2.59 (H) 0.6 - 1.3 MG/DL    BUN/Creatinine ratio 31 (H) 12 - 20      GFR est AA 29 (L) >60 ml/min/1.73m2    GFR est non-AA 24 (L) >60 ml/min/1.73m2    Calcium 7.9 (L) 8.5 - 10.1 MG/DL   TROPONIN I    Collection Time: 03/07/17  3:35 AM   Result Value Ref Range    Troponin-I, Qt. 1.21 (H) 0.0 - 0.045 NG/ML   GLUCOSE, POC    Collection Time: 03/07/17  5:32 AM   Result Value Ref Range    Glucose (POC) 90 70 - 110 mg/dL   SODIUM    Collection Time: 03/07/17  7:00 AM   Result Value Ref Range    Sodium 162 (HH) 136 - 145 mmol/L   CARDIAC PANEL,(CK, CKMB & TROPONIN)    Collection Time: 03/07/17  7:00 AM   Result Value Ref Range     39 - 308 U/L    CK - MB 11.8 (H) <3.6 ng/ml    CK-MB Index 4.6 (H) 0.0 - 4.0 %    Troponin-I, Qt. 1.39 (H) 0.0 - 0.045 NG/ML   POC G3    Collection Time: 03/07/17  8:11 AM   Result Value Ref Range    Device: VENT      FIO2 (POC) 30 %    pH (POC) 7.289 (L) 7.35 - 7.45      pCO2 (POC) 30.0 (L) 35.0 - 45.0 MMHG    pO2 (POC) 136 (H) 80 - 100 MMHG    HCO3 (POC) 14.4 (L) 22 - 26 MMOL/L    sO2 (POC) 99 (H) 92 - 97 %    Base deficit (POC) 12 mmol/L    Mode ASSIST CONTROL      Tidal volume 500 ml    Set Rate 10 bpm    PEEP/CPAP (POC) 5 cmH2O    Allens test (POC) YES      Inspiratory Time 30 sec    Total resp.  rate 22      Site RIGHT RADIAL      Patient temp. 98.8      Specimen type (POC) ARTERIAL      Performed by Rashaad Jones     Volume control plus YES     PROTHROMBIN TIME + INR    Collection Time: 03/07/17 10:15 AM   Result Value Ref Range    Prothrombin time 16.9 (H) 11.5 - 15.2 sec    INR 1.4 (H) 0.8 - 1.2           Chemistry Recent Labs      03/07/17   0700  03/07/17   0335  03/06/17   1520   GLU   --   121*  157*   NA  162*  161*  156*   K   --   3.8  4.4   CL   --   133*  125*   CO2   --   19*  17*   BUN   --   81*  87*   CREA   --   2.59*  3.24*   CA   --   7.9*  9.8   MG   --    --   3.0*   AGAP   --   9  14   BUCR   --   31*  27*   AP   --    --   103   TP   --    --   7.0   ALB   --    --   3.1*   GLOB   --    --   3.9   AGRAT   --    --   0.8        Lactic Acid No results found for: LAC  No results for input(s): LAC in the last 72 hours. Liver Enzymes Protein, total   Date Value Ref Range Status   03/06/2017 7.0 6.4 - 8.2 g/dL Final     Albumin   Date Value Ref Range Status   03/06/2017 3.1 (L) 3.4 - 5.0 g/dL Final     Globulin   Date Value Ref Range Status   03/06/2017 3.9 2.0 - 4.0 g/dL Final     A-G Ratio   Date Value Ref Range Status   03/06/2017 0.8 0.8 - 1.7   Final     AST (SGOT)   Date Value Ref Range Status   03/06/2017 43 (H) 15 - 37 U/L Final     Alk.  phosphatase   Date Value Ref Range Status   03/06/2017 103 45 - 117 U/L Final     Recent Labs      03/06/17   1520   TP  7.0   ALB  3.1*   GLOB  3.9   AGRAT  0.8   SGOT  43*   AP  103        CBC w/Diff Recent Labs      03/07/17   0335  03/06/17   1520   WBC  9.4  10.0   RBC  3.81*  4.26*   HGB  12.2*  14.0   HCT  38.7  43.3   PLT  177  232   GRANS   --   80*   LYMPH   --   14*   EOS   --   0        Cardiac Enzymes Lab Results   Component Value Date/Time     03/07/2017 07:00 AM    CKMB 11.8 (H) 03/07/2017 07:00 AM    CKND1 4.6 (H) 03/07/2017 07:00 AM    TROIQ 1.39 (H) 03/07/2017 07:00 AM    TROIQ 1.21 (H) 03/07/2017 03:35 AM    TNIPOC 0.04 03/06/2017 03:16 PM        BNP No results found for: BNP, BNPP, XBNPT     Coagulation Recent Labs      03/07/17   1015   PTP  16.9*   INR  1.4*         Thyroid  Lab Results   Component Value Date/Time    T4, Total 7.8 04/22/2014 08:25 AM    TSH 3.08 03/06/2017 03:20 PM       Lab Results   Component Value Date/Time    T4, Total 7.8 04/22/2014 08:25 AM        Lipid Panel Lab Results   Component Value Date/Time    Cholesterol, total 135 06/08/2012 05:25 AM    HDL Cholesterol 34 06/08/2012 05:25 AM    LDL, calculated 80.8 06/08/2012 05:25 AM    VLDL, calculated 20.2 06/08/2012 05:25 AM    Triglyceride 101 06/08/2012 05:25 AM    CHOL/HDL Ratio 4.0 06/08/2012 05:25 AM        ABG Recent Labs      03/07/17   0811  03/06/17   1653   PHI  7.289*  7.259*   PCO2I  30.0*  36.1 PO2I  136*  345*   HCO3I  14.4*  16.4*   FIO2I  30  60        Urinalysis Lab Results   Component Value Date/Time    Color DARK YELLOW 03/06/2017 03:38 PM    Appearance TURBID 03/06/2017 03:38 PM    Specific gravity 1.020 03/06/2017 03:38 PM    pH (UA) >8.5 03/06/2017 03:38 PM    Protein 300 03/06/2017 03:38 PM    Glucose NEGATIVE  03/06/2017 03:38 PM    Ketone TRACE 03/06/2017 03:38 PM    Bilirubin NEGATIVE  03/06/2017 03:38 PM    Urobilinogen 1.0 03/06/2017 03:38 PM    Nitrites NEGATIVE  03/06/2017 03:38 PM    Leukocyte Esterase LARGE 03/06/2017 03:38 PM    Epithelial cells FEW 03/06/2017 03:38 PM    Bacteria 4+ 03/06/2017 03:38 PM    WBC TOO NUMEROUS TO COUNT 03/06/2017 03:38 PM    RBC TOO NUMEROUS TO COUNT 03/06/2017 03:38 PM        Micro  Recent Labs      03/06/17   1538   CULT  >100,000  COLONIES/mL  GRAM NEGATIVE RODS  *     Recent Labs      03/06/17   1538   CULT  >100,000  COLONIES/mL  GRAM NEGATIVE RODS  *        EKG  No results found for this or any previous visit. PFT  No flowsheet data found. Other  ASA reactivity:   Pre-albumin:   Ionized Calcium:   NH4:   T3, FT4:  Cortisol:  Urine Osm:  Urine Lytes:   HbA1c:      Ultrasound       LE Doppler       ECHO  No results found for this or any previous visit. CT (Most Recent)    Results from Hospital Encounter encounter on 03/06/17   CT HEAD WO CONT   Narrative EXAM: CT head    INDICATION: Altered mental status    COMPARISON: Several prior exams, most recently 12/24/2016    TECHNIQUE: Axial CT imaging of the head was performed without intravenous  contrast.    One or more dose reduction techniques were used on this CT: automated exposure  control, adjustment of the mAs and/or kVp according to patient's size, and  iterative reconstruction techniques.  The specific techniques utilized on this CT  exam have been documented in the patient's electronic medical record.     _______________    FINDINGS: Examination detail is mildly limited by motion artifact. BRAIN AND POSTERIOR FOSSA: There is mild cortical and cerebellar volume loss  present, the appearance of which is similar to prior examinations. There is a  stable appearance to the ventricular size and configuration. The basilar  cisterns are patent. Mild subcortical and periventricular white matter  hypoattenuation is unchanged. Physiologic bilateral basal ganglia calcifications  are present. There is no intracranial hemorrhage, mass effect, or midline shift. There are no areas of abnormal parenchymal attenuation. EXTRA-AXIAL SPACES AND MENINGES: There are no abnormal extra-axial fluid  collections. CALVARIUM: Intact. SINUSES: There is mild mucosal thickening of the anterior and posterior ethmoid  air cells. OTHER: Atherosclerotic calcification of bilateral carotid siphons noted. _______________         Impression IMPRESSION:      1. Mildly motion limited examination, without evidence of acute intracranial  abnormality. Of note, noncontrast head CT can be normal in the context of early  acute stroke. 2. Unchanged subcortical and periventricular white matter hypoattenuation, a  nonspecific finding likely pertaining to chronic ischemic microvascular change. 3. Minimal, nonspecific mucosal thickening of the anterior and posterior ethmoid  air cells. XR (Most Recent). CXR  reviewed by me and compared with previous CXR   Results from Hospital Encounter encounter on 03/06/17   XR CHEST PORT   Narrative Chest, single view    Indication: Endotracheal tube placement    Comparison: Several prior exams, most recently March 6, 2017    Findings:  Portable upright AP view of the chest was obtained. There is an  endotracheal tube present 3.9 cm above the sumit. An esophagogastric tube is  present below the diaphragm, the tip collimated from view. Minimal linear  opacities are noted at each lung base. No focal pneumonic consolidation,  pneumothorax, or pleural effusion.  Cardiac size and thymic contours are stable,  with redemonstration of a tortuous thoracic aorta. No acute osseous abnormality. Impression Impression:    1. Endotracheal tube and visualized esophagogastric tubes in position as above. 2. Mild pulmonary hypoinflation with linear areas of atelectasis at each lung  base. CXR 3/7/17    1. Endotracheal tube and visualized esophagogastric tubes in position as above. 2. Mild pulmonary hypoinflation with linear areas of atelectasis at each lung  base    Best practice : All below core measures reviewed and addressed:   [x] Antibiotic choice. [x] Severe Sepsis bundles follwed; SIRS screen met? Yes  [x] Fluids. [x] Lactic acid ordered- initial and repeat Q6hrs if elevated till normalized. [x] Cultures drawn. [x] Antibiotic administered within 1hr-ICU and 3hrs ED. [x] Large bore IV- 2 sites          [x] Pressors aim MAP >65mmHg. [x] Steroids. [x] Glycemic control. [x] Infection control. [x] Mech. Ventilated patients- aim to keep peak plateau pressure 86-84HK H2O. [x] HOB at >= 30 degree. [x] Stress ulcer prophylaxis. [x] DVT prophylaxis. [x] Central Line Bundle Followed. [x] Smith Bundle Followed. [x] Ventilator Bundle Followed. (Daily sedation holiday to assess readiness for weaning from ventilator & SBT trial starting at 6.00 am, HOB 30-degree elevation, Chlorhexidine mouth washes & Routine oral care every 4 hours, Ingham tube to suction at 20-30 cm H2O, Maintain Bernadine tube with 5-10ml air every 4 hours, DVT prophylaxis, GI prophylaxis etc.).     The patient is: [] acutely ill Risk of deterioration: [] moderate    [x] critically ill  [x] high     [x]See my orders for details    My assessment, plan of care, findings, medications, side effects etc were discussed with:  [x] Nurse [] PT/OT    [x] Respiratory therapy [x] Dr. Asuncion Saucedo   [] Pharmacist [x] MDR   [] Family: answered all questions to satisfaction [] Patient: answered all questions to satisfaction   [x] [x]      [x] Case & management strategies discussed today on multidisciplinary rounds    High complexity decision making was performed during the evaluation of this patient at high risk for decompensation with multiple organ involvement    [x]Total time spent on reviewing the case/medical record/data/notes/EMR/patient examination/documentation/coordinating care with nurse/consultants, exclusive of procedures with complex decision making performed >35 minutes and > 50% time spent in face to face evaluation, as mentioned above.       Marcin Rodriguez  3/7/2017

## 2017-03-07 NOTE — ED NOTES
SEPSIS SUMMARY    · Blood Cultures Drawn: YES  · Time Initial Lactic Drawn: 15:20            · POC Lactic Acid Result: 4.1  · Time Next Lactic Acid Due: 17:20  · Repeat POC Lactic Acid drawn:  YES   · POC Lactic Acid Repeat Result: 0.98  · Antibiotics started within 3 hours of arrival? YES  · Target fluid bolus 30ml/kg initiated?  YES    Vital signs:  Patient Vitals for the past 4 hrs:   Temp Pulse Resp BP SpO2   03/06/17 1935 - (!) 59 - 138/90 97 %   03/06/17 1934 - (!) 58 - - 96 %   03/06/17 1933 95.7 °F (35.4 °C) (!) 58 - - 99 %   03/06/17 1930 - (!) 58 - 139/89 99 %   03/06/17 1925 95.2 °F (35.1 °C) (!) 59 16 139/86 100 %   03/06/17 1920 - (!) 58 16 137/89 99 %   03/06/17 1915 (!) 94.5 °F (34.7 °C) 60 15 (!) 138/92 99 %   03/06/17 1910 (!) 94.5 °F (34.7 °C) 60 15 (!) 144/92 99 %   03/06/17 1905 (!) 94.6 °F (34.8 °C) (!) 59 16 (!) 146/94 99 %   03/06/17 1900 (!) 94.6 °F (34.8 °C) 60 17 (!) 149/92 98 %   03/06/17 1855 (!) 94.6 °F (34.8 °C) 60 16 149/87 99 %   03/06/17 1850 (!) 94.8 °F (34.9 °C) 60 16 147/89 99 %   03/06/17 1845 (!) 94.8 °F (34.9 °C) 61 15 (!) 145/93 98 %   03/06/17 1840 (!) 34.9 °F (1.6 °C) 61 16 152/88 98 %   03/06/17 1835 95 °F (35 °C) 62 17 (!) 149/91 97 %   03/06/17 1830 95 °F (35 °C) 61 17 (!) 148/95 96 %   03/06/17 1825 95.2 °F (35.1 °C) 61 17 (!) 147/93 96 %   03/06/17 1820 95.2 °F (35.1 °C) 62 18 (!) 152/95 98 %   03/06/17 1815 95.2 °F (35.1 °C) 62 17 (!) 144/96 97 %   03/06/17 1810 95.4 °F (35.2 °C) 63 16 (!) 152/94 96 %   03/06/17 1805 95.4 °F (35.2 °C) 64 15 156/90 97 %   03/06/17 1800 95.5 °F (35.3 °C) 66 16 150/90 97 %   03/06/17 1755 95.5 °F (35.3 °C) 66 16 151/82 97 %   03/06/17 1750 95.7 °F (35.4 °C) 68 16 (!) 152/97 97 %   03/06/17 1745 95.7 °F (35.4 °C) 66 17 152/90 97 %   03/06/17 1740 95.9 °F (35.5 °C) 67 18 152/78 97 %   03/06/17 1735 95.9 °F (35.5 °C) 68 16 157/86 97 %   03/06/17 1730 95.9 °F (35.5 °C) (!) 103 18 (!) 121/91 98 %   03/06/17 1725 95.9 °F (35.5 °C) (!) 114 17 115/75 97 %   03/06/17 1720 99.7 °F (37.6 °C) (!) 118 18 100/75 98 %   03/06/17 1715 95.9 °F (35.5 °C) (!) 124 15 105/77 97 %   03/06/17 1710 95.9 °F (35.5 °C) (!) 117 17 (!) 114/97 97 %   03/06/17 1700 - (!) 122 24 110/65 98 %   03/06/17 1647 96.1 °F (35.6 °C) (!) 125 20 - 100 %   03/06/17 1645 - - - 115/69 -   03/06/17 1617 - (!) 127 - 114/89 94 %   03/06/17 1610 96.8 °F (36 °C) (!) 136 - 102/42 -   03/06/17 1605 - (!) 134 - 129/88 97 %   03/06/17 1600 - (!) 137 - (!) 149/95 99 %   03/06/17 1555 - (!) 137 19 - 99 %   03/06/17 1553 - (!) 134 - 146/78 93 %       MAP (Monitor): 101    =monitored (data validate)  MAP (Calculated): (!) 48    =calculated (manual entry)      Additional Labs:    WBC:    Lab Results   Component Value Date/Time    WBC 10.0 03/06/2017 03:20 PM     Bilirubin:    Lab Results   Component Value Date/Time    Bilirubin, total 0.8 03/06/2017 03:20 PM    Bilirubin, direct 0.2 03/06/2016 08:30 AM    Bilirubin NEGATIVE  03/06/2017 03:38 PM     INR:  Lab Results   Component Value Date/Time    INR 1.0 05/13/2014 08:32 AM    INR 1.0 04/21/2014 01:55 PM    INR 1.1 06/08/2012 05:25 AM    Prothrombin time 13.5 05/13/2014 08:32 AM    Prothrombin time 13.0 04/21/2014 01:55 PM    Prothrombin time 13.8 06/08/2012 05:25 AM     Creatinine:  Lab Results   Component Value Date/Time    Creatinine (POC) 3.0 03/06/2017 03:17 PM    Creatinine 3.24 03/06/2017 03:20 PM     Platelet Count:    Lab Results   Component Value Date/Time    PLATELET 013 26/42/0527 03:20 PM         SEPSIS CRITERIA MET? YES    Sepsis=2 or more SIRS criteria + infection  SIRS Criteria:   Temperature < 96.8°F (36°C) or > 100.9°F (38.3°C)    Heart Rate > 90 beats per minute    Respiratory Rate > 20 beats per minute    WBC count > 12,000 or <4,000 or > 10% bands     SEVERE SEPSIS CRITERIA MET? YES   SIRS criteria Met? YES   Documented source of infection? YES   Evidence of Organ Dysfunction?  YES    Severe Sepsis = SIRS Criteria + Source of Infection + Organ Dysfunction  Organ Dysfunction is met when the patient has new onset any of the following:   SBP<90 or MAP<65 or decrease in SBP more than 40 points from baseline    Bilirubin>2    INR>1.5 or aPTT>60    Creatinine>2 or UO<0.5 ml/kg/hr for 2 hours    Platelet count < 062,248    Lactate >2    Acute Respiratory Failure (BiPap/CPAP/Ventilator)    SEPTIC SHOCK CRITERIA MET? YES   Severe Sepsis criteria met? YES   Initial Lactic Acid > 4? YES   Persistent hypotension after 30ml/kg fluid bolus completion?  NO    Septic Shock = Severe Sepsis + Any of the following   Initial Lactate > 4    Persistent hypotension defined as 2 consecutive systolic BP's less than 90 and/or MAP less 65 within the first hour after fluid bolus completion

## 2017-03-07 NOTE — PROGRESS NOTES
attempted to conduct an initial consultation and spiritual assessment for Tiffanie Alcala, who is a 80 y.o.,male. However, patient was on ventilator support. No family was at bedside at time of visit. Patients primary language is: [place]. According to the patients EMR, his Orthodoxy affiliation is: No Roman Catholic. The  provided the following interventions:  Initiated a relationship of care and support, leaving family a departmental brochure and a card with a personal note. Provided information about Spiritual Care Services. Offered silent prayer and, in the note, assurance of continued prayers on patient's behalf as well as our availability to them. Chart reviewed. Plan:  Chaplains will continue to follow and will provide pastoral care on an as-needed/requested basis.  recommends bedside caregivers page  on duty if patient's family shows signs of acute spiritual or emotional distress.     [facility]  Board Certified [address] [place] [address]    [phone]

## 2017-03-07 NOTE — PALLIATIVE CARE
I spoke to pt's 2nd MPOA, Mr Lillie Shah. He reports that pt's ex wife Augustina Kaur is very ill in [place], and unable to have a discussion about goals of care. Mr Kiel Welsh lives in [address], [place] and will be here this afternoon. He will call me when he arrives. Mr Kiel Welsh reported that pt would not want resuscitation and he gave permission to change pt's code status to Do Not Resuscitate. He will sign DDNR when he arrives.

## 2017-03-07 NOTE — PROGRESS NOTES
[facility]: [phone] ([phone])  [facility] G[facility]: [phone]   [facility]: [phone]    Patient Name: Radha Bernard  YOB: 1935    Date of Initial Consult: 3/7/2017  Reason for Consult: care decisions  Requesting Provider: JOELLE Self   Primary Care Physician: Imelda Ortiz MD      SUMMARY:   Radha Bernard is a 80y.o. year old with a past history of chronic back pain, depression, diabetes, hypertension, CVA, a fib, who was admitted on 3/6/2017 from [facility] with a diagnosis of shock, possible septic and cardiogenic . Current medical issues leading to Palliative Medicine involvement include: 80year old nursing home resident who was found unresponsive by NH staff, thought to be in cardiac arrest, 1 minute of CPR. Intubated in the field by EMS, in the ER found to be in septic shock, likely due to UTI. Transferred to ICU on ventilator, poor responsive, requiring pressor support. Palliative medicine is consulted to discuss care decisions. PALLIATIVE DIAGNOSES:   1. Acute respiratory failure  2. Shock septic   3. Acute encephalopathy  4. Debility        PLAN:   1. Patient seen at bedside, orally intubated on ventilator. Discussed via phone with care givers at [facility]. Patient at baseline, alert oriented x1, requires assistance with all ADL's. Will stand with max assist, but otherwise w/c, bed bound. Incontinent of bowel and bladder. He is able to feed himself with assistance. 2. Goals of care; Patient has executed an AMD which is on file, first MPOA is Devendra Pearce his ex wife, who is reported to be in a hospital in [facility], very ill herself. Alternate MPOA is Veronica Mirza who wishes to honor Mr. Janice Parada wishes for no heroics such as chest compressions or shock in the event of cardiac arrest. No replacement of ET if becomes accidentally dislodged. Patient is a DNR. MPOA Moe  Marco [place], was at the bedside for a short time, but he were not notified and he did not call per our request to meet with him. We will discuss further care decisions with him tomorrow. Call was made to his home, but he was not available. 3. Shock , septic on IVAB, pressor support REID, improving with hydration   4. Acute encephalopathy; off sedation but does not follow commands. At base line per nursing home oriented to himself. 5. Initial consult note routed to primary continuity provider  6. Communicated plan of care with: Palliative IDT, ICU team, [facility] Mr. Cynthia Sanchez:   [====Goals of Care====]  GOALS OF CARE:  Patient / health care proxy stated goals:       TREATMENT PREFERENCES:   Code Status: DNR    Advance Care Planning:  No flowsheet data found. Other:    The palliative care team has discussed with patient / health care proxy about goals of care / treatment preferences for patient.  [====Goals of Care====]      No flowsheet data found. HISTORY:     History obtained from: chart     CHIEF COMPLAINT: shock     HPI/SUBJECTIVE:    The patient is:   [] Verbal and participatory  [x] Non-participatory due to: oral intubation and poorly responsive   Please see summary   36.6 54 107/58 vent supported    Creat 2.59 ammonia 34, alb 3.1   Urine c/s + over 100,000 colonies of gram negative rods  Bld c/s NGTD   3/6/2017 CT of head Mildly motion limited examination, without evidence of acute intracranial abnormality. Of note, noncontrast head CT can be normal in the context of early acute stroke. 2. Unchanged subcortical and periventricular white matter hypoattenuation, a nonspecific finding likely pertaining to chronic ischemic microvascular change. 3. Minimal, nonspecific mucosal thickening of the anterior and posterior ethmoid air cells. ECHO 3/7/2017  Left ventricle: Systolic function was mildly reduced by visual assessment. Ejection fraction was estimated to be 50 %.  There was possible hypokinesis of anterior wall. Doppler parameters were consistent with abnormal left  ventricular relaxation (grade 1 diastolic dysfunction). Right ventricle: Systolic function was normal.  Aortic valve: There was no stenosis. Tricuspid valve: There was mild regurgitation. Inferior vena cava, hepatic veins: The inferior vena cava was dilated. Unable to evaluate inspiratory variation due to intubation. Clinical Pain Assessment (nonverbal scale for nonverbal patients): Pain: 0  Adult Non-Verbal Pain Assessment    Face  [x] 0   No particular expression or smile  [] 1   Occasional grimace, tearing, frowning, wrinkled forehead  [] 2   Frequent grimace, tearing, frowning, wrinkled forehead    Activity (movement)  [x] 0   Lying quietly, normal position  [] 1   Seeking attention through movement or slow, cautious movement  [] 2   Restless, excessive activity and/or withdrawal reflexes    Guarding  [x] 0   Lying quietly, no positioning of hands over areas of body  [] 1   Splinting areas of the body, tense  [] 2   Rigid, stiff    Physiology (vital signs)  [x] 0   Stable vital signs  [] 1   Change in any of the following: SBP > 20mm Hg; HR > 20/minute  [] 2   Change in any of the following: SBP > 30mm Hg; HR > 25/minute    Respiratory  [x] 0   Baseline RR/SpO2, compliant with ventilator  [] 1   RR > 10 above baseline, or 5% drop SpO2, mild asynchrony with ventilator  [] 2   RR > 20 above baseline, or 10% drop SpO2, asynchrony with ventilator    Total Non-Verbal Pain Score: 0       FUNCTIONAL ASSESSMENT:     Palliative Performance Scale (PPS): 20%          PSYCHOSOCIAL/SPIRITUAL SCREENING:     Advance Care Planning:  No flowsheet data found.      Any spiritual / Judaism concerns:  [] Yes /  [] No    Caregiver Burnout:  [] Yes /  [] No /  [x] No Caregiver Present      Anticipatory grief assessment:   [] Normal  / [] Maladaptive       ESAS Anxiety:      ESAS Depression:          REVIEW OF SYSTEMS:     Positive and pertinent negative findings in ROS are noted above in HPI. The following systems were [] reviewed / [x] unable to be reviewed as noted in HPI  Other findings are noted below. Systems: constitutional, ears/nose/mouth/throat, respiratory, gastrointestinal, genitourinary, musculoskeletal, integumentary, neurologic, psychiatric, endocrine. Positive findings noted below. Modified ESAS Completed by: provider           Pain: 0           Dyspnea: 0                    PHYSICAL EXAM:     Wt Readings from Last 3 Encounters:   03/06/17 65.5 kg (144 lb 6.4 oz)   03/06/16 102.1 kg (225 lb)   05/13/14 102.5 kg (226 lb)     Blood pressure 128/63, pulse 71, temperature (!) 37 °F (2.8 °C), resp. rate 16, height 6' 2\" (1.88 m), weight 65.5 kg (144 lb 6.4 oz), SpO2 97 %.   Pain:  Pain Scale 1: Adult Nonverbal Pain Scale  Pain Intensity 1: 0                     Constitutional: elderly ill appearing male who is orally intubated in ICU in no current acute distress   ENMT: orally intubated   Respiratory: ventilator supported  Skin: warm, dry  Neurologic: moving extremities, does not follow commands off sedation        HISTORY:     Active Problems:    Respiratory failure ([address] (3/6/2017)      Sepsis ([address] (3/6/2017)      Past Medical History:   Diagnosis Date    Chronic back pain     s/p Leminectomy and discectomy (2011)    Depression     Diabetes mellitus     Herniated disc     status post laminectomy and diskectomy surgery in January    Hyperlipidemia     Hypertension     Hypertensive heart disease     Kidney stones     Nephrocalcinosis     Other ill-defined conditions(799.89)     AFIB    PVC (premature ventricular contraction)     Restless leg syndrome     Stroke ([address] 6/7/12    TIA    TIA (transient ischemic attack)       Past Surgical History:   Procedure Laterality Date    HX LUMBAR DISKECTOMY  january 2011    LAMINECTOMY,LUMBAR      LITHOTRIPSY        Family History   Problem Relation Age of Onset    Heart Failure Mother  Stroke Mother     Heart Failure Father      History reviewed, no pertinent family history.   Social History   Substance Use Topics    Smoking status: Never Smoker    Smokeless tobacco: Not on file    Alcohol use No     Allergies   Allergen Reactions    Sulfa (Sulfonamide Antibiotics) Unknown (comments)    Sulfa (Sulfonamide Antibiotics) Unknown (comments)      Current Facility-Administered Medications   Medication Dose Route Frequency    NOREPINephrine (LEVOPHED) 8,000 mcg in dextrose 5% 250 mL infusion  2-16 mcg/min IntraVENous TITRATE    heparin (porcine) injection 5,000 Units  5,000 Units SubCUTAneous Q12H    sodium bicarbonate (8.4%) 50 mEq in dextrose 5% 1,000 mL infusion   IntraVENous CONTINUOUS    sodium chloride (NS) flush 5-10 mL  5-10 mL IntraVENous PRN    levoFLOXacin (LEVAQUIN) 750 mg in D5W IVPB  750 mg IntraVENous Q48H    dexmedetomidine (PRECEDEX) 400 mcg in 0.9% sodium chloride 100 mL infusion  0.2-0.7 mcg/kg/hr IntraVENous TITRATE    piperacillin-tazobactam (ZOSYN) 3.375 g in 0.9% sodium chloride (MBP/ADV) 100 mL MBP  3.375 g IntraVENous Q6H    pantoprazole (PROTONIX) 40 mg in sodium chloride 0.9 % 10 mL injection  40 mg IntraVENous DAILY    chlorhexidine (PERIDEX) 0.12 % mouthwash 10 mL  10 mL Oral Q12H    albuterol-ipratropium (DUO-NEB) 2.5 MG-0.5 MG/3 ML  3 mL Nebulization Q6H PRN    vancomycin (VANCOCIN) 750 mg in 0.9% sodium chloride (MBP/ADV) 250 mL ADV  750 mg IntraVENous Q36H    [START ON 3/9/2017] VANCOMYCIN INFORMATION NOTE   Other ONCE    sodium chloride (NS) flush 5-10 mL  5-10 mL IntraVENous Q8H    sodium chloride (NS) flush 5-10 mL  5-10 mL IntraVENous PRN    insulin lispro (HUMALOG) injection   SubCUTAneous Q6H    glucose chewable tablet 16 g  4 Tab Oral PRN    glucagon (GLUCAGEN) injection 1 mg  1 mg IntraMUSCular PRN    dextrose (D50W) injection syrg 12.5-25 g  25-50 mL IntraVENous PRN        LAB AND IMAGING FINDINGS:     Lab Results   Component Value Date/Time    WBC 9.4 03/07/2017 03:35 AM    HGB 12.2 03/07/2017 03:35 AM    PLATELET 938 11/49/4497 03:35 AM     Lab Results   Component Value Date/Time    Sodium 162 03/07/2017 07:00 AM    Potassium 3.8 03/07/2017 03:35 AM    Chloride 133 03/07/2017 03:35 AM    CO2 19 03/07/2017 03:35 AM    BUN 81 03/07/2017 03:35 AM    Creatinine 2.59 03/07/2017 03:35 AM    Calcium 7.9 03/07/2017 03:35 AM    Magnesium 3.0 03/06/2017 03:20 PM    Phosphorus 3.2 04/22/2014 07:45 PM      Lab Results   Component Value Date/Time    AST (SGOT) 43 03/06/2017 03:20 PM    Alk. phosphatase 103 03/06/2017 03:20 PM    Protein, total 7.0 03/06/2017 03:20 PM    Albumin 3.1 03/06/2017 03:20 PM    Globulin 3.9 03/06/2017 03:20 PM     Lab Results   Component Value Date/Time    INR 1.4 03/07/2017 10:15 AM    Prothrombin time 16.9 03/07/2017 10:15 AM    aPTT 32.5 05/13/2014 08:32 AM      No results found for: IRON, FE, TIBC, IBCT, PSAT, FERR   No results found for: PH, PCO2, PO2  No components found for: [place]   Lab Results   Component Value Date/Time     03/07/2017 07:00 AM    CK - MB 11.8 03/07/2017 07:00 AM              Total time:  50 minutes   Counseling / coordination time: 30 minutes   > 50% counseling / coordination?:     Prolonged service was provided for  []30 min   []75 min in face to face time in the presence of the patient. Time Start:   Time End:   Note: this can only be billed with 84771 (initial) or 68335 (follow up). If multiple start / stop times, list each separately.

## 2017-03-07 NOTE — DIABETES MGMT
NUTRITIONAL ASSESSMENT AND  PLAN OF CARE     Glenys Kuo           81 y.o.           3/6/2017                 1. Sepsis, due to unspecified organism ([address]    2. Unresponsive    3. Acute cystitis with hematuria         INTERVENTIONS/PLAN:   1. If tube feeding needed, suggest Osmolite 1.2 bong, initial rate of 10 ml/hr increasing gradually as tolerated to initial goal rate of 60 ml/hr to provide 1728 calories, 80 grams protein, 1180 ml free water/d. 2.  Monitor feeding status, labs and weights. ASSESSMENT:   Nutritional Status:  Pt is 76% ideal wt; BMI (calculated): 18.5 kg/m2 (underweight classification). Pt with thin appearance. Currently on vent/NPO. Labs and Na, NH3 noted. Diabetes Management:   [facility] records do not list any diabetes medications at this time. [facility] admissions indicate pt was once taking metformin and Januvia back in 2014. Good glycemic control at [facility] as well as here at [facility]. Recent blood glucose:   3/7/17:  90  3/6/17:  161, 138   Within target range (non-ICU: <140; ICU<180): [x] Yes   []  No    Current Insulin regimen: corrective lispro every 6 hours, normal insulin sensitivity   Home medication/insulin regimen: none per [facility] chart review. HbA1c:  6.2% - ave BG has been ~ 125 mg/dL over past 3 months. Adequate glycemic control PTA:  [x] Yes  [] No       SUBJECTIVE/OBJECTIVE:   Information obtained from: chart review, ICU rounds  [facility] records indicate pt was receiving a regular diet with thin liquids. Pt admitted from NH with unresponsiveness and shock, UTI, REID on CKD3; PMHx of HTN, DM, TIA and stroke. NH records also list hx of Parkinson's disease and dementia. Diet: NPO    No data found.         Medications: [x]                Reviewed   IVF:  NaHCO3 50 mEq/L D5 at 125 ml/hr    Most Recent POC Glucose:   Recent Labs      03/07/17   0335  03/06/17   1520   GLU  121*  157*         Labs:   Lab Results   Component Value Date/Time    Hemoglobin A1c 6.2 03/06/2017 03:15 PM     Lab Results   Component Value Date/Time    Sodium 162 03/07/2017 07:00 AM    Potassium 3.8 03/07/2017 03:35 AM    Chloride 133 03/07/2017 03:35 AM    CO2 19 03/07/2017 03:35 AM    Anion gap 9 03/07/2017 03:35 AM    Glucose 121 03/07/2017 03:35 AM    BUN 81 03/07/2017 03:35 AM    Creatinine 2.59 03/07/2017 03:35 AM    Calcium 7.9 03/07/2017 03:35 AM    Magnesium 3.0 03/06/2017 03:20 PM    Phosphorus 3.2 04/22/2014 07:45 PM    Albumin 3.1 03/06/2017 03:20 PM   Results for Awa Mandel (MRN [id number]) as of 3/7/2017 12:10   Ref. Range 3/6/2016 08:30 3/6/2017 18:05   Ammonia Latest Ref Range: 11 - 32 UMOL/L 16 34 (H)       Anthropometrics: IBW : 86.4 kg (190 lb 7.6 oz), % IBW (Calculated): 75.81 %, BMI (calculated): 18.5  Wt Readings from Last 1 Encounters:   03/06/17 65.5 kg (144 lb 6.4 oz)      Ht Readings from Last 1 Encounters:   03/06/17 6' 2\" (1.88 m)       Estimated Nutrition Needs:  1640 calories (for now - when pt off vent, energy needs = 2293 calories/d), Protein (g): 65 g Fluid (ml): 2300 ml  Based on:   [x]          Actual BW    []          ABW   []            Adjusted BW        Nutrition Diagnoses:   Underweight due to inadequate oral food and beverage intake as evidenced by BMI of 18.5% and pt is 76% ideal wt. Nutrition Interventions:  TF recommendations  Goal:   Provision of adequate nutrition by 3/12/17 per pt and family wishes. Weight maintenance (+/- 1-2 kg) by 3/17/17.          Nutrition Monitoring and Evaluation      []     Monitor po intake on meal rounds  [x]     Continue inpatient monitoring and intervention  []     Other:      Nutrition Risk:  [x]   High     []  Moderate    []  Minimal/Uncompromised    Galina Wakefield RD   LR pager [phone]

## 2017-03-07 NOTE — H&P
History and Physical    Patient: Timbo Morales               Sex: male          DOA: 3/6/2017       YOB: 1935      Age:  80 y.o.        LOS:  LOS: 0 days        HPI:     Timbo Morales is a 80 y.o. male who presented to ED via EMS from [facility] unresponsive. Patient has a h/o HTN, DM, TIA, and stroke. He was found by staff. CPR was performed and EMS was called. EMS also found patient unresponsive. He was subsequently intubated. And brought in to the ED. While in the ED, patient was found to be severly hypotensive. He was given fluids, in which he responded. Patient will be admitted to the ICU  Past Medical History:   Diagnosis Date    Chronic back pain     s/p Leminectomy and discectomy (2011)    Depression     Diabetes mellitus     Herniated disc     status post laminectomy and diskectomy surgery in January    Hyperlipidemia     Hypertension     Hypertensive heart disease     Kidney stones     Nephrocalcinosis     Other ill-defined conditions(799.89)     AFIB    PVC (premature ventricular contraction)     Restless leg syndrome     Stroke ([facility] [address] 6/7/12    TIA    TIA (transient ischemic attack)        Past Surgical History:   Procedure Laterality Date    HX LUMBAR DISKECTOMY  january 2011   Melissa Lade      LITHOTRIPSY         Social History     Social History    Marital status:      Spouse name: N/A    Number of children: N/A    Years of education: N/A     Occupational History    Not on file.      Social History Main Topics    Smoking status: Never Smoker    Smokeless tobacco: Not on file    Alcohol use No    Drug use: Not on file    Sexual activity: Not on file     Other Topics Concern    Not on file     Social History Narrative    ** Merged History Encounter **            Family History   Problem Relation Age of Onset    Heart Failure Mother     Stroke Mother     Heart Failure Father        Allergies   Allergen Reactions    Sulfa (Sulfonamide Antibiotics) Unknown (comments)    Sulfa (Sulfonamide Antibiotics) Unknown (comments)       Review of Systems:  Positive in bold. Unable to obtain    Physical Exam:      Visit Vitals    /90    Pulse (!) 59    Temp 95.7 °F (35.4 °C)    Resp 15    Ht 6' 2\" (1.88 m)    Wt 61.2 kg (135 lb)    SpO2 96%    BMI 17.33 kg/m2       Physical Exam:  Gen:  Sedated, Intubated. HEENT:  Normal cephalic atraumatic. Neck:  Supple, No JVD  Lungs:  Course with diminished breath sounds, no wheeze, no rales. Cardiovascular:  Regular Rate and Rhythm, normal S1 and S2, no audible murmur. Capillary refill: < 3 seconds. Abdomen:  Soft, non distended, normal bowel sounds. Extremities:  Well perfused, no cyanosis, no edema   Peripheral pulse:  1+  Neurological:  Unable to assess, intubated/sedated  Skin:  No rashes or moles. Turgor and color normal  Mental Status:  Unable to assess. Laboratory Studies: All lab results for the last 24 hours reviewed. Assessment/Plan     Active Problems:    Respiratory failure ([facility] [address] (3/6/2017)      Sepsis ([facility] [address] (3/6/2017)        PLAN:      Respiratory:  Intubated, sedated. Admit to ICU    Infectious: UTI, ? Aspiration pneumonia   Continue IV antibiotics-  Levaquin, Zosyn, Vanco     CV: HTN, well controlled. Hydralazine prn while intubated. 10 mg every 6 hours as needed for SBP greater than 160. Heme:  DVT prophylaxis   Bilateral lower extremity compression devices   Heparin 5000 Units every 8 hours    Metabolic/Endo: DM   FS Q6 hrs with sliding scale coverage   Hold all home oral antihyperglycemics     GI: GI prophylaxis   protonix 40 mg IV daily    Misc:  FULL CODE   Physical therapy to evaluate and treat   Monitor intake and output   Monitor vitals as per unit   Replace electrolytes as needed. Follow up am labs.           Ivan Glover MD

## 2017-03-07 NOTE — PROGRESS NOTES
Patient has designated Mr Omar Ashley and Ms Good Castelanjimenez to participate in his/her discharge plan and to receive any needed information.      Name:   Nathaly Chin  awaiting POA paperwork  Kin Moreno       Mr Von Blevins- [phone], Ms [id number]    [place], [place]   March 7, 2017   Address:  Phone number:

## 2017-03-07 NOTE — CDMP QUERY
Please clarify if this patient is being treated/managed for:    =>Severe sepsis with Septic shock in the setting of Sepsis with hypotension and use of pressors  =>Other Explanation of clinical findings  =>Unable to Determine (no explanation of clinical findings)    The medical record reflects the following clinical findings, treatment, and risk factors:    Elderly pt brought in unresponsive. Rec'd CPR in Nursing home. In ER--BP=61/41  Pulse--150  Placed on levophed drip. H&P notes \"Sepsis\"  Pulmonologist noted  \"Severe Sepsis\"    Please document if you agree that pt had Severe Sepsis and specify if pt has Septic Shock. Please clarify and document your clinical opinion in the progress notes and discharge summary including the definitive and/or presumptive diagnosis, (suspected or probable), related to the above clinical findings. Please include clinical findings supporting your diagnosis. If you DECLINE this query or would like to communicate with [facility], please utilize the \"[employer] message box\" at the TOP of the Progress Note on the right.       Thank you,  Brian Dubose RN [facility]  [phone]

## 2017-03-07 NOTE — ED NOTES
Bedside shift report completed with Awilda Morton RN  Safety measures were reviewed  Lines traced/IV pump and IV site double checked  Activity level, PO status and vital sign trends reviewed   Monitor alarm limits on and set per order/protocol   Patient and or family participated in handoff   Clinical quality measures reviewed

## 2017-03-08 NOTE — PROGRESS NOTES
Patient completed ROM as follows. RUE X 0    LUE X 0    RLE X 10 ankle pumps flex extension /heel slides flex extension /adduction flex extension /ab flex extension    LLE X 10 ankle pumps flex extension heel slides flex extension/adduction flex extension/ab flex extension        Pt participation was:    ( ) AROM/ AAROM    (x )  PROM    Pain level before FMP: unable to verbaize    Pain level after FMP:unable to verbaize  Nonverbal Pain Scale:     ( x   ) Alerted Nursing.  (  x  ) Call bell within patient reach. ( x   ) Pt resting in no apparent distress in bed.           Gordo Morales, Rehab Tech

## 2017-03-08 NOTE — PROGRESS NOTES
2000 Assumed care of the pt, assessments completed and charted. Restless in the bed. Breathing 15-20 breaths above the vent settings pulling at lines and dressings. Notified [facility] of pt condition, orders received and written. 0000  [facility] notified of increased Pt temperature. Orders received and written. 0400  [facility] notified of electrolyte levels. Orders received and written. 0700 No further issues to report. Bedside and Verbal shift change report given to Yolanda Bonilla RN (oncoming nurse) by Leda Guillen RN (offgoing nurse). Report included the following information SBAR, Kardex, Intake/Output, MAR, Recent Results and Cardiac Rhythm SR c 1st Degree AVB & BBB c Frequent PVCs.

## 2017-03-08 NOTE — PROGRESS NOTES
Progress Note      Patient: Cris Rios               Sex: male          DOA: 3/6/2017       YOB: 1935      Age:  80 y.o.        LOS:  LOS: 2 days               Subjective:   Pt remains intubated his blood cultures are negative  The urine grew out streptococcus,beta hemolytic group B. He is afebrile he remains on pressors and is on levophed . He is also on precedex and  Iv nahco3 . He has REID  Pre renal and is being hydrated and seems to be responding . The pt's troponin is elevated at 1.39 maximum and is now  down to 0.69 . Whether he had shock due to sepsis from his uti or due to cardiac causes is not quite clear the pt 's kidney function is improving and his creatinine is 2.16 . Sarita Ames The pt's sodium is now 1`55.his potassium is low at 2.9 and his phosphorus is also low at 1.7.   the 2 d echo shows possible hypokinesis of the anterior wall . initial chest x ray was clear        Objective:      Visit Vitals    /59    Pulse (!) 50    Temp 97.7 °F (36.5 °C)    Resp 14    Ht 6' 2\" (1.88 m)    Wt 65.5 kg (144 lb 6.4 oz)    SpO2 100%    BMI 18.54 kg/m2       Physical Exam:  Pt is intubated and is sedated   Heart reg rate and rhythm   Lungs fair breath sounds herd   Abdomen soft and nontender   Neuro sedated         Lab/Data Reviewed:  CMP:   Lab Results   Component Value Date/Time     (H) 03/08/2017 03:00 AM    K 2.9 (LL) 03/08/2017 03:00 AM     (H) 03/08/2017 03:00 AM    CO2 19 (L) 03/08/2017 03:00 AM    AGAP 11 03/08/2017 03:00 AM     (H) 03/08/2017 03:00 AM    BUN 53 (H) 03/08/2017 03:00 AM    CREA 2.16 (H) 03/08/2017 03:00 AM    GFRAA 36 (L) 03/08/2017 03:00 AM    GFRNA 30 (L) 03/08/2017 03:00 AM    CA 7.7 (L) 03/08/2017 03:00 AM    MG 2.3 03/08/2017 03:00 AM    PHOS 1.7 (L) 03/08/2017 03:00 AM    ALB 2.1 (L) 03/08/2017 03:00 AM    TP 4.9 (L) 03/08/2017 03:00 AM    GLOB 2.8 03/08/2017 03:00 AM    AGRAT 0.8 03/08/2017 03:00 AM    SGOT 36 03/08/2017 03:00 AM    ALT 36 03/08/2017 03:00 AM     CBC:   Lab Results   Component Value Date/Time    WBC 8.7 03/08/2017 03:00 AM    HGB 10.8 (L) 03/08/2017 03:00 AM    HCT 33.8 (L) 03/08/2017 03:00 AM     03/08/2017 03:00 AM           Assessment/Plan     Active Problems:    Respiratory failure ([address] (3/6/2017)pt is intubated       Sepsis ([address] (3/6/2017)pt 's blood cultures were no growth and the chest x ray was clear . The urine grew out streptococcus . Encephalopathy acute (3/7/2017)      Debility (3/7/2017)  Troponin was positive with a maximum of 1.39 . Whether the shoch was due to infection vs an mi is not clear . Plan:cardiology to see the pt . Continue with the antibiotics and attempts at weaning the pt when he is ready .  He is now a dnr

## 2017-03-08 NOTE — PROGRESS NOTES
[facility]  Pulmonary, Critical Care, and Sleep Medicine    Name: Charli Monday MRN: [id number]   : 1935 Hospital: [address] [place] [address]   Date: 3/8/2017        [facility]    [x] I have reviewed the flowsheet and previous days notes. Events, vitals, medications and notes from last 24 hours reviewed. Care plan discussed with staff, patient, family and on multidisciplinary rounds. 3/8/2017  No overnight events  Off levophed, remains on precedex  Neuro status unchanged  Urine cx +proteus  ECHO EF 50%, cannot r/o hypokinesis anterior wall  Na/Cl improved, Cr improving, good UO  K+, phos being replaced      IMPRESSION:   · Shock septic +/-cardiogenic, now off pressor support  · Urosepsis, Ucx +proteus  · Acute respiratory failure requiring MV, possible aspiration  · Possible cardiac arrest, CPR performed by nursing facility  · Elevated troponin, EF 50%, cannot r/o hypokinesis of anterior wall, grade 1 DD  · Hypernatremia, hyperchloremia due to volume depletion, improved  · AfibRVR, currently converted spontaneously to NSR  · Metabolic acidosis, improved  · Lactic acidosis, resolved  · REID on CKD, likely volume depletion, hypotension, improved  · AMS/acute encephalopathy, multifactorial due to above, metabolic/infectious, unknown baseline  · Hypokalemia, hypophosphatemia, being replaced  · Anemia, no active bleeding, component dilutional  · DM  · Hx HTN  · Hx TIA/CVA  · Hx RLS    · Code status: DNR      RECOMMENDATIONS:   · CVS: Monitor HD. Currently in sinus david. ECHO as below. PICC placed. Currently off pressor support  · Respiratory: MV bundle/protocol. SBT as MS improves. Follow CXR and ABG, adjust vent prn, keep O2 sat >95%. Sputum cx. Aspiration precautions. Duonebs, abx. HOB 30 degrees  · ID:  Sepsis bundle. Urine cx +proteus. Deescalate abx today. Await blood cx: NGTD. LA normalized. Add cortisporin gtts  · Hematology/Oncology:  INR elevated slightly.  Hgb and plts lower but stable  · Renal:  Follow BUN, Cr. Trend Na, Cl. Continue IVF, free water,  [place]. Monitor UO, sandoval for now  · GI/:  PPI. Trend LFT's. Advance NGT  · Endocrine:  Normal TSH. Follow FSG, SSI prn  · Neurology/Pain/Sedation:  Precedex prn for comfort. Minimize sedation. CT head negative for acute process. Continue neuro checks. · Musculoskeletal:  Normal CK  · Skin/Wound: Local preventative wound care  · FEN: start TF as tolerated. Replace lytes prn per protocol. IVF  · Prophylaxis: GI Prophylaxis with protonix. DVT Prophylaxis with heparin. · Restraints: B wrist  · Palliative care following  ·                                                               · Glycemic Control. Glucose stabilizer per ICU protocol when on insulin drip. Maintain blood glucose 140-180. · Replace electrolytes per ICU electrolyte replacement protocol  · Ventilator bundle & Sedation protocol followed. Daily sedation holiday, assessment for readiness for SBT and then re-titrate if required. Chlorhexidine mouth washes. · HOB 30 degree elevation all the time. Aggressive pulmonary toileting. Incentive spirometry when appropriate. Aspiration precautions. · Sepsis bundle and protocol followed. Follow serial lactic acid, frequent BMP check, fluid resuscitation. Follow cultures. Deescalate antibiotic when appropriate. · Sandoval bundle followed, remove sandoval catheter when not critically ill. · Central Line bundle followed, remove when not needed. · Skin & Wound care. · Weekly prealbumin, nutritional consult. · PT/OT eval and treat. OOB when appropriate. · Further recommendations will be based on the patient's response to recommended treatment and results of the investigation ordered. · Quality Care: PPI, DVT prophylaxis, HOB elevated, Infection control all reviewed and addressed. · Events and notes from last 24 hours reviewed.  Care plan discussed with nursing     Review of Systems:  Review of systems not obtained due to patient factors. Allergies   Allergen Reactions    Sulfa (Sulfonamide Antibiotics) Unknown (comments)    Sulfa (Sulfonamide Antibiotics) Unknown (comments)      Past Medical History:   Diagnosis Date    Chronic back pain     s/p Leminectomy and discectomy (2011)    Depression     Diabetes mellitus     Herniated disc     status post laminectomy and diskectomy surgery in January    Hyperlipidemia     Hypertension     Hypertensive heart disease     Kidney stones     Nephrocalcinosis     Other ill-defined conditions(799.89)     AFIB    PVC (premature ventricular contraction)     Restless leg syndrome     Stroke ([facility] [address] 6/7/12    TIA    TIA (transient ischemic attack)       Past Surgical History:   Procedure Laterality Date    HX LUMBAR DISKECTOMY  january 2011    LAMINECTOMY,LUMBAR      LITHOTRIPSY        Social History   Substance Use Topics    Smoking status: Never Smoker    Smokeless tobacco: Not on file    Alcohol use No      Family History   Problem Relation Age of Onset    Heart Failure Mother     Stroke Mother     Heart Failure Father       Prior to Admission medications    Medication Sig Start Date End Date Taking? Authorizing Provider   rOPINIRole (REQUIP) 0.5 mg tablet Take 0.5 mg by mouth three (3) times daily. Haider Salamanca MD   multivitamin (ONE A DAY) tablet Take 1 Tab by mouth daily. Haider Salamanca MD   potassium 99 mg tablet Take 99 mg by mouth daily. MD JOYCE Rollins MD OTHER Phys Other, MD   cyanocobalamin (VITAMIN B12) 1,000 mcg/mL injection 1 mL by IntraMUSCular route daily. 5/17/14   Minerva Abbott MD   cyanocobalamin (VITAMIN B-12) 1,000 mcg tablet Take 1 Tab by mouth daily. 5/16/14   Minerva Abbott MD   Aspirin, Buffered 81 mg tab Take 81 mg by mouth. Haider Salamanca MD   metoprolol (LOPRESSOR) 25 mg tablet Take 0.5 Tabs by mouth every twelve (12) hours.  4/24/14   Kwesi Teran MD   diazepam (VALIUM) 5 mg tablet Take 2.5 mg by mouth two (2) times a day. Historical Provider   tamsulosin (FLOMAX) 0.4 mg capsule Take 0.4 mg by mouth daily. Historical Provider   darifenacin (ENABLEX) 7.5 mg ER tablet Take 7.5 mg by mouth daily. Historical Provider   FLUoxetine (PROZAC) 40 mg capsule Take 40 mg by mouth daily. Historical Provider   pramipexole (MIRAPEX) 1.5 mg tablet Take 1.5 mg by mouth three (3) times daily. Historical Provider   METFORMIN HCL (METFORMIN PO) Take 500 mg by mouth Before breakfast, lunch, and dinner. Historical Provider   OMEPRAZOLE (PRILOSEC PO) Take  by mouth. Historical Provider   atorvastatin (LIPITOR) 80 mg tablet Take 40 mg by mouth daily. Historical Provider   sitaGLIPtin (JANUVIA) 50 mg tablet Take 50 mg by mouth daily. Historical Provider   lisinopril (PRINIVIL, ZESTRIL) 2.5 mg tablet Take  by mouth daily.     Historical Provider     Current Facility-Administered Medications   Medication Dose Route Frequency    potassium chloride 10 mEq in 100 ml IVPB  10 mEq IntraVENous Q1H    potassium chloride 10 mEq/100 mL IVPB premix pgbk        sodium phosphate 9 mmol in 0.9% sodium chloride 250 mL infusion  9 mmol IntraVENous ONCE    NOREPINephrine (LEVOPHED) 8,000 mcg in dextrose 5% 250 mL infusion  2-16 mcg/min IntraVENous TITRATE    heparin (porcine) injection 5,000 Units  5,000 Units SubCUTAneous Q12H    sodium bicarbonate (8.4%) 50 mEq in dextrose 5% 1,000 mL infusion   IntraVENous CONTINUOUS    sodium chloride (NS) flush 10-40 mL  10-40 mL InterCATHeter Q8H    piperacillin-tazobactam (ZOSYN) 3.375 g in  mL MBP##EXTENDED INFUSION  3.375 g IntraVENous Q8H    levoFLOXacin (LEVAQUIN) 750 mg in D5W IVPB  750 mg IntraVENous Q48H    dexmedetomidine (PRECEDEX) 400 mcg in 0.9% sodium chloride 100 mL infusion  0.2-0.7 mcg/kg/hr IntraVENous TITRATE    pantoprazole (PROTONIX) 40 mg in sodium chloride 0.9 % 10 mL injection  40 mg IntraVENous DAILY    chlorhexidine (PERIDEX) 0.12 % mouthwash 10 mL  10 mL Oral Q12H    vancomycin (VANCOCIN) 750 mg in 0.9% sodium chloride (MBP/ADV) 250 mL ADV  750 mg IntraVENous Q36H    [START ON 3/9/2017] VANCOMYCIN INFORMATION NOTE   Other ONCE    insulin lispro (HUMALOG) injection   SubCUTAneous Q6H       Lines: All central lines examined by me. No signs of erythema, induration, discharge.       Peripheral Intravenous Line:  Peripheral IV 17 Left Wrist (Active)   Site Assessment Clean, dry, & intact 3/6/2017  3:30 PM   Phlebitis Assessment 0 3/6/2017  3:30 PM   Infiltration Assessment 0 3/6/2017  3:30 PM   Dressing Status Clean, dry, & intact 3/6/2017  3:30 PM       Peripheral IV 17 Right Antecubital (Active)   Site Assessment Clean, dry, & intact 3/6/2017  3:34 PM   Phlebitis Assessment 0 3/6/2017  3:34 PM   Infiltration Assessment 0 3/6/2017  3:34 PM   Dressing Status Clean, dry, & intact 3/6/2017  3:34 PM       Peripheral IV 17 Left Antecubital (Active)   Site Assessment Clean, dry, & intact 3/6/2017  3:37 PM   Phlebitis Assessment 0 3/6/2017  3:37 PM   Infiltration Assessment 0 3/6/2017  3:37 PM   Dressing Status Clean, dry, & intact 3/6/2017  3:37 PM      Airway:  Airway - Endotracheal Tube 17 Oral (Active)   Insertion Depth (cm) 22 cm 3/6/2017  3:55 PM   Line Lucas Lips 3/6/2017  3:55 PM   Side Secured Centered 3/6/2017  3:55 PM   Site Assessment Clean, dry, & intact 3/6/2017  3:55 PM       Telemetry:normal sinus rhythm    Objective:   Vital Signs:    Visit Vitals    /67    Pulse (!) 54    Temp 98.8 °F (37.1 °C)    Resp 20    Ht 6' 2\" (1.88 m)    Wt 65.5 kg (144 lb 6.4 oz)    SpO2 100%    BMI 18.54 kg/m2       O2 Device: Endotracheal tube, Ventilator       Temp (24hrs), Av.1 °F (18.9 °C), Min:36.5 °F (2.5 °C), Max:100.6 °F (38.1 °C)       Intake/Output:   Last shift:           Last 3 shifts:  1901 -  0700  In: 6698.8 [I.V.:5438.8]  Out: 2235 [Urine:2235]      Intake/Output Summary (Last 24 hours) at 17 0843  Last data filed at 03/08/17 0700   Gross per 24 hour   Intake          4153.56 ml   Output             1455 ml   Net          2698.56 ml       Last 3 Recorded Weights in this Encounter    03/06/17 1508 03/06/17 2100 03/07/17 1959   Weight: 61.2 kg (135 lb) 65.5 kg (144 lb 6.4 oz) 65.5 kg (144 lb 6.4 oz)       Ventilator Settings:  Mode Rate Tidal Volume Pressure FiO2 PEEP   Assist control, VC+   450 ml    30 % 5 cm H20     Peak airway pressure: 11 cm H2O    Plateau pressure:     Tidal volume:    Minute ventilation: 11 l/min   SPO2      ARDS network Guidelines: Lung protective strategy and Plateau pressure goals less than or equal to 30      Physical Exam:     General/Neurology: Intubated, on precedex, moving all extremities, PERRL, R slightly more sluggish, ?attempting to grasp hands and move feet, appears older than stated age  Head:   Temporal wasting  Eye:   No scleral icterus, +yellow crusted discharge  Nose:   No erythema  Throat:  ETT in place, OGT  Lung:   Adequate air entry bilateral equal, no wheeze/rhonchi  Heart:   Vinnie Sinks, no m  Abdomen:  Soft, ND, +BS, no masses, no organomegaly  :  Smith in place, clear yellow urine  Extremities:  No pedal edema, no cyanosis   Musculoskeletal: No joint swelling or tenderness.   Skin:   Warm and dry      Data:       Recent Results (from the past 24 hour(s))   PROTHROMBIN TIME + INR    Collection Time: 03/07/17 10:15 AM   Result Value Ref Range    Prothrombin time 16.9 (H) 11.5 - 15.2 sec    INR 1.4 (H) 0.8 - 1.2     CARDIAC PANEL,(CK, CKMB & TROPONIN)    Collection Time: 03/07/17  1:05 PM   Result Value Ref Range     39 - 308 U/L    CK - MB 10.5 (H) <3.6 ng/ml    CK-MB Index 4.1 (H) 0.0 - 4.0 %    Troponin-I, Qt. 0.97 (H) 0.0 - 0.045 NG/ML   GLUCOSE, POC    Collection Time: 03/07/17  1:08 PM   Result Value Ref Range    Glucose (POC) 140 (H) 70 - 110 mg/dL   GLUCOSE, POC    Collection Time: 03/07/17  5:43 PM   Result Value Ref Range    Glucose (POC) 141 (H) 70 - 110 mg/dL   CARDIAC PANEL,(CK, CKMB & TROPONIN)    Collection Time: 03/07/17  7:45 PM   Result Value Ref Range     39 - 308 U/L    CK - MB 7.6 (H) <3.6 ng/ml    CK-MB Index 3.3 0.0 - 4.0 %    Troponin-I, Qt. 0.69 (H) 0.0 - 0.045 NG/ML   GLUCOSE, POC    Collection Time: 03/07/17 11:44 PM   Result Value Ref Range    Glucose (POC) 135 (H) 70 - 110 mg/dL   PROTHROMBIN TIME + INR    Collection Time: 03/08/17  3:00 AM   Result Value Ref Range    Prothrombin time 17.0 (H) 11.5 - 15.2 sec    INR 1.4 (H) 0.8 - 1.2     CBC WITH AUTOMATED DIFF    Collection Time: 03/08/17  3:00 AM   Result Value Ref Range    WBC 8.7 4.6 - 13.2 K/uL    RBC 3.36 (L) 4.70 - 5.50 M/uL    HGB 10.8 (L) 13.0 - 16.0 g/dL    HCT 33.8 (L) 36.0 - 48.0 %    .6 (H) 74.0 - 97.0 FL    MCH 32.1 24.0 - 34.0 PG    MCHC 32.0 31.0 - 37.0 g/dL    RDW 14.1 11.6 - 14.5 %    PLATELET 018 018 - 170 K/uL    MPV 10.9 9.2 - 11.8 FL    NEUTROPHILS 72 40 - 73 %    LYMPHOCYTES 19 (L) 21 - 52 %    MONOCYTES 7 3 - 10 %    EOSINOPHILS 2 0 - 5 %    BASOPHILS 0 0 - 2 %    ABS. NEUTROPHILS 6.2 1.8 - 8.0 K/UL    ABS. LYMPHOCYTES 1.7 0.9 - 3.6 K/UL    ABS. MONOCYTES 0.6 0.05 - 1.2 K/UL    ABS. EOSINOPHILS 0.2 0.0 - 0.4 K/UL    ABS. BASOPHILS 0.0 0.0 - 0.06 K/UL    DF AUTOMATED     METABOLIC PANEL, COMPREHENSIVE    Collection Time: 03/08/17  3:00 AM   Result Value Ref Range    Sodium 155 (H) 136 - 145 mmol/L    Potassium 2.9 (LL) 3.5 - 5.5 mmol/L    Chloride 125 (H) 100 - 108 mmol/L    CO2 19 (L) 21 - 32 mmol/L    Anion gap 11 3.0 - 18 mmol/L    Glucose 136 (H) 74 - 99 mg/dL    BUN 53 (H) 7.0 - 18 MG/DL    Creatinine 2.16 (H) 0.6 - 1.3 MG/DL    BUN/Creatinine ratio 25 (H) 12 - 20      GFR est AA 36 (L) >60 ml/min/1.73m2    GFR est non-AA 30 (L) >60 ml/min/1.73m2    Calcium 7.7 (L) 8.5 - 10.1 MG/DL    Bilirubin, total 0.7 0.2 - 1.0 MG/DL    ALT (SGPT) 36 16 - 61 U/L    AST (SGOT) 36 15 - 37 U/L    Alk.  phosphatase 69 45 - 117 U/L    Protein, total 4.9 (L) 6.4 - 8.2 g/dL Albumin 2.1 (L) 3.4 - 5.0 g/dL    Globulin 2.8 2.0 - 4.0 g/dL    A-G Ratio 0.8 0.8 - 1.7     MAGNESIUM    Collection Time: 03/08/17  3:00 AM   Result Value Ref Range    Magnesium 2.3 1.8 - 2.4 mg/dL   PHOSPHORUS    Collection Time: 03/08/17  3:00 AM   Result Value Ref Range    Phosphorus 1.7 (L) 2.5 - 4.9 MG/DL         Chemistry Recent Labs      03/08/17   0300  03/07/17   0700  03/07/17   0335  03/06/17   1520   GLU  136*   --   121*  157*   NA  155*  162*  161*  156*   K  2.9*   --   3.8  4.4   CL  125*   --   133*  125*   CO2  19*   --   19*  17*   BUN  53*   --   81*  87*   CREA  2.16*   --   2.59*  3.24*   CA  7.7*   --   7.9*  9.8   MG  2.3   --    --   3.0*   PHOS  1.7*   --    --    --    AGAP  11   --   9  14   BUCR  25*   --   31*  27*   AP  69   --    --   103   TP  4.9*   --    --   7.0   ALB  2.1*   --    --   3.1*   GLOB  2.8   --    --   3.9   AGRAT  0.8   --    --   0.8        Lactic Acid No results found for: LAC  No results for input(s): LAC in the last 72 hours. Liver Enzymes Protein, total   Date Value Ref Range Status   03/08/2017 4.9 (L) 6.4 - 8.2 g/dL Final     Albumin   Date Value Ref Range Status   03/08/2017 2.1 (L) 3.4 - 5.0 g/dL Final     Globulin   Date Value Ref Range Status   03/08/2017 2.8 2.0 - 4.0 g/dL Final     A-G Ratio   Date Value Ref Range Status   03/08/2017 0.8 0.8 - 1.7   Final     AST (SGOT)   Date Value Ref Range Status   03/08/2017 36 15 - 37 U/L Final     Alk.  phosphatase   Date Value Ref Range Status   03/08/2017 69 45 - 117 U/L Final     Recent Labs      03/08/17   0300  03/06/17   1520   TP  4.9*  7.0   ALB  2.1*  3.1*   GLOB  2.8  3.9   AGRAT  0.8  0.8   SGOT  36  43*   AP  69  103        CBC w/Diff Recent Labs      03/08/17   0300  03/07/17   0335  03/06/17   1520   WBC  8.7  9.4  10.0   RBC  3.36*  3.81*  4.26*   HGB  10.8*  12.2*  14.0   HCT  33.8*  38.7  43.3   PLT  149  177  232   GRANS  72   --   80*   LYMPH  19*   --   14*   EOS  2   --   0        Cardiac Enzymes Lab Results   Component Value Date/Time     03/07/2017 07:45 PM     03/07/2017 01:05 PM    CKMB 7.6 (H) 03/07/2017 07:45 PM    CKMB 10.5 (H) 03/07/2017 01:05 PM    CKND1 3.3 03/07/2017 07:45 PM    CKND1 4.1 (H) 03/07/2017 01:05 PM    TROIQ 0.69 (H) 03/07/2017 07:45 PM    TROIQ 0.97 (H) 03/07/2017 01:05 PM        BNP No results found for: BNP, BNPP, XBNPT     Coagulation Recent Labs      03/08/17   0300  03/07/17   1015   PTP  17.0*  16.9*   INR  1.4*  1.4*         Thyroid  Lab Results   Component Value Date/Time    T4, Total 7.8 04/22/2014 08:25 AM    TSH 3.08 03/06/2017 03:20 PM       Lab Results   Component Value Date/Time    T4, Total 7.8 04/22/2014 08:25 AM        Lipid Panel Lab Results   Component Value Date/Time    Cholesterol, total 135 06/08/2012 05:25 AM    HDL Cholesterol 34 06/08/2012 05:25 AM    LDL, calculated 80.8 06/08/2012 05:25 AM    VLDL, calculated 20.2 06/08/2012 05:25 AM    Triglyceride 101 06/08/2012 05:25 AM    CHOL/HDL Ratio 4.0 06/08/2012 05:25 AM        ABG Recent Labs      03/07/17   0811  03/06/17   1653   PHI  7.289*  7.259*   PCO2I  30.0*  36.1   PO2I  136*  345*   HCO3I  14.4*  16.4*   FIO2I  30  60        Urinalysis Lab Results   Component Value Date/Time    Color DARK YELLOW 03/06/2017 03:38 PM    Appearance TURBID 03/06/2017 03:38 PM    Specific gravity 1.020 03/06/2017 03:38 PM    pH (UA) >8.5 03/06/2017 03:38 PM    Protein 300 03/06/2017 03:38 PM    Glucose NEGATIVE  03/06/2017 03:38 PM    Ketone TRACE 03/06/2017 03:38 PM    Bilirubin NEGATIVE  03/06/2017 03:38 PM    Urobilinogen 1.0 03/06/2017 03:38 PM    Nitrites NEGATIVE  03/06/2017 03:38 PM    Leukocyte Esterase LARGE 03/06/2017 03:38 PM    Epithelial cells FEW 03/06/2017 03:38 PM    Bacteria 4+ 03/06/2017 03:38 PM    WBC TOO NUMEROUS TO COUNT 03/06/2017 03:38 PM    RBC TOO NUMEROUS TO COUNT 03/06/2017 03:38 PM        Micro  Recent Labs      03/06/17   1550  03/06/17   1538  03/06/17   1520   CULT  NO GROWTH AFTER 19 HOURS  >100,000  COLONIES/mL  PROTEUS MIRABILIS  *  NO GROWTH AFTER 19 HOURS     Recent Labs      03/06/17   1550  03/06/17   1538  03/06/17   1520   CULT  NO GROWTH AFTER 19 HOURS  >100,000  COLONIES/mL  PROTEUS MIRABILIS  *  NO GROWTH AFTER 19 HOURS        EKG  No results found for this or any previous visit. PFT  No flowsheet data found. Other  ASA reactivity:   Pre-albumin:   Ionized Calcium:   NH4:   T3, FT4:  Cortisol:  Urine Osm:  Urine Lytes:   HbA1c:      Ultrasound       LE Doppler       ECHO  3/7/17  Left ventricle: Systolic function was mildly reduced by visual assessment. Ejection fraction was estimated to be 50 %. There was possible hypokinesis  of anterior wall. Doppler parameters were consistent with abnormal left  ventricular relaxation (grade 1 diastolic dysfunction). Right ventricle: Systolic function was normal.    Aortic valve: There was no stenosis. Tricuspid valve: There was mild regurgitation. Inferior vena cava, hepatic veins: The inferior vena cava was dilated. Unable to evaluate inspiratory variation due to intubation. CT (Most Recent)    Results from Hospital Encounter encounter on 03/06/17   CT HEAD WO CONT   Narrative EXAM: CT head    INDICATION: Altered mental status    COMPARISON: Several prior exams, most recently 12/24/2016    TECHNIQUE: Axial CT imaging of the head was performed without intravenous  contrast.    One or more dose reduction techniques were used on this CT: automated exposure  control, adjustment of the mAs and/or kVp according to patient's size, and  iterative reconstruction techniques. The specific techniques utilized on this CT  exam have been documented in the patient's electronic medical record.     _______________    FINDINGS: Examination detail is mildly limited by motion artifact.     BRAIN AND POSTERIOR FOSSA: There is mild cortical and cerebellar volume loss  present, the appearance of which is similar to prior examinations. There is a  stable appearance to the ventricular size and configuration. The basilar  cisterns are patent. Mild subcortical and periventricular white matter  hypoattenuation is unchanged. Physiologic bilateral basal ganglia calcifications  are present. There is no intracranial hemorrhage, mass effect, or midline shift. There are no areas of abnormal parenchymal attenuation. EXTRA-AXIAL SPACES AND MENINGES: There are no abnormal extra-axial fluid  collections. CALVARIUM: Intact. SINUSES: There is mild mucosal thickening of the anterior and posterior ethmoid  air cells. OTHER: Atherosclerotic calcification of bilateral carotid siphons noted. _______________         Impression IMPRESSION:      1. Mildly motion limited examination, without evidence of acute intracranial  abnormality. Of note, noncontrast head CT can be normal in the context of early  acute stroke. 2. Unchanged subcortical and periventricular white matter hypoattenuation, a  nonspecific finding likely pertaining to chronic ischemic microvascular change. 3. Minimal, nonspecific mucosal thickening of the anterior and posterior ethmoid  air cells. XR (Most Recent). CXR  reviewed by me and compared with previous CXR   Results from Hospital Encounter encounter on 03/06/17   XR CHEST PORT   Narrative Indication:  Intubated    Comparison:  03/07/17    Time of study - 0415    Findings:  AP erect portable chest    The heart size is normal. The endotracheal tube catheter tip is about 7.5 cm  above the sumit. The right arm PICC line catheter tip is in the SVC. The NG  tube has been withdrawn slightly. The catheter tip is just within the stomach. The side-port appears located in the distal thoracic esophagus. The lungs are  clear. No pneumothorax is evident. Impression IMPRESSION:     Catheters as discussed. NG tube should be advanced. CXR 3/8/17        Best practice :   All below core measures reviewed and addressed:   [x] Antibiotic choice. [x] Severe Sepsis bundles follwed; SIRS screen met? Yes  [x] Fluids. [x] Lactic acid ordered- initial and repeat Q6hrs if elevated till normalized. [x] Cultures drawn. [x] Antibiotic administered within 1hr-ICU and 3hrs ED. [x] Large bore IV- 2 sites          [x] Pressors aim MAP >65mmHg. [x] Steroids. [x] Glycemic control. [x] Infection control. [x] Mech. Ventilated patients- aim to keep peak plateau pressure 31-05TG H2O. [x] HOB at >= 30 degree. [x] Stress ulcer prophylaxis. [x] DVT prophylaxis. [x] Central Line Bundle Followed. [x] Smith Bundle Followed. [x] Ventilator Bundle Followed. (Daily sedation holiday to assess readiness for weaning from ventilator & SBT trial starting at 6.00 am, HOB 30-degree elevation, Chlorhexidine mouth washes & Routine oral care every 4 hours, Chase tube to suction at 20-30 cm H2O, Maintain Chase tube with 5-10ml air every 4 hours, DVT prophylaxis, GI prophylaxis etc.).     The patient is: [] acutely ill Risk of deterioration: [] moderate    [x] critically ill  [x] high     [x]See my orders for details    My assessment, plan of care, findings, medications, side effects etc were discussed with:  [x] Nurse [] PT/OT    [x] Respiratory therapy [x] Dr. Alvino Julio   [] Pharmacist [x] MDR   [] Family: answered all questions to satisfaction [] Patient: answered all questions to satisfaction   [x]  [x]      [x] Case & management strategies discussed today on multidisciplinary rounds    High complexity decision making was performed during the evaluation of this patient at high risk for decompensation with multiple organ involvement      JOELLE Maciel  3/8/2017

## 2017-03-08 NOTE — ROUTINE PROCESS
7080: OGT in distal esophagus per JOELLE Reilly. Advanced to 65cm at the lip.     1642: Osmolite 1 Fermin started at 10cc/hr. Bedside shift change report given to Rick Roque RN (oncoming nurse) by Corina Garcias RN (offgoing nurse).  Report included the following information SBAR, Kardex, ED Summary, Intake/Output, MAR, Accordion, Recent Results, Med Rec Status and Cardiac Rhythm SB.

## 2017-03-08 NOTE — PROGRESS NOTES
-0849-Received patient on ventilator ACVC+ 10, VT-450, PEEP-5, FIO2-30%. O2 Sats-100% HR-57, RR-17. ETT noted to be patent and secure. ABG drawn. Results below. Respiratory will continue to monitor. [facility]    Results for Ezekiel Moffett (MRN [id number]) as of 3/8/2017 08:55   Ref. Range 3/8/2017 08:46   pH (POC) Latest Ref Range: 7.35 - 7.45   7.363   pCO2 (POC) Latest Ref Range: 35.0 - 45.0 MMHG 33.0 (L)   pO2 (POC) Latest Ref Range: 80 - 100 MMHG 151 (H)   HCO3 (POC) Latest Ref Range: 22 - 26 MMOL/L 18.7 (L)   sO2 (POC) Latest Ref Range: 92 - 97 % 99 (H)   Base deficit (POC) Latest Units: mmol/L 7   FIO2 (POC) Latest Units: % 30   Patient temp. Latest Units:   98.8   Specimen type (POC) Latest Units:   ARTERIAL   Set Rate Latest Units: bpm 10   Site Latest Units:   LEFT RADIAL   Device: Latest Units:   VENT   Total resp. rate Latest Units:   17   Mode Latest Units:   ASSIST CONTROL   Tidal volume Latest Units: ml 450   Volume control plus Latest Units:   YES   PEEP/CPAP (POC) Latest Units: cmH2O 5   Allens test (POC) Latest Units:   YES       -1620-Pt. Remains on above settings. O2 Sats-100% HR-48, RR-15. SBT not performed today as patient was bradycardiac throughout shift. -[address]

## 2017-03-08 NOTE — PROGRESS NOTES
[facility] ABX Dosing Services Update Note     Pharmacy dosing Vancomycin IV empirically for treatment of sepsis     Day of Therapy: 2    Was on a regimen of 0.75 g IV q36h    Labs:   Bun/Serum Creatinine - 53 mg/dl / 2.16 mg/dl  CrCl - 25.7 ml/min    Renal function improved    Plan: Goal trough 15-20. Change to 0.75 g IV q24hrs. Pharmacy to follow and will make changes to dose and/or frequency based on clinical status. Thanks for the consult.

## 2017-03-08 NOTE — PROGRESS NOTES
Problem: Ventilator Management  Goal: *Adequate oxygenation and ventilation  Pt. Intubated secondary to respiratory failure and sepsis      Current ventilator settings ACVC+ 10, VT-450, PEEP-5, FIO2-30%      Current ABG- 3/8/2017- 0846- pH-7.363, HCO3-18.7 PCo2-33, PO2-151, PEEP-5, FIo2-30%      Xray-3/8/2017- 0410-   The lungs are  clear. No pneumothorax is evident.      Plan: Maintain ventilatory support      Goal: Adequate oygentation and ventilation

## 2017-03-08 NOTE — PROGRESS NOTES
RENAL PROGRESS NOTE        Jelena Certain         Assessment  - REID , Nonoliguric , pre- renal secondary to septic shock ( urosepsis / cardiac arrest  ) , responding to volume support   - CKD stage 3   - Urosepsis   - Hypernatremia --> better   - Hypokalemia / hypoPO4   - AG acidosis secondary to REID     Plan   - Kidney function improving slowly with good UO , Continue to follow   - Continue with HCO3 gtt --> decrease to 100 ml / h   - Replace lytes   - Follow labs     ·                                                                                                                                Subjective:    Patient is intubated on PEEP of 5 and FIO2 20-30 % . UO 1580 , off levophed . Patient Active Problem List   Diagnosis Code    CVA (cerebral infarction) I63.9    DM (diabetes mellitus) ([facility] [address] E11.9    DM (diabetes mellitus) ([facility] [address] E11.9    HLD (hyperlipidemia) E78.5    TIA (transient ischemic attack) G45.9    Herniated disc [id number]    Hypertensive heart disease I11.9    Screening for colon cancer Z12.11    Fall W19. Jesus Pia Depression F32.9    Laceration of eyebrow, right S01.111A    Facial contusion S00.83XA    Respiratory failure (HCC) J96.90    Sepsis (HCC) A41.9    Encephalopathy acute G93.40    Debility R53.81       Current Facility-Administered Medications   Medication Dose Route Frequency Provider Last Rate Last Dose    ELECTROLYTE REPLACEMENT PROTOCOL  1 Each Other TONEY Thomas MD        ELECTROLYTE REPLACEMENT PROTOCOL  1 Each Other PRGAUTAM Thomas MD        ELECTROLYTE REPLACEMENT PROTOCOL  1 Each Other PRGAUTAM Thomas MD        potassium chloride 10 mEq in 100 ml IVPB  10 mEq IntraVENous Q1H Kamille Thomas  mL/hr at 03/08/17 0912 10 mEq at 03/08/17 0912    potassium chloride 10 mEq/100 mL IVPB premix pgbk             sodium phosphate 9 mmol in 0.9% sodium chloride 250 mL infusion  9 mmol IntraVENous MD Ton Thornton jcxf-qmsybrv-iid-hydrocort (CORTISPORIN) 3.5-400-10,000 mg-unit/g-1% ointment   Both Eyes BID Marcin Evangelista        NOREPINephrine (LEVOPHED) 8,000 mcg in dextrose 5% 250 mL infusion  2-16 mcg/min IntraVENous TITRATE Paloma Gao MD   Stopped at 03/07/17 1300    heparin (porcine) injection 5,000 Units  5,000 Units SubCUTAneous Q12H Paloma Gao MD   5,000 Units at 03/07/17 2255    sodium bicarbonate (8.4%) 50 mEq in dextrose 5% 1,000 mL infusion   IntraVENous CONTINUOUS Willi Ruano  mL/hr at 03/08/17 0912      bacitracin 500 unit/gram packet 1 Packet  1 Packet Topical PRN Shira Hebert MD        sodium chloride (NS) flush 10 mL  10 mL InterCATHeter PRN Shira Hebert MD        sodium chloride (NS) flush 10-40 mL  10-40 mL InterCATHeter Q8H Shira Hebert MD   10 mL at 03/08/17 0528    piperacillin-tazobactam (ZOSYN) 3.375 g in  mL MBP##EXTENDED INFUSION  3.375 g IntraVENous Q8H Shira Hebert MD 25 mL/hr at 03/08/17 0528 3.375 g at 03/08/17 0528    acetaminophen (TYLENOL) suppository 650 mg  650 mg Rectal Q4H PRN Nora Sheriff MD   650 mg at 03/08/17 0015    levoFLOXacin (LEVAQUIN) 750 mg in D5W IVPB  750 mg IntraVENous Q48H Brisa Bueno MD   Stopped at 03/06/17 1740    dexmedetomidine (PRECEDEX) 400 mcg in 0.9% sodium chloride 100 mL infusion  0.2-0.7 mcg/kg/hr IntraVENous TITRATE Brisa Bueno MD 3.1 mL/hr at 03/08/17 0833 0.2 mcg/kg/hr at 03/08/17 0833    pantoprazole (PROTONIX) 40 mg in sodium chloride 0.9 % 10 mL injection  40 mg IntraVENous DAILY Paloma Gao MD   40 mg at 03/08/17 0809    chlorhexidine (PERIDEX) 0.12 % mouthwash 10 mL  10 mL Oral Q12H Lorrain Console, PA   10 mL at 03/08/17 0809    albuterol-ipratropium (DUO-NEB) 2.5 MG-0.5 MG/3 ML  3 mL Nebulization Q6H PRN Lorrain Console, PA        vancomycin (VANCOCIN) 750 mg in 0.9% sodium chloride (MBP/ADV) 250 mL ADV  750 mg IntraVENous Darlene Herring  mL/hr at 03/07/17 1319 750 mg at 03/07/17 1319    [START ON 3/9/2017] VANCOMYCIN INFORMATION NOTE   Other 83 Candie Moreira MD        insulin lispro (HUMALOG) injection   SubCUTAneous Q6H Beulah Turner MD   Stopped at 03/06/17 2002    glucose chewable tablet 16 g  4 Tab Oral PRN Beulah Turner MD        glucagon (GLUCAGEN) injection 1 mg  1 mg IntraMUSCular PRN Beulah Turner MD        dextrose (D50W) injection syrg 12.5-25 g  25-50 mL IntraVENous PRN Beulah Turner MD           Objective  Vitals:    03/08/17 0700 03/08/17 0800 03/08/17 0849 03/08/17 0900   BP: 129/70 141/67  143/59   Pulse: (!) 57 (!) 54 (!) 55 (!) 55   Resp: 18 20 17    Temp: 98.8 °F (37.1 °C) 98.8 °F (37.1 °C)  98.6 °F (37 °C)   SpO2: 100% 100% 100% 100%   Weight:       Height:             Intake/Output Summary (Last 24 hours) at 03/08/17 0928  Last data filed at 03/08/17 0912   Gross per 24 hour   Intake          4014.41 ml   Output             1570 ml   Net          2444.41 ml           Admission weight: Weight: 61.2 kg (135 lb) (03/06/17 1508)  Last Weight Metrics:  Weight Loss Metrics 3/7/2017 3/6/2016 5/13/2014 4/21/2014 1/20/2014 10/16/2013 8/30/2013   Today's Wt 144 lb 6.4 oz 225 lb 226 lb 230 lb 200 lb 210 lb 220 lb   BMI 18.54 kg/m2 28.88 kg/m2 29 kg/m2 29.52 kg/m2 25.67 kg/m2 26.95 kg/m2 28.23 kg/m2             Physical Assessment:     General: Intubated , acutely ill   Neck: No jvd. LUNGS: Clear to Auscultation, No rales, rhonchi or wheezes from anterior   CVS EXM: S1, S2  RRR, no murmurs/gallops/rubs. Abdomen: soft, non tender. Lower Extremities:  no edema.     sandoval catheter       Lab    CBC w/Diff Recent Labs      03/08/17   0300  03/07/17   0335  03/06/17   1520   WBC  8.7  9.4  10.0   RBC  3.36*  3.81*  4.26*   HGB  10.8*  12.2*  14.0   HCT  33.8*  38.7  43.3   PLT  149  177  232   GRANS  72   --   80*   LYMPH  19*   --   14*   EOS  2   --   0        Chemistry Recent Labs      03/08/17   0300  03/07/17   0700 03/07/17   0335  03/06/17   1520   GLU  136*   --   121*  157*   NA  155*  162*  161*  156*   K  2.9*   --   3.8  4.4   CL  125*   --   133*  125*   CO2  19*   --   19*  17*   BUN  53*   --   81*  87*   CREA  2.16*   --   2.59*  3.24*   CA  7.7*   --   7.9*  9.8   AGAP  11   --   9  14   BUCR  25*   --   31*  27*   AP  69   --    --   103   TP  4.9*   --    --   7.0   ALB  2.1*   --    --   3.1*   GLOB  2.8   --    --   3.9   AGRAT  0.8   --    --   0.8   PHOS  1.7*   --    --    --          No results found for: IRON, FE, TIBC, IBCT, PSAT, FERR Lab Results   Component Value Date/Time    Calcium 7.7 03/08/2017 03:00 AM    Phosphorus 1.7 03/08/2017 03:00 AM          Ophelia Agustin MD  3/8/2017  9:28 AM

## 2017-03-08 NOTE — PROGRESS NOTES
Progress Note      Patient: Vicente Junior               Sex: male          DOA: 3/6/2017       YOB: 1935      Age:  80 y.o.        LOS:  LOS: 1 day               Subjective:   Help of pulmonary renal and palliative care greatly appreciated. The pt is  In septic shock  Possibly from cardiogenic source and is on pressors and is intubated . He also has REID on CKD3 and is on hypotonic fluids   He is on antibiotics and  His bp is 105/67 presently . He is febrile his sodium is 162. His troponin is elevated        Objective:      Visit Vitals    /67    Pulse 62    Temp (!) 36.7 °F (2.6 °C)    Resp 19    Ht 6' 2\" (1.88 m)    Wt 65.5 kg (144 lb 6.4 oz)    SpO2 98%    BMI 18.54 kg/m2       Physical Exam:  Intubated male   Heart reg rate and rhythm   Lungs fair breath sounds heard   Abdomen soft and no masses felt   Neuro sedated and intubated        Lab/Data Reviewed:  CMP:   Lab Results   Component Value Date/Time     (HH) 03/07/2017 07:00 AM    K 3.8 03/07/2017 03:35 AM     (H) 03/07/2017 03:35 AM    CO2 19 (L) 03/07/2017 03:35 AM    AGAP 9 03/07/2017 03:35 AM     (H) 03/07/2017 03:35 AM    BUN 81 (H) 03/07/2017 03:35 AM    CREA 2.59 (H) 03/07/2017 03:35 AM    GFRAA 29 (L) 03/07/2017 03:35 AM    GFRNA 24 (L) 03/07/2017 03:35 AM    CA 7.9 (L) 03/07/2017 03:35 AM     CBC:   Lab Results   Component Value Date/Time    WBC 9.4 03/07/2017 03:35 AM    HGB 12.2 (L) 03/07/2017 03:35 AM    HCT 38.7 03/07/2017 03:35 AM     03/07/2017 03:35 AM           Assessment/Plan     Active Problems:    Respiratory failure (HCC) (3/6/2017)pt is intubated       Sepsis ([address] (3/6/2017)pt is on antibiotics       Encephalopathy acute (3/7/2017)      Debility (3/7/2017)  REID on CKD 3  R/o mi . Adrianna in the inferior wall . Plan:continue thepressors . Continue nahco3 drip  For his REID . Gustavo Wade

## 2017-03-09 NOTE — PROGRESS NOTES
Speech Therapy Screening:  Services are not indicated at this time. [facility] based on results obtained during the nursing admission assessment. The patients chart was reviewed and the patient is not appropriate for a skilled therapy evaluation at this time. Please consult speech therapy if any therapy needs arise. Thank you.     Siobhan David, SLP

## 2017-03-09 NOTE — PROGRESS NOTES
Progress Note      Patient: Campos Crawford               Sex: male          DOA: 3/6/2017       YOB: 1935      Age:  80 y.o.        LOS:  LOS: 3 days               Subjective:   Pt is growing out proteus in the urine . He is on zosyn for this B POSITIVE cultures have been negative . What has caused his respiratory failure with hypotension and shock is probably multifactorial . It would have been more easily explained if his blood cultures were positive . The respiratory failure may have been due to aspiration he had an elevated troponin . R/o possible mi .       Objective:      Visit Vitals    /54    Pulse 60    Temp 98.6 °F (37 °C)    Resp 14    Ht 6' 2\" (1.88 m)    Wt 71.9 kg (158 lb 8.2 oz)    SpO2 100%    BMI 20.35 kg/m2       Physical Exam:  pt is sedated and intubated   Heart reg rate and rhythm   Lungs fair breath sounds heard   Abdomen soft and no masses felt   Neuro sedated    Lab/Data Reviewed:  CMP:   Lab Results   Component Value Date/Time     (H) 03/09/2017 03:52 AM    K 3.1 (L) 03/09/2017 04:30 PM     (H) 03/09/2017 03:52 AM    CO2 22 03/09/2017 03:52 AM    AGAP 7 03/09/2017 03:52 AM     (H) 03/09/2017 03:52 AM    BUN 35 (H) 03/09/2017 03:52 AM    CREA 1.45 (H) 03/09/2017 03:52 AM    GFRAA 57 (L) 03/09/2017 03:52 AM    GFRNA 47 (L) 03/09/2017 03:52 AM    CA 7.5 (L) 03/09/2017 03:52 AM    MG 2.1 03/09/2017 03:52 AM    PHOS 1.4 (L) 03/09/2017 03:52 AM    ALB 2.0 (L) 03/09/2017 03:52 AM    TP 5.0 (L) 03/09/2017 03:52 AM    GLOB 3.0 03/09/2017 03:52 AM    AGRAT 0.7 (L) 03/09/2017 03:52 AM    SGOT 48 (H) 03/09/2017 03:52 AM    ALT 40 03/09/2017 03:52 AM     CBC:   Lab Results   Component Value Date/Time    WBC 7.7 03/09/2017 03:52 AM    HGB 11.1 (L) 03/09/2017 03:52 AM    HCT 33.8 (L) 03/09/2017 03:52 AM     03/09/2017 03:52 AM           Assessment/Plan     Active Problems:    Respiratory failure ([facility] [address] (3/6/2017)      Sepsis ([facility] [address] (3/6/2017) Encephalopathy acute (3/7/2017)      Debility (3/7/2017)        Plan:continue supportive care . discussed with pulmonary

## 2017-03-09 NOTE — PROGRESS NOTES
[facility]  Pulmonary, Critical Care, and Sleep Medicine    Name: Heavenly Sanders MRN: [id number]   : 1935 Hospital: [facility]   Date: 3/9/2017        PCCM Progress Note    [x] I have reviewed the flowsheet and previous days notes. Events, vitals, medications and notes from last 24 hours reviewed. Care plan discussed with staff, patient, family and on multidisciplinary rounds. 3/9/2017  No overnight events  Afebrile, HD stable  More awake today, attempting to [facility] and track  Urine cx +proteus  Cr and Na improving, bicarb normalized    IMPRESSION:   · Shock septic, now off pressor support  · Urosepsis, Ucx +proteus  · Acute respiratory failure requiring MV, possible aspiration  · Possible cardiac arrest, CPR performed by nursing facility  · Elevated troponin, EF 50%, cannot r/o hypokinesis of anterior wall, grade 1 DD  · Hypernatremia, hyperchloremia due to volume depletion, improving  · AfibRVR, currently converted spontaneously to NSR  · Metabolic acidosis, improved  · Lactic acidosis, resolved  · REID on CKD, likely volume depletion, hypotension, improved  · AMS/acute encephalopathy, multifactorial due to above, metabolic/infectious, unknown baseline  · Hypokalemia, hypophosphatemia, being replaced  · Anemia, no active bleeding, component dilutional  · DM  · Hx HTN  · Hx TIA/CVA  · Hx RLS    · Code status: DNR      RECOMMENDATIONS:   · CVS: Monitor HD. Currently sinus david. ECHO as below. PICC placed. Currently off pressor support. Enzymes trended down  · Respiratory: MV bundle/protocol. SBT as MS improves. Follow CXR and ABG, adjust vent prn, keep O2 sat >95%. Sputum cx. Aspiration precautions. Duonebs, abx. HOB 30 degrees  · ID:  Sepsis bundle. Urine cx +proteus. Cont zosyn, d/c levaquin and vanc. Await blood cx: NGTD. LA normalized. cortisporin eye gtts  · Hematology/Oncology:  INR elevated slightly.  Hgb and plts stable  · Renal:  Follow BUN, Cr. Trend Na, Cl. Continue  free water,  [place]icar[place]. Monitor UO, sandoval for now  · GI/:  PPI. Trend LFT's. NGT  · Endocrine:  Normal TSH. Follow FSG, SSI prn  · Neurology/Pain/Sedation:  Precedex prn for comfort-currently off. Minimize sedation. CT head negative for acute process. Continue neuro checks. · Musculoskeletal:  Normal CK  · Skin/Wound: Local preventative wound care  · FEN: start TF, advance as tolerated. Replace lytes prn per protocol. IVF  · Prophylaxis: GI Prophylaxis with protonix. DVT Prophylaxis with heparin. · Restraints: B wrist  · Palliative care following  ·                                                               · Glycemic Control. Glucose stabilizer per ICU protocol when on insulin drip. Maintain blood glucose 140-180. · Replace electrolytes per ICU electrolyte replacement protocol  · Ventilator bundle & Sedation protocol followed. Daily sedation holiday, assessment for readiness for SBT and then re-titrate if required. Chlorhexidine mouth washes. · HOB 30 degree elevation all the time. Aggressive pulmonary toileting. Incentive spirometry when appropriate. Aspiration precautions. · Sepsis bundle and protocol followed. Follow serial lactic acid, frequent BMP check, fluid resuscitation. Follow cultures. Deescalate antibiotic when appropriate. · Sandoval bundle followed, remove sandoval catheter when not critically ill. · Central Line bundle followed, remove when not needed. · Skin & Wound care. · Weekly prealbumin, nutritional consult. · PT/OT eval and treat. OOB when appropriate. · Further recommendations will be based on the patient's response to recommended treatment and results of the investigation ordered. · Quality Care: PPI, DVT prophylaxis, HOB elevated, Infection control all reviewed and addressed. · Events and notes from last 24 hours reviewed. Care plan discussed with nursing     Review of Systems:  Review of systems not obtained due to patient factors.       Allergies   Allergen Reactions    Sulfa (Sulfonamide Antibiotics) Unknown (comments)    Sulfa (Sulfonamide Antibiotics) Unknown (comments)      Past Medical History:   Diagnosis Date    Chronic back pain     s/p Leminectomy and discectomy (2011)    Depression     Diabetes mellitus     Herniated disc     status post laminectomy and diskectomy surgery in January    Hyperlipidemia     Hypertension     Hypertensive heart disease     Kidney stones     Nephrocalcinosis     Other ill-defined conditions(799.89)     AFIB    PVC (premature ventricular contraction)     Restless leg syndrome     Stroke ([facility] [address] 6/7/12    TIA    TIA (transient ischemic attack)       Past Surgical History:   Procedure Laterality Date    HX LUMBAR DISKECTOMY  january 2011    LAMINECTOMY,LUMBAR      LITHOTRIPSY        Social History   Substance Use Topics    Smoking status: Never Smoker    Smokeless tobacco: Not on file    Alcohol use No      Family History   Problem Relation Age of Onset    Heart Failure Mother     Stroke Mother     Heart Failure Father       Prior to Admission medications    Medication Sig Start Date End Date Taking? Authorizing Provider   rOPINIRole (REQUIP) 0.5 mg tablet Take 0.5 mg by mouth three (3) times daily. Haider Salamanca MD   multivitamin (ONE A DAY) tablet Take 1 Tab by mouth daily. Haider Salamanca MD   potassium 99 mg tablet Take 99 mg by mouth daily. MD JOYCE Rollins MD OTHER Phys Other, MD   cyanocobalamin (VITAMIN B12) 1,000 mcg/mL injection 1 mL by IntraMUSCular route daily. 5/17/14   Jorge Puri MD   cyanocobalamin (VITAMIN B-12) 1,000 mcg tablet Take 1 Tab by mouth daily. 5/16/14   Jorge Puri MD   Aspirin, Buffered 81 mg tab Take 81 mg by mouth. Haider Salamanca MD   metoprolol (LOPRESSOR) 25 mg tablet Take 0.5 Tabs by mouth every twelve (12) hours. 4/24/14   Lorena Barragan MD   diazepam (VALIUM) 5 mg tablet Take 2.5 mg by mouth two (2) times a day.     Historical Provider tamsulosin (FLOMAX) 0.4 mg capsule Take 0.4 mg by mouth daily. Historical Provider   darifenacin (ENABLEX) 7.5 mg ER tablet Take 7.5 mg by mouth daily. Historical Provider   FLUoxetine (PROZAC) 40 mg capsule Take 40 mg by mouth daily. Historical Provider   pramipexole (MIRAPEX) 1.5 mg tablet Take 1.5 mg by mouth three (3) times daily. Historical Provider   METFORMIN HCL (METFORMIN PO) Take 500 mg by mouth Before breakfast, lunch, and dinner. Historical Provider   OMEPRAZOLE (PRILOSEC PO) Take  by mouth. Historical Provider   atorvastatin (LIPITOR) 80 mg tablet Take 40 mg by mouth daily. Historical Provider   sitaGLIPtin (JANUVIA) 50 mg tablet Take 50 mg by mouth daily. Historical Provider   lisinopril (PRINIVIL, ZESTRIL) 2.5 mg tablet Take  by mouth daily.     Historical Provider     Current Facility-Administered Medications   Medication Dose Route Frequency    ybvi-nyqycfj-zlw-hydrocort (CORTISPORIN) 3.5-400-10,000 mg-unit/g-1% ointment   Both Eyes BID    vancomycin (VANCOCIN) 750 mg in 0.9% sodium chloride (MBP/ADV) 250 mL ADV  750 mg IntraVENous Q24H    VANCOMYCIN INFORMATION NOTE   Other ONCE    NOREPINephrine (LEVOPHED) 8,000 mcg in dextrose 5% 250 mL infusion  2-16 mcg/min IntraVENous TITRATE    heparin (porcine) injection 5,000 Units  5,000 Units SubCUTAneous Q12H    sodium bicarbonate (8.4%) 50 mEq in dextrose 5% 1,000 mL infusion   IntraVENous CONTINUOUS    sodium chloride (NS) flush 10-40 mL  10-40 mL InterCATHeter Q8H    piperacillin-tazobactam (ZOSYN) 3.375 g in  mL MBP##EXTENDED INFUSION  3.375 g IntraVENous Q8H    levoFLOXacin (LEVAQUIN) 750 mg in D5W IVPB  750 mg IntraVENous Q48H    dexmedetomidine (PRECEDEX) 400 mcg in 0.9% sodium chloride 100 mL infusion  0.2-0.7 mcg/kg/hr IntraVENous TITRATE    pantoprazole (PROTONIX) 40 mg in sodium chloride 0.9 % 10 mL injection  40 mg IntraVENous DAILY    chlorhexidine (PERIDEX) 0.12 % mouthwash 10 mL  10 mL Oral Q12H  insulin lispro (HUMALOG) injection   SubCUTAneous Q6H       Lines: All central lines examined by me. No signs of erythema, induration, discharge.       Peripheral Intravenous Line:  Peripheral IV 17 Left Wrist (Active)   Site Assessment Clean, dry, & intact 3/6/2017  3:30 PM   Phlebitis Assessment 0 3/6/2017  3:30 PM   Infiltration Assessment 0 3/6/2017  3:30 PM   Dressing Status Clean, dry, & intact 3/6/2017  3:30 PM       Peripheral IV 17 Right Antecubital (Active)   Site Assessment Clean, dry, & intact 3/6/2017  3:34 PM   Phlebitis Assessment 0 3/6/2017  3:34 PM   Infiltration Assessment 0 3/6/2017  3:34 PM   Dressing Status Clean, dry, & intact 3/6/2017  3:34 PM       Peripheral IV 17 Left Antecubital (Active)   Site Assessment Clean, dry, & intact 3/6/2017  3:37 PM   Phlebitis Assessment 0 3/6/2017  3:37 PM   Infiltration Assessment 0 3/6/2017  3:37 PM   Dressing Status Clean, dry, & intact 3/6/2017  3:37 PM      Airway:  Airway - Endotracheal Tube 17 Oral (Active)   Insertion Depth (cm) 22 cm 3/6/2017  3:55 PM   Line Lucas Lips 3/6/2017  3:55 PM   Side Secured Centered 3/6/2017  3:55 PM   Site Assessment Clean, dry, & intact 3/6/2017  3:55 PM       Telemetry:normal sinus rhythm    Objective:   Vital Signs:    Visit Vitals    /68    Pulse (!) 59    Temp 97.5 °F (36.4 °C)    Resp 14    Ht 6' 2\" (1.88 m)    Wt 71.9 kg (158 lb 8.2 oz)    SpO2 100%    BMI 20.35 kg/m2       O2 Device: Endotracheal tube, Ventilator       Temp (24hrs), Av.8 °F (36.6 °C), Min:97 °F (36.1 °C), Max:98.4 °F (36.9 °C)       Intake/Output:   Last shift:      701 -  190  In: 100 [I.V.:100]  Out: -     Last 3 shifts: 1901 -  07  In: 7000 [I.V.:5610]  Out: 2295 [Urine:2295]      Intake/Output Summary (Last 24 hours) at 17 1034  Last data filed at 17 0711   Gross per 24 hour   Intake           4060.6 ml   Output             1325 ml   Net           2735.6 ml Last 3 Recorded Weights in this Encounter    03/06/17 2100 03/07/17 1959 03/09/17 0635   Weight: 65.5 kg (144 lb 6.4 oz) 65.5 kg (144 lb 6.4 oz) 71.9 kg (158 lb 8.2 oz)       Ventilator Settings:  Mode Rate Tidal Volume Pressure FiO2 PEEP   Assist control, VC+   450 ml    30 % 5 cm H20     Peak airway pressure: 12 cm H2O    Plateau pressure:     Tidal volume:    Minute ventilation: 7 l/min   SPO2      ARDS network Guidelines: Lung protective strategy and Plateau pressure goals less than or equal to 30      Physical Exam:     General/Neurology: Intubated, off precedex, moving all extremities, PERRL, awake and alert, attempting to [facility], appears older than stated age  Head:   Temporal wasting  Eye:   No scleral icterus  Nose:   No erythema  Throat:  ETT in place, OGT  Lung:   Adequate air entry bilateral equal, no wheeze/rhonchi  Heart:   [place], no m  Abdomen:  Soft, ND, +BS, no masses, no organomegaly  :  Smith in place, clear yellow urine  Extremities:  No pedal edema, no cyanosis   Musculoskeletal: No joint swelling or tenderness.   Skin:   Warm and dry      Data:       Recent Results (from the past 24 hour(s))   GLUCOSE, POC    Collection Time: 03/08/17 12:18 PM   Result Value Ref Range    Glucose (POC) 112 (H) 70 - 110 mg/dL   POTASSIUM    Collection Time: 03/08/17  5:40 PM   Result Value Ref Range    Potassium 3.0 (L) 3.5 - 5.5 mmol/L   GLUCOSE, POC    Collection Time: 03/08/17  5:48 PM   Result Value Ref Range    Glucose (POC) 108 70 - 110 mg/dL   GLUCOSE, POC    Collection Time: 03/09/17 12:22 AM   Result Value Ref Range    Glucose (POC) 158 (H) 70 - 110 mg/dL   GLUCOSE, POC    Collection Time: 03/09/17 12:34 AM   Result Value Ref Range    Glucose (POC) 144 (H) 70 - 110 mg/dL   CBC WITH AUTOMATED DIFF    Collection Time: 03/09/17  3:52 AM   Result Value Ref Range    WBC 7.7 4.6 - 13.2 K/uL    RBC 3.39 (L) 4.70 - 5.50 M/uL    HGB 11.1 (L) 13.0 - 16.0 g/dL    HCT 33.8 (L) 36.0 - 48.0 %    MCV 99.7 (H) 74.0 - 97.0 FL    MCH 32.7 24.0 - 34.0 PG    MCHC 32.8 31.0 - 37.0 g/dL    RDW 13.9 11.6 - 14.5 %    PLATELET 734 640 - 773 K/uL    MPV 11.0 9.2 - 11.8 FL    NEUTROPHILS 72 40 - 73 %    LYMPHOCYTES 16 (L) 21 - 52 %    MONOCYTES 7 3 - 10 %    EOSINOPHILS 5 0 - 5 %    BASOPHILS 0 0 - 2 %    ABS. NEUTROPHILS 5.5 1.8 - 8.0 K/UL    ABS. LYMPHOCYTES 1.3 0.9 - 3.6 K/UL    ABS. MONOCYTES 0.5 0.05 - 1.2 K/UL    ABS. EOSINOPHILS 0.4 0.0 - 0.4 K/UL    ABS. BASOPHILS 0.0 0.0 - 0.06 K/UL    DF AUTOMATED     METABOLIC PANEL, COMPREHENSIVE    Collection Time: 03/09/17  3:52 AM   Result Value Ref Range    Sodium 150 (H) 136 - 145 mmol/L    Potassium 3.7 3.5 - 5.5 mmol/L    Chloride 121 (H) 100 - 108 mmol/L    CO2 22 21 - 32 mmol/L    Anion gap 7 3.0 - 18 mmol/L    Glucose 113 (H) 74 - 99 mg/dL    BUN 35 (H) 7.0 - 18 MG/DL    Creatinine 1.45 (H) 0.6 - 1.3 MG/DL    BUN/Creatinine ratio 23 (H) 12 - 20      GFR est AA 57 (L) >60 ml/min/1.73m2    GFR est non-AA 47 (L) >60 ml/min/1.73m2    Calcium 7.5 (L) 8.5 - 10.1 MG/DL    Bilirubin, total 0.8 0.2 - 1.0 MG/DL    ALT (SGPT) 40 16 - 61 U/L    AST (SGOT) 48 (H) 15 - 37 U/L    Alk.  phosphatase 79 45 - 117 U/L    Protein, total 5.0 (L) 6.4 - 8.2 g/dL    Albumin 2.0 (L) 3.4 - 5.0 g/dL    Globulin 3.0 2.0 - 4.0 g/dL    A-G Ratio 0.7 (L) 0.8 - 1.7     SODIUM, UR, RANDOM    Collection Time: 03/09/17  4:00 AM   Result Value Ref Range    Sodium urine, random 107 20 - 110 MMOL/L   CREATININE, UR, RANDOM    Collection Time: 03/09/17  4:00 AM   Result Value Ref Range    Creatinine, urine 52.90 30 - 125 mg/dL   GLUCOSE, POC    Collection Time: 03/09/17  6:09 AM   Result Value Ref Range    Glucose (POC) 118 (H) 70 - 110 mg/dL   POC G3    Collection Time: 03/09/17  8:49 AM   Result Value Ref Range    Device: VENT      FIO2 (POC) 0.30 %    pH (POC) 7.389 7.35 - 7.45      pCO2 (POC) 32.4 (L) 35.0 - 45.0 MMHG    pO2 (POC) 144 (H) 80 - 100 MMHG    HCO3 (POC) 19.6 (L) 22 - 26 MMOL/L    sO2 (POC) 99 (H) 92 - 97 % Base deficit (POC) 5 mmol/L    Mode ASSIST CONTROL      Tidal volume 450 ml    Set Rate 10 bpm    PEEP/CPAP (POC) 5 cmH2O    PIP (POC) 11      Allens test (POC) YES      Inspiratory Time 1.0 sec    Total resp. rate 13      Site LEFT RADIAL      Patient temp. 98.4      Specimen type (POC) ARTERIAL      Performed by [facility] Records     Volume control plus YES           Chemistry Recent Labs      03/09/17   0352  03/08/17   1740  03/08/17   0300  03/07/17   0700  03/07/17   0335  03/06/17   1520   GLU  113*   --   136*   --   121*  157*   NA  150*   --   155*  162*  161*  156*   K  3.7  3.0*  2.9*   --   3.8  4.4   CL  121*   --   125*   --   133*  125*   CO2  22   --   19*   --   19*  17*   BUN  35*   --   53*   --   81*  87*   CREA  1.45*   --   2.16*   --   2.59*  3.24*   CA  7.5*   --   7.7*   --   7.9*  9.8   MG   --    --   2.3   --    --   3.0*   PHOS   --    --   1.7*   --    --    --    AGAP  7   --   11   --   9  14   BUCR  23*   --   25*   --   31*  27*   AP  79   --   69   --    --   103   TP  5.0*   --   4.9*   --    --   7.0   ALB  2.0*   --   2.1*   --    --   3.1*   GLOB  3.0   --   2.8   --    --   3.9   AGRAT  0.7*   --   0.8   --    --   0.8        Lactic Acid No results found for: LAC  No results for input(s): LAC in the last 72 hours. Liver Enzymes Protein, total   Date Value Ref Range Status   03/09/2017 5.0 (L) 6.4 - 8.2 g/dL Final     Albumin   Date Value Ref Range Status   03/09/2017 2.0 (L) 3.4 - 5.0 g/dL Final     Globulin   Date Value Ref Range Status   03/09/2017 3.0 2.0 - 4.0 g/dL Final     A-G Ratio   Date Value Ref Range Status   03/09/2017 0.7 (L) 0.8 - 1.7   Final     AST (SGOT)   Date Value Ref Range Status   03/09/2017 48 (H) 15 - 37 U/L Final     Alk.  phosphatase   Date Value Ref Range Status   03/09/2017 79 45 - 117 U/L Final     Recent Labs      03/09/17   0352  03/08/17   0300  03/06/17   1520   TP  5.0*  4.9*  7.0   ALB  2.0*  2.1*  3.1*   GLOB  3.0  2.8  3.9   AGRAT  0.7* 0.8  0.8   SGOT  48*  36  43*   AP  79  69  103        CBC w/Diff Recent Labs      03/09/17   0352  03/08/17   0300  03/07/17   0335  03/06/17   1520   WBC  7.7  8.7  9.4  10.0   RBC  3.39*  3.36*  3.81*  4.26*   HGB  11.1*  10.8*  12.2*  14.0   HCT  33.8*  33.8*  38.7  43.3   PLT  151  149  177  232   GRANS  72  72   --   80*   LYMPH  16*  19*   --   14*   EOS  5  2   --   0        Cardiac Enzymes No results found for: CPK, CKMMB, CKMB, RCK3, CKMBT, CKNDX, CKND1, FABRICIO, TROPT, TROIQ, LISBETH, TROPT, TNIPOC, BNP, BNPP     BNP No results found for: BNP, BNPP, XBNPT     Coagulation Recent Labs      03/08/17   0300  03/07/17   1015   PTP  17.0*  16.9*   INR  1.4*  1.4*         Thyroid  Lab Results   Component Value Date/Time    T4, Total 7.8 04/22/2014 08:25 AM    TSH 3.08 03/06/2017 03:20 PM       Lab Results   Component Value Date/Time    T4, Total 7.8 04/22/2014 08:25 AM        Lipid Panel Lab Results   Component Value Date/Time    Cholesterol, total 135 06/08/2012 05:25 AM    HDL Cholesterol 34 06/08/2012 05:25 AM    LDL, calculated 80.8 06/08/2012 05:25 AM    VLDL, calculated 20.2 06/08/2012 05:25 AM    Triglyceride 101 06/08/2012 05:25 AM    CHOL/HDL Ratio 4.0 06/08/2012 05:25 AM        ABG Recent Labs      03/09/17   0849  03/08/17   0846  03/07/17   0811   PHI  7.389  7.363  7.289*   PCO2I  32.4*  33.0*  30.0*   PO2I  144*  151*  136*   HCO3I  19.6*  18.7*  14.4*   FIO2I  0.30  30  30        Urinalysis Lab Results   Component Value Date/Time    Color DARK YELLOW 03/06/2017 03:38 PM    Appearance TURBID 03/06/2017 03:38 PM    Specific gravity 1.020 03/06/2017 03:38 PM    pH (UA) >8.5 03/06/2017 03:38 PM    Protein 300 03/06/2017 03:38 PM    Glucose NEGATIVE  03/06/2017 03:38 PM    Ketone TRACE 03/06/2017 03:38 PM    Bilirubin NEGATIVE  03/06/2017 03:38 PM    Urobilinogen 1.0 03/06/2017 03:38 PM    Nitrites NEGATIVE  03/06/2017 03:38 PM    Leukocyte Esterase LARGE 03/06/2017 03:38 PM    Epithelial cells FEW 03/06/2017 03:38 PM    Bacteria 4+ 03/06/2017 03:38 PM    WBC TOO NUMEROUS TO COUNT 03/06/2017 03:38 PM    RBC TOO NUMEROUS TO COUNT 03/06/2017 03:38 PM        Micro  Recent Labs      03/06/17   1550  03/06/17   1538  03/06/17   1520   CULT  NO GROWTH 3 DAYS  >100,000  COLONIES/mL  PROTEUS MIRABILIS  *  NO GROWTH 3 DAYS     Recent Labs      03/06/17   1550  03/06/17   1538  03/06/17   1520   CULT  NO GROWTH 3 DAYS  >100,000  COLONIES/mL  PROTEUS MIRABILIS  *  NO GROWTH 3 DAYS        EKG  No results found for this or any previous visit. PFT  No flowsheet data found. Other  ASA reactivity:   Pre-albumin:   Ionized Calcium:   NH4:   T3, FT4:  Cortisol:  Urine Osm:  Urine Lytes:   HbA1c:      Ultrasound       LE Doppler       ECHO  3/7/17  Left ventricle: Systolic function was mildly reduced by visual assessment. Ejection fraction was estimated to be 50 %. There was possible hypokinesis  of anterior wall. Doppler parameters were consistent with abnormal left  ventricular relaxation (grade 1 diastolic dysfunction). Right ventricle: Systolic function was normal.    Aortic valve: There was no stenosis. Tricuspid valve: There was mild regurgitation. Inferior vena cava, hepatic veins: The inferior vena cava was dilated. Unable to evaluate inspiratory variation due to intubation. CT (Most Recent)    Results from Hospital Encounter encounter on 03/06/17   CT HEAD WO CONT   Narrative EXAM: CT head    INDICATION: Altered mental status    COMPARISON: Several prior exams, most recently 12/24/2016    TECHNIQUE: Axial CT imaging of the head was performed without intravenous  contrast.    One or more dose reduction techniques were used on this CT: automated exposure  control, adjustment of the mAs and/or kVp according to patient's size, and  iterative reconstruction techniques. The specific techniques utilized on this CT  exam have been documented in the patient's electronic medical record. _______________    FINDINGS: Examination detail is mildly limited by motion artifact. BRAIN AND POSTERIOR FOSSA: There is mild cortical and cerebellar volume loss  present, the appearance of which is similar to prior examinations. There is a  stable appearance to the ventricular size and configuration. The basilar  cisterns are patent. Mild subcortical and periventricular white matter  hypoattenuation is unchanged. Physiologic bilateral basal ganglia calcifications  are present. There is no intracranial hemorrhage, mass effect, or midline shift. There are no areas of abnormal parenchymal attenuation. EXTRA-AXIAL SPACES AND MENINGES: There are no abnormal extra-axial fluid  collections. CALVARIUM: Intact. SINUSES: There is mild mucosal thickening of the anterior and posterior ethmoid  air cells. OTHER: Atherosclerotic calcification of bilateral carotid siphons noted. _______________         Impression IMPRESSION:      1. Mildly motion limited examination, without evidence of acute intracranial  abnormality. Of note, noncontrast head CT can be normal in the context of early  acute stroke. 2. Unchanged subcortical and periventricular white matter hypoattenuation, a  nonspecific finding likely pertaining to chronic ischemic microvascular change. 3. Minimal, nonspecific mucosal thickening of the anterior and posterior ethmoid  air cells. XR (Most Recent). CXR  reviewed by me and compared with previous CXR   Results from Hospital Encounter encounter on 03/06/17   XR ABD PORT  1 V   Narrative Indication: OG tube placement    Comparison:  03/08/17    Findings: A single AP view of the lower chest and upper abdomen was performed. The bowel gas pattern is normal.  No organomegaly or abnormal soft tissue masses  are present. No gross free air is demonstrated. The OG tube tip is in the left  upper quadrant within the stomach.   The endotracheal catheter tip is  approximately 6 cm above the sumit. A right arm PICC line is present with the  catheter tip at the cavoatrial junction. Impression IMPRESSION:    OG tube tip appears to be within the stomach           CXR 3/9/17  1. Stable support lines and tubes as above. 2. No significant interval change or acute abnormality since the previous days  exam.      Best practice : All below core measures reviewed and addressed:   [x] Antibiotic choice. [x] Severe Sepsis bundles follwed; SIRS screen met? Yes  [x] Fluids. [x] Lactic acid ordered- initial and repeat Q6hrs if elevated till normalized. [x] Cultures drawn. [x] Antibiotic administered within 1hr-ICU and 3hrs ED. [x] Large bore IV- 2 sites       PICC   [x] Pressors aim MAP >65mmHg. [x] Steroids. [x] Glycemic control. [x] Infection control. [x] Mech. Ventilated patients- aim to keep peak plateau pressure 37-89LF H2O. [x] HOB at >= 30 degree. [x] Stress ulcer prophylaxis. [x] DVT prophylaxis. [x] Central Line Bundle Followed. [x] Smith Bundle Followed. [x] Ventilator Bundle Followed. (Daily sedation holiday to assess readiness for weaning from ventilator & SBT trial starting at 6.00 am, HOB 30-degree elevation, Chlorhexidine mouth washes & Routine oral care every 4 hours, [place] tube to suction at 20-30 cm H2O, Maintain [place] tube with 5-10ml air every 4 hours, DVT prophylaxis, GI prophylaxis etc.).     The patient is: [] acutely ill Risk of deterioration: [] moderate    [x] critically ill  [x] high     [x]See my orders for details    My assessment, plan of care, findings, medications, side effects etc were discussed with:  [x] Nurse [] PT/OT    [x] Respiratory therapy [x] Dr. Lucretia Strickland   [] Pharmacist [x] MDR   [] Family: answered all questions to satisfaction [] Patient: answered all questions to satisfaction   [x]  [x]      [x] Case & management strategies discussed today on multidisciplinary rounds    High complexity decision making was performed during the evaluation of this patient at high risk for decompensation with multiple organ involvement      Sanjay Danielma  3/9/2017

## 2017-03-09 NOTE — PROGRESS NOTES
RENAL PROGRESS NOTE        Bethany Lidrake         Assessment/Plan:     REID on CKD3: nonoliguric. Creat baseline now. Uroseptic shock: per PCCM. Keep MAP >65 mmHg. HyperNa: correcting with hypotonic IVF. Met Acidosis: better on IV bicarb. HypoK: replace per protocol. Resp failure: per PCCM. Oxygenating OK on vent. Subjective:  Patient complaints of:   vent    Patient Active Problem List   Diagnosis Code    CVA (cerebral infarction) I63.9    DM (diabetes mellitus) ([facility] E11.9    DM (diabetes mellitus) ([facility] E11.9    HLD (hyperlipidemia) E78.5    TIA (transient ischemic attack) G45.9    Herniated disc [id number]    Hypertensive heart disease I11.9    Screening for colon cancer Z12.11    Fall W19. Warren Roll Depression F32.9    Laceration of eyebrow, right S01.111A    Facial contusion S00.83XA    Respiratory failure (HCC) J96.90    Sepsis (HCC) A41.9    Encephalopathy acute G93.40    Debility R53.81       Current Facility-Administered Medications   Medication Dose Route Frequency Provider Last Rate Last Dose    potassium phosphate 20 mmol in dextrose 5% 250 mL infusion   IntraVENous ONCE JOELLE Alaniz        ELECTROLYTE REPLACEMENT PROTOCOL  1 Each Other PRN Casi Phillips MD        ELECTROLYTE REPLACEMENT PROTOCOL  1 Each Other PRN Casi Phillips MD        ELECTROLYTE REPLACEMENT PROTOCOL  1 Each Other PRN Casi Phillips MD        qvlu-zqbrabk-emq-hydrocort (CORTISPORIN) 3.5-400-10,000 mg-unit/g-1% ointment   Both Eyes BID Adan Gonzalez, [facility]        ELECTROLYTE REPLACEMENT PROTOCOL  1 Each Other PRN Nicole Conn MD        heparin (porcine) injection 5,000 Units  5,000 Units SubCUTAneous Q12H Lars Shannon MD   5,000 Units at 03/09/17 1013    sodium bicarbonate (8.4%) 50 mEq in dextrose 5% 1,000 mL infusion   IntraVENous CONTINUOUS Mike Santana  mL/hr at 03/08/17 1122      bacitracin 500 unit/gram packet 1 Packet  1 Packet Topical PRN Mariette Dancer, MD        sodium chloride (NS) flush 10 mL  10 mL InterCATHeter PRN Mariette Dancer, MD        sodium chloride (NS) flush 10-40 mL  10-40 mL InterCATHeter Q8H Mariette Dancer, MD   10 mL at 03/08/17 2244    piperacillin-tazobactam (ZOSYN) 3.375 g in  mL MBP##EXTENDED INFUSION  3.375 g IntraVENous Jassi Bella MD 25 mL/hr at 03/09/17 0711 3.375 g at 03/09/17 0711    acetaminophen (TYLENOL) suppository 650 mg  650 mg Rectal Q4H PRN Juana Berry MD   650 mg at 03/08/17 0015    dexmedetomidine (PRECEDEX) 400 mcg in 0.9% sodium chloride 100 mL infusion  0.2-0.7 mcg/kg/hr IntraVENous TITRATE Dory Da Silva MD   Stopped at 03/08/17 1121    pantoprazole (PROTONIX) 40 mg in sodium chloride 0.9 % 10 mL injection  40 mg IntraVENous DAILY Blanca Gomez MD   40 mg at 03/09/17 1013    chlorhexidine (PERIDEX) 0.12 % mouthwash 10 mL  10 mL Oral Q12H JOELLE Osborne   10 mL at 03/09/17 1014    albuterol-ipratropium (DUO-NEB) 2.5 MG-0.5 MG/3 ML  3 mL Nebulization Q6H PRN JOELLE Osborne        insulin lispro (HUMALOG) injection   SubCUTAneous Elicia Ortiz MD   Stopped at 03/09/17 0600    glucose chewable tablet 16 g  4 Tab Oral PRN Juana Berry MD        glucagon (GLUCAGEN) injection 1 mg  1 mg IntraMUSCular PRN Juana Berry MD        dextrose (D50W) injection syrg 12.5-25 g  25-50 mL IntraVENous PRN Juana Berry MD           Objective  Vitals:    03/09/17 0600 03/09/17 0635 03/09/17 0723 03/09/17 0800   BP: 133/68      Pulse: 60  (!) 59    Resp: 15  14    Temp: 98.4 °F (36.9 °C)   97.5 °F (36.4 °C)   SpO2: 100%  100%    Weight:  71.9 kg (158 lb 8.2 oz)     Height:  6' 2\" (1.88 m)           Intake/Output Summary (Last 24 hours) at 03/09/17 1122  Last data filed at 03/09/17 0711   Gross per 24 hour   Intake 3932.5 ml   Output             1285 ml   Net           2647.5 ml           Admission weight: Weight: 61.2 kg (135 lb) (03/06/17 1508)  Last Weight Metrics:  Weight Loss Metrics 3/9/2017 3/6/2016 5/13/2014 4/21/2014 1/20/2014 10/16/2013 8/30/2013   Today's Wt 158 lb 8.2 oz 225 lb 226 lb 230 lb 200 lb 210 lb 220 lb   BMI 20.35 kg/m2 28.88 kg/m2 29 kg/m2 29.52 kg/m2 25.67 kg/m2 26.95 kg/m2 28.23 kg/m2             Physical Assessment:     General: intubated. Neck: No jvd. LUNGS: Clear to Auscultation, No rales, rhonchi or wheezes. CVS EXM: S1, S2  RRR, no murmurs/gallops/rubs. Abdomen: soft, non tender. Lower Extremities:  no edema. Lab    CBC w/Diff Recent Labs      03/09/17   0352  03/08/17   0300  03/07/17   0335  03/06/17   1520   WBC  7.7  8.7  9.4  10.0   RBC  3.39*  3.36*  3.81*  4.26*   HGB  11.1*  10.8*  12.2*  14.0   HCT  33.8*  33.8*  38.7  43.3   PLT  151  149  177  232   GRANS  72  72   --   80*   LYMPH  16*  19*   --   14*   EOS  5  2   --   0        Chemistry Recent Labs      03/09/17   0352  03/08/17   1740  03/08/17   0300   03/07/17   0700  03/07/17   0335  03/06/17   1520   GLU  113*   --   136*   --    --   121*  157*   NA  150*   --   155*   --   162*  161*  156*   K  3.7  3.0*  2.9*   --    --   3.8  4.4   CL  121*   --   125*   --    --   133*  125*   CO2  22   --   19*   --    --   19*  17*   BUN  35*   --   53*   --    --   81*  87*   CREA  1.45*   --   2.16*   --    --   2.59*  3.24*   CA  7.5*   --   7.7*   --    --   7.9*  9.8   AGAP  7   --   11   --    --   9  14   BUCR  23*   --   25*   --    --   31*  27*   AP  79   --   69   --    --    --   103   TP  5.0*   --   4.9*   --    --    --   7.0   ALB  2.0*   --   2.1*   --    --    --   3.1*   GLOB  3.0   --   2.8   --    --    --   3.9   AGRAT  0.7*   --   0.8   --    --    --   0.8   PHOS  1.4*   --   1.7*   < >   --    --    --     < > = values in this interval not displayed.          No results found for: IRON, FE, TIBC, IBCT, PSAT, FERR Lab Results   Component Value Date/Time    Calcium 7.5 03/09/2017 03:52 AM    Phosphorus 1.4 03/09/2017 03:52 AM        Iqra Grajeda MD  [facility]  Pager: [phone]

## 2017-03-09 NOTE — DIABETES MGMT
NUTRITIONAL RE-ASSESSMENT AND  PLAN OF CARE     Timbo Morales           80 y.o.           3/6/2017                 1. Sepsis, due to unspecified organism ([address]    2. Unresponsive    3. Acute cystitis with hematuria    4. Debility    5. Encephalopathy acute    6. Respiratory failure, unspecified chronicity, unspecified whether with hypoxia or hypercapnia (HCC)         INTERVENTIONS/PLAN:   1. Suggest increasing Osmolite 1 bong tube feeding gradually as tolerated to initial goal rate of 75 ml/hr to provide 1908 calories, 80 grams protein, ~ 1.5 L free water/d. 2.  Monitor TF tolerance, labs and weights. ASSESSMENT:   Nutritional Status:  Pt is 76% ideal wt; BMI (calculated): 18.5 kg/m2 (underweight classification). Pt with thin appearance. Currently on vent with TF at 10 ml/hr via OGT. Labs noted. Diabetes Management:   [facility] records do not list any diabetes medications at this time. [facility] admissions indicate pt was once taking metformin and Januvia back in 2014. Continues to have good glycemic control. Recent blood glucose:   3/9/17:  158, 144, 118  3/8/17:  112, 108  Within target range (non-ICU: <140; ICU<180): [x] Yes   []  No    Current Insulin regimen: corrective lispro every 6 hours, normal insulin sensitivity   Home medication/insulin regimen: none per [facility] chart review. HbA1c:  6.2% - ave BG has been ~ 125 mg/dL over past 3 months. Adequate glycemic control PTA:  [x] Yes  [] No       SUBJECTIVE/OBJECTIVE:   Information obtained from: chart review, ICU rounds  [facility] records indicate pt was receiving a regular diet with thin liquids. Pt admitted from NH with unresponsiveness and shock, UTI, REID on CKD3; PMHx of HTN, DM, TIA and stroke. NH records also list hx of Parkinson's disease and dementia. Diet: NPO with Osmolite 1 bong at 10 ml/hr (254 calories, 11 grams protein, 202 ml free water/d). No data found.         Medications: [x] Reviewed   Pertinent:  Klor-con  IVF:  NaHCO3 50 mEq/L D5 at 100 ml/hr    Most Recent POC Glucose:   Recent Labs      03/09/17   0352  03/08/17   0300  03/07/17   0335  03/06/17   1520   GLU  113*  136*  121*  157*         Labs:   Lab Results   Component Value Date/Time    Hemoglobin A1c 6.2 03/06/2017 03:15 PM     Lab Results   Component Value Date/Time    Sodium 150 03/09/2017 03:52 AM    Potassium 3.7 03/09/2017 03:52 AM    Chloride 121 03/09/2017 03:52 AM    CO2 22 03/09/2017 03:52 AM    Anion gap 7 03/09/2017 03:52 AM    Glucose 113 03/09/2017 03:52 AM    BUN 35 03/09/2017 03:52 AM    Creatinine 1.45 03/09/2017 03:52 AM    Calcium 7.5 03/09/2017 03:52 AM    Magnesium 2.1 03/09/2017 03:52 AM    Phosphorus 1.4 03/09/2017 03:52 AM    Albumin 2.0 03/09/2017 03:52 AM       Anthropometrics: IBW : 86.4 kg (190 lb 7.6 oz), % IBW (Calculated): 75.81 %, BMI (calculated): 20.3  Wt Readings from Last 1 Encounters:   03/09/17 71.9 kg (158 lb 8.2 oz)      Ht Readings from Last 1 Encounters:   03/09/17 6' 2\" (1.88 m)       Estimated Nutrition Needs: 2157 calories/d  86 grams protein   Fluid (ml): 2300 ml  Based on:   [x]          Actual BW    []          ABW   []            Adjusted BW        Nutrition Diagnoses:   Underweight due to inadequate oral food and beverage intake as evidenced by BMI of 18.5% and pt is 76% ideal wt. Inadequate oral food an beverage intake due to vent as evidenced by NPO orders/TF. Nutrition Interventions:  TF being advanced. Goal:   Weight maintenance (+/- 1-2 kg) by 3/17/17.          Nutrition Monitoring and Evaluation      []     Monitor po intake on meal rounds  [x]     Continue inpatient monitoring and intervention  [x]     Other:TF    Nutrition Risk:  [x]   High     []  Moderate    []  Minimal/Uncompromised    Shepard Collet, RD   LR pager [phone]

## 2017-03-09 NOTE — PROGRESS NOTES
0720: Bedside and Verbal shift change report given to Sveta Stein RN (oncoming nurse) by [facility] RN (offgoing nurse). Report included the following information SBAR, Kardex, ED Summary, Procedure Summary, Intake/Output, MAR, Accordion, Recent Results, Med Rec Status, Cardiac Rhythm NSR with 1st degree AV block and Alarm Parameters . 1300: Patient's TF increased from 10 ml/hr to 30 ml/hr per order. Residual wnl.     1630: Talked with patient's family at length & they are planning on coming tomorrow. 1855: Increased patient's TF from 30 ml/hr to 50 ml/hr. Residual wnl.     1915: Bedside and Verbal shift change report given to [facility], RN (oncoming nurse) by  (offgoing nurse). Report included the following information SBAR, Kardex, ED Summary, Procedure Summary, Intake/Output, MAR, Accordion, Recent Results, Med Rec Status, Cardiac Rhythm NSR with 1st degree AV block and Alarm Parameters .

## 2017-03-09 NOTE — PROGRESS NOTES
Bedside and Verbal shift change report given to Nikhil Rascon RN (oncoming nurse) by Ming Bryan RN (offgoing nurse). Report included the following information SBAR, Kardex, Procedure Summary, Intake/Output, MAR, Recent Results and Cardiac Rhythm Afib.

## 2017-03-09 NOTE — PROGRESS NOTES
Patient completed ROM as follows. RUE X 0    LUE X0  RLE X 10 heel slides flex extension/ adduction flex extension /ankle pumps flex extension    LLE X 10 heel slides flex extension /adduction flex extension/ ankle pumps flex extension        Pt participation was:    ( ) AROM/ AAROM    (x )  PROM    Pain level before FMP:unable to verbaize  Pain level after FMP: unable to veraize  Nonverbal Pain Scale:     (   x ) Alerted Nursing.  ( x   ) Call bell within patient reach. ( x   ) Pt resting in no apparent distress in bed.           Raúl Rain, Rehab Tech

## 2017-03-10 NOTE — PROGRESS NOTES
The patient is a long term resident of [facility] , discharge plan is ot return if he can be successfully extubated. Palliative care is to meet with the family today.   Elgin Wise RN

## 2017-03-10 NOTE — PROGRESS NOTES
Progress Note      Patient: Delmis Whatley               Sex: male          DOA: 3/6/2017       YOB: 1935      Age:  80 y.o.        LOS:  LOS: 4 days               Subjective:   Pt is awake and has opened his eyes . He is being slowly weaned from the ventilator pt has a uti and has grown out proteus. Pt continues on iv zosyn he is afebrile and his wbc is 7.8 . He continually need electrolyte replacement of phosphorus and potassium  His chest x ray is relatively clear.hids sputum culture gtrew out rare staph species and candida      Objective:      Visit Vitals    /59    Pulse 71    Temp 98.6 °F (37 °C)    Resp 18    Ht 6' 2\" (1.88 m)    Wt 71.9 kg (158 lb 8.2 oz)    SpO2 100%    BMI 20.35 kg/m2       Physical Exam:  Pt is awake and remains intubated when seen   Heart reg rate and rhythm   Lungs scattered rhonchi heard bilaterally   Abdomen soft and no masses felt   Neuro not obeying  Instructions . Lab/Data Reviewed:  CMP:   Lab Results   Component Value Date/Time     03/10/2017 04:00 AM    K 3.6 03/10/2017 04:00 AM     (H) 03/10/2017 04:00 AM    CO2 25 03/10/2017 04:00 AM    AGAP 7 03/10/2017 04:00 AM     (H) 03/10/2017 04:00 AM    BUN 22 (H) 03/10/2017 04:00 AM    CREA 1.16 03/10/2017 04:00 AM    GFRAA >60 03/10/2017 04:00 AM    GFRNA >60 03/10/2017 04:00 AM    CA 7.5 (L) 03/10/2017 04:00 AM    MG 1.8 03/10/2017 04:00 AM    PHOS 1.6 (L) 03/10/2017 04:00 AM     CBC:   Lab Results   Component Value Date/Time    WBC 7.8 03/10/2017 04:00 AM    HGB 11.2 (L) 03/10/2017 04:00 AM    HCT 33.9 (L) 03/10/2017 04:00 AM     03/10/2017 04:00 AM           Assessment/Plan     Active Problems:    Respiratory failure ([address] (3/6/2017)pt was intubated     Sepsis ([address] (3/6/2017)pt was hypotensive buthas been weaned off pressors      Encephalopathy acute (3/7/2017) pt is more alert      Debility (3/7/2017)        Plan:pt will be weaned as tolerated .  He was seen earlier in the day . Continue the zosyn . Will follow .

## 2017-03-10 NOTE — PROGRESS NOTES
RENAL PROGRESS NOTE        Jersey Berrios         Assessment/Plan:     REID on CKD3: Creat baseline now. Will sign off. Uroseptic shock: per PCCM. Keep MAP >65 mmHg. HyperNa: corrected. Met Acidosis: resolved. Stop IV bicarb. HypoK: replace per protocol. Resp failure: per PCCM. Oxygenating OK on vent. Subjective:  Patient complaints of:   vent    Patient Active Problem List   Diagnosis Code    CVA (cerebral infarction) I63.9    DM (diabetes mellitus) ([facility] E11.9    DM (diabetes mellitus) ([facility] E11.9    HLD (hyperlipidemia) E78.5    TIA (transient ischemic attack) G45.9    Herniated disc [id number]    Hypertensive heart disease I11.9    Screening for colon cancer Z12.11    Fall W19. Nohelia Skeens    Depression F32.9    Laceration of eyebrow, right S01.111A    Facial contusion S00.83XA    Respiratory failure (HCC) J96.90    Sepsis (HCC) A41.9    Encephalopathy acute G93.40    Debility R53.81       Current Facility-Administered Medications   Medication Dose Route Frequency Provider Last Rate Last Dose    potassium chloride (KLOR-CON) packet 20 mEq  20 mEq Oral DAILY Shy James MD   20 mEq at 03/10/17 2763    sodium phosphate 9 mmol in 0.9% sodium chloride 250 mL infusion  9 mmol IntraVENous ONCE Shy James MD   9 mmol at 03/10/17 0840    ELECTROLYTE REPLACEMENT PROTOCOL  1 Each Other PRN Magdi Suárez MD        ELECTROLYTE REPLACEMENT PROTOCOL  1 Each Other PRN Magdi Suárez MD        ELECTROLYTE REPLACEMENT PROTOCOL  1 Each Other PRN Magdi Suárez MD        tjcw-oyocgkg-onw-hydrocort (CORTISPORIN) 3.5-400-10,000 mg-unit/g-1% ointment   Both Eyes BID Marcin Boateng        ELECTROLYTE REPLACEMENT PROTOCOL  1 Each Other PRN Shy James MD        heparin (porcine) injection 5,000 Units  5,000 Units SubCUTAneous Q12H Gopal Apodaca Lucretia Strickland MD   5,000 Units at 03/09/17 2320    sodium bicarbonate (8.4%) 50 mEq in dextrose 5% 1,000 mL infusion   IntraVENous CONTINUOUS Nando Martinez MD 75 mL/hr at 03/09/17 1222      bacitracin 500 unit/gram packet 1 Packet  1 Packet Topical PRN Edward Cerrato MD        sodium chloride (NS) flush 10 mL  10 mL InterCATHeter PRN Edward Cerrato MD        sodium chloride (NS) flush 10-40 mL  10-40 mL InterCATHeter Q8H Edward Cerrato MD   10 mL at 03/09/17 2157    piperacillin-tazobactam (ZOSYN) 3.375 g in  mL MBP##EXTENDED INFUSION  3.375 g IntraVENous Miranda Meraz MD 25 mL/hr at 03/10/17 0636 3.375 g at 03/10/17 0636    acetaminophen (TYLENOL) suppository 650 mg  650 mg Rectal Q4H PRN Iris Salguero MD   650 mg at 03/08/17 0015    dexmedetomidine (PRECEDEX) 400 mcg in 0.9% sodium chloride 100 mL infusion  0.2-0.7 mcg/kg/hr IntraVENous TITRATE Lauro Benedict MD   Stopped at 03/08/17 1121    pantoprazole (PROTONIX) 40 mg in sodium chloride 0.9 % 10 mL injection  40 mg IntraVENous DAILY Kacy Melgar MD   40 mg at 03/10/17 0827    chlorhexidine (PERIDEX) 0.12 % mouthwash 10 mL  10 mL Oral Q12H Corean Boeck, PA   10 mL at 03/10/17 0826    albuterol-ipratropium (DUO-NEB) 2.5 MG-0.5 MG/3 ML  3 mL Nebulization Q6H PRN Corean Boeck, PA        insulin lispro (HUMALOG) injection   SubCUTAneous Q6H Iris Salguero MD   2 Units at 03/10/17 1598    glucose chewable tablet 16 g  4 Tab Oral PRN Iris Salguero MD        glucagon (GLUCAGEN) injection 1 mg  1 mg IntraMUSCular PRN Iris Salguero MD        dextrose (D50W) injection syrg 12.5-25 g  25-50 mL IntraVENous PRN Iris Salguero MD           Objective  Vitals:    03/10/17 0600 03/10/17 0700 03/10/17 0800 03/10/17 0901   BP: 109/74 130/73     Pulse: 66 66  69   Resp:    16   Temp: 98.2 °F (36.8 °C) 98.1 °F (36.7 °C) 98.1 °F (36.7 °C)    SpO2: 100% 100%  100%   Weight:       Height:             Intake/Output Summary (Last 24 hours) at 03/10/17 1017  Last data filed at 03/10/17 0827   Gross per 24 hour   Intake          3818.33 ml   Output             1095 ml   Net          2723.33 ml           Admission weight: Weight: 61.2 kg (135 lb) (03/06/17 1508)  Last Weight Metrics:  Weight Loss Metrics 3/9/2017 3/6/2016 5/13/2014 4/21/2014 1/20/2014 10/16/2013 8/30/2013   Today's Wt 158 lb 8.2 oz 225 lb 226 lb 230 lb 200 lb 210 lb 220 lb   BMI 20.35 kg/m2 28.88 kg/m2 29 kg/m2 29.52 kg/m2 25.67 kg/m2 26.95 kg/m2 28.23 kg/m2             Physical Assessment:     General: NAD, alert and oriented. Neck: No jvd. LUNGS: Clear to Auscultation, No rales, rhonchi or wheezes. CVS EXM: S1, S2  RRR, no murmurs/gallops/rubs. Abdomen: soft, non tender. Lower Extremities:  no edema. Lab    CBC w/Diff Recent Labs      03/10/17   0400  03/09/17   0352  03/08/17   0300   WBC  7.8  7.7  8.7   RBC  3.48*  3.39*  3.36*   HGB  11.2*  11.1*  10.8*   HCT  33.9*  33.8*  33.8*   PLT  161  151  149   GRANS   --   72  72   LYMPH   --   16*  19*   EOS   --   5  2        Chemistry Recent Labs      03/10/17   0400   03/09/17   1630  03/09/17   0352   03/08/17   0300   GLU  175*   --    --   113*   --   136*   NA  144   --    --   150*   --   155*   K  3.6   --   3.1*  3.7   < >  2.9*   CL  112*   --    --   121*   --   125*   CO2  25   --    --   22   --   19*   BUN  22*   --    --   35*   --   53*   CREA  1.16   --    --   1.45*   --   2.16*   CA  7.5*   --    --   7.5*   --   7.7*   AGAP  7   --    --   7   --   11   BUCR  19   --    --   23*   --   25*   AP   --    --    --   79   --   69   TP   --    --    --   5.0*   --   4.9*   ALB   --    --    --   2.0*   --   2.1*   GLOB   --    --    --   3.0   --   2.8   AGRAT   --    --    --   0.7*   --   0.8   PHOS  1.6*   < >   --   1.4*   --   1.7*    < > = values in this interval not displayed.          No results found for: IRON, FE, TIBC, IBCT, PSAT, FERR Lab Results   Component Value Date/Time    Calcium 7.5 03/10/2017 04:00 AM    Phosphorus 1.6 03/10/2017 04:00 AM        Audrey Méndez MD  [facility]  Pager: [phone]

## 2017-03-10 NOTE — PALLIATIVE CARE
T/C to pt's AMANUEL [employer]. He will not be in today but he reports that despite her illness pt's ex-wife, who is also MPOA, is flying in today, arriving in [facility] around noon. We will look for her early afternoon to meet with her and have DDNR signed.

## 2017-03-10 NOTE — PROGRESS NOTES
Bedside and Verbal shift change report given to Ga Xiong (oncoming nurse) by Bassem Shore RN   (offgoing nurse). Report included the following information SBAR, Kardex, Procedure Summary, Intake/Output, MAR, Recent Results and Cardiac Rhythm NSR/PVCs 1st degree AVB.

## 2017-03-10 NOTE — PROGRESS NOTES
Problem: Ventilator Management  Goal: *Normal spontaneous ventilation  Outcome: Progressing Towards Goal  Patient currently on spontaneous breathing trial

## 2017-03-10 NOTE — PROGRESS NOTES
[facility]: [phone]  [facility], located within [facility]: [phone]   [facility]: [phone]    Patient Name: Donald Auguste  YOB: 1935    Date of Initial Consult: 3/7/2017  Reason for Consult: care decisions  Requesting Provider: JOELLE Wright   Primary Care Physician: Ashli Cade MD      SUMMARY:   Donald Auguste is a 80y.o. year old with a past history of chronic back pain, depression, diabetes, hypertension, CVA, a fib, who was admitted on 3/6/2017 from [facility] with a diagnosis of shock, possible septic and cardiogenic . Current medical issues leading to Palliative Medicine involvement include: 80year old nursing home resident who was found unresponsive by NH staff, thought to be in cardiac arrest, 1 minute of CPR. Intubated in the field by EMS, in the ER found to be in septic shock, likely due to UTI. Transferred to ICU on ventilator, poor responsive, requiring pressor support. Palliative medicine is consulted to discuss care decisions. PALLIATIVE DIAGNOSES:   1. Acute respiratory failure  2. Shock septic   3. Acute encephalopathy  4. Debility        PLAN:   1. Racquel Day LCSW and I met patient at bedside, orally intubated on ventilator, but is tracking, following commands, to squeeze hand. When asked if in pain, indicated no by hand squeeze. 2. Goals of care and Code Status; as noted AMD had been done and his mPOAs are [employer] and ex wife Kishore Alcazar, who was planning to come today and meet with us. Unfortunately she has yet to arrive. Our goal was to have her sign the DDNR, as they had already decided to honor his wish to be a DNR, will leave a DDNR in chart and request ICU team to obtain signature. A POST would also be helpful prior to his return to [facility] but this requires more dialogue. 3. Shock-now off pressor support  4.  Acute encephalopathy; per yesterday's note that he is more alert, trying to follow commands and track. 5. Initial consult note routed to primary continuity provider  6.  Communicated plan of care with: Palliative IDT, ICU team, Care Manager       GOALS OF CARE:   [====Goals of Care====]  GOALS OF CARE:  Patient / health care proxy stated goals:       TREATMENT PREFERENCES:   Code Status: DNR    Advance Care Planning:  Advance Care Planning 3/7/2017   Patient's Healthcare Decision Maker is: Legal Next of Angeles Jimenez   Primary Decision Maker Name Mathew Cervantes   Primary Decision Maker Phone Number [phone] / [phone]   Primary Decision Maker Relationship to Patient (No Data)   Secondary Decision Maker Name Jason James   Secondary Decision Maker Phone Number [phone]   Secondary Decision Maker Relationship to Patient Friend   Confirm Advance Directive Yes, not on file       Other:    The palliative care team has discussed with patient / health care proxy about goals of care / treatment preferences for patient.  [====Goals of Care====]      Advance Care Planning 3/7/2017   Patient's Healthcare Decision Maker is: Legal Next of Angeles Jimenez   Primary Decision Maker Name Mathew Cervantes   Primary Decision Maker Phone Number [phone] / [phone]   Primary Decision Maker Relationship to Patient (No Data)   Secondary Decision Maker Name [address] [place] [address] Phone Number [phone]   Secondary Decision Maker Relationship to Patient Friend   Confirm Advance Directive Yes, not on file           HISTORY:     History obtained from: chart     CHIEF COMPLAINT: shock     HPI/SUBJECTIVE:    The patient is:   [] Verbal and participatory  [x] Non-participatory due to: oral intubation and poorly responsive   Please see summary   98.1, 66, 130/73 Intubated at 100%,  lb, Smith 1365, Stool 3-8  MAR-Nebs, Heparin, Protonix, Zosyn, Sodiium Bicarb,   LABS-WBC 7.8, H&H 11.2 & 33.9, Plat 161, INR 1.4, BUN/Creat 22/1.16, Albumin 2, TSH 3.08, Blood C&S NGTD, CXR-Endotracheal tube in good radiographic position. 2. Slightly high position of the nasogastric tube. Recommend advancement. 3. No acute cardiopulmonary disease. 3/6/2017 CT of head Mildly motion limited examination, without evidence of acute intracranial abnormality. Of note, noncontrast head CT can be normal in the context of early acute stroke. 2. Unchanged subcortical and periventricular white matter hypoattenuation, a nonspecific finding likely pertaining to chronic ischemic microvascular change. 3. Minimal, nonspecific mucosal thickening of the anterior and posterior ethmoid air cells. ECHO 3/7/2017  Left ventricle: Systolic function was mildly reduced by visual assessment. Ejection fraction was estimated to be 50 %. There was possible hypokinesis of anterior wall. Doppler parameters were consistent with abnormal left  ventricular relaxation (grade 1 diastolic dysfunction). Right ventricle: Systolic function was normal.  Aortic valve: There was no stenosis. Tricuspid valve: There was mild regurgitation. Inferior vena cava, hepatic veins: The inferior vena cava was dilated. Unable to evaluate inspiratory variation due to intubation.     Clinical Pain Assessment (nonverbal scale for nonverbal patients): Pain: 0  Adult Non-Verbal Pain Assessment    Face  [x] 0   No particular expression or smile  [] 1   Occasional grimace, tearing, frowning, wrinkled forehead  [] 2   Frequent grimace, tearing, frowning, wrinkled forehead    Activity (movement)  [x] 0   Lying quietly, normal position  [] 1   Seeking attention through movement or slow, cautious movement  [] 2   Restless, excessive activity and/or withdrawal reflexes    Guarding  [x] 0   Lying quietly, no positioning of hands over areas of body  [] 1   Splinting areas of the body, tense  [] 2   Rigid, stiff    Physiology (vital signs)  [x] 0   Stable vital signs  [] 1   Change in any of the following: SBP > 20mm Hg; HR > 20/minute  [] 2   Change in any of the following: SBP > 30mm Hg; HR > 25/minute    Respiratory  [x] 0   Baseline RR/SpO2, compliant with ventilator  [] 1   RR > 10 above baseline, or 5% drop SpO2, mild asynchrony with ventilator  [] 2   RR > 20 above baseline, or 10% drop SpO2, asynchrony with ventilator    Total Non-Verbal Pain Score: 0       FUNCTIONAL ASSESSMENT:     Palliative Performance Scale (PPS): 20%          PSYCHOSOCIAL/SPIRITUAL SCREENING:     Advance Care Planning:  Advance Care Planning 3/7/2017   Patient's Healthcare Decision Maker is: Legal Next of Angeles Jimenez   Primary Decision Maker Name Saleem Murrieta   Primary Decision Maker Phone Number [phone] / [phone]   Primary Decision Maker Relationship to Patient (No Data)   Secondary Decision Maker Name [address] [place] [address] Phone Number [phone]   Secondary Decision Maker Relationship to Patient Friend   Confirm Advance Directive Yes, not on file        Any spiritual / Zoroastrian concerns:  [] Yes /  [] No    Caregiver Burnout:  [] Yes /  [] No /  [x] No Caregiver Present      Anticipatory grief assessment:   [] Normal  / [] Maladaptive       ESAS Anxiety:      ESAS Depression:          REVIEW OF SYSTEMS:     Positive and pertinent negative findings in ROS are noted above in HPI. The following systems were [] reviewed / [x] unable to be reviewed as noted in HPI  Other findings are noted below. Systems: constitutional, ears/nose/mouth/throat, respiratory, gastrointestinal, genitourinary, musculoskeletal, integumentary, neurologic, psychiatric, endocrine. Positive findings noted below. Modified ESAS Completed by: provider           Pain: 0           Dyspnea: 0           Stool Occurrence(s): 1        PHYSICAL EXAM:     Wt Readings from Last 3 Encounters:   03/09/17 71.9 kg (158 lb 8.2 oz)   03/06/16 102.1 kg (225 lb)   05/13/14 102.5 kg (226 lb)     Blood pressure 130/73, pulse 66, temperature 98.1 °F (36.7 °C), resp.  rate 15, height 6' 2\" (1.88 m), weight 71.9 kg (158 lb 8.2 oz), SpO2 100 %. Pain:  Pain Scale 1: Visual  Pain Intensity 1: 0                     Constitutional: elderly ill appearing male who is orally intubated in ICU in no current acute distress eyes open and following command-to squeeze hand  ENMT: orally intubated   Respiratory: ventilator supported  Skin: warm, dry  Neurologic: moving extremities,  follow commands      HISTORY:     Active Problems:    Respiratory failure ([address] (3/6/2017)      Sepsis ([address] (3/6/2017)      Encephalopathy acute (3/7/2017)      Debility (3/7/2017)      Past Medical History:   Diagnosis Date    Chronic back pain     s/p Leminectomy and discectomy (2011)    Depression     Diabetes mellitus     Herniated disc     status post laminectomy and diskectomy surgery in January    Hyperlipidemia     Hypertension     Hypertensive heart disease     Kidney stones     Nephrocalcinosis     Other ill-defined conditions(799.89)     AFIB    PVC (premature ventricular contraction)     Restless leg syndrome     Stroke ([address] 6/7/12    TIA    TIA (transient ischemic attack)       Past Surgical History:   Procedure Laterality Date    HX LUMBAR DISKECTOMY  january 2011    LAMINECTOMY,LUMBAR      LITHOTRIPSY        Family History   Problem Relation Age of Onset    Heart Failure Mother     Stroke Mother     Heart Failure Father      History reviewed, no pertinent family history.   Social History   Substance Use Topics    Smoking status: Never Smoker    Smokeless tobacco: Not on file    Alcohol use No     Allergies   Allergen Reactions    Sulfa (Sulfonamide Antibiotics) Unknown (comments)    Sulfa (Sulfonamide Antibiotics) Unknown (comments)      Current Facility-Administered Medications   Medication Dose Route Frequency    potassium chloride (KLOR-CON) packet 20 mEq  20 mEq Oral DAILY    sodium phosphate 9 mmol in 0.9% sodium chloride 250 mL infusion  9 mmol IntraVENous ONCE    magnesium sulfate 1 g/100 ml IVPB (premix or compounded)  1 g IntraVENous ONCE    ELECTROLYTE REPLACEMENT PROTOCOL  1 Each Other PRN    ELECTROLYTE REPLACEMENT PROTOCOL  1 Each Other PRN    ELECTROLYTE REPLACEMENT PROTOCOL  1 Each Other PRN    smxf-hoctmgu-npm-hydrocort (CORTISPORIN) 3.5-400-10,000 mg-unit/g-1% ointment   Both Eyes BID    ELECTROLYTE REPLACEMENT PROTOCOL  1 Each Other PRN    heparin (porcine) injection 5,000 Units  5,000 Units SubCUTAneous Q12H    sodium bicarbonate (8.4%) 50 mEq in dextrose 5% 1,000 mL infusion   IntraVENous CONTINUOUS    bacitracin 500 unit/gram packet 1 Packet  1 Packet Topical PRN    sodium chloride (NS) flush 10 mL  10 mL InterCATHeter PRN    sodium chloride (NS) flush 10-40 mL  10-40 mL InterCATHeter Q8H    piperacillin-tazobactam (ZOSYN) 3.375 g in  mL MBP##EXTENDED INFUSION  3.375 g IntraVENous Q8H    acetaminophen (TYLENOL) suppository 650 mg  650 mg Rectal Q4H PRN    dexmedetomidine (PRECEDEX) 400 mcg in 0.9% sodium chloride 100 mL infusion  0.2-0.7 mcg/kg/hr IntraVENous TITRATE    pantoprazole (PROTONIX) 40 mg in sodium chloride 0.9 % 10 mL injection  40 mg IntraVENous DAILY    chlorhexidine (PERIDEX) 0.12 % mouthwash 10 mL  10 mL Oral Q12H    albuterol-ipratropium (DUO-NEB) 2.5 MG-0.5 MG/3 ML  3 mL Nebulization Q6H PRN    insulin lispro (HUMALOG) injection   SubCUTAneous Q6H    glucose chewable tablet 16 g  4 Tab Oral PRN    glucagon (GLUCAGEN) injection 1 mg  1 mg IntraMUSCular PRN    dextrose (D50W) injection syrg 12.5-25 g  25-50 mL IntraVENous PRN        LAB AND IMAGING FINDINGS:     Lab Results   Component Value Date/Time    WBC 7.8 03/10/2017 04:00 AM    HGB 11.2 03/10/2017 04:00 AM    PLATELET 020 84/02/3268 04:00 AM     Lab Results   Component Value Date/Time    Sodium 144 03/10/2017 04:00 AM    Potassium 3.6 03/10/2017 04:00 AM    Chloride 112 03/10/2017 04:00 AM    CO2 25 03/10/2017 04:00 AM    BUN 22 03/10/2017 04:00 AM    Creatinine 1.16 03/10/2017 04:00 AM    Calcium 7.5 03/10/2017 04:00 AM    Magnesium 1.8 03/10/2017 04:00 AM    Phosphorus 1.6 03/10/2017 04:00 AM      Lab Results   Component Value Date/Time    AST (SGOT) 48 03/09/2017 03:52 AM    Alk. phosphatase 79 03/09/2017 03:52 AM    Protein, total 5.0 03/09/2017 03:52 AM    Albumin 2.0 03/09/2017 03:52 AM    Globulin 3.0 03/09/2017 03:52 AM     Lab Results   Component Value Date/Time    INR 1.4 03/08/2017 03:00 AM    Prothrombin time 17.0 03/08/2017 03:00 AM    aPTT 32.5 05/13/2014 08:32 AM      No results found for: IRON, FE, TIBC, IBCT, PSAT, FERR   No results found for: PH, PCO2, PO2  No components found for: [place]   Lab Results   Component Value Date/Time     03/07/2017 07:45 PM    CK - MB 7.6 03/07/2017 07:45 PM              Total time:  20 minutes   Counseling / coordination time: 15 minutes   > 50% counseling / coordination?:     Prolonged service was provided for  []30 min   []75 min in face to face time in the presence of the patient. Time Start:   Time End:   Note: this can only be billed with 87127 (initial) or 87903 (follow up). If multiple start / stop times, list each separately.

## 2017-03-10 NOTE — PALLIATIVE CARE
We were informed that pt's ex-wife/MPOA would arrive between 2pm to 3pm. She has not arrived and we will now not be able to meet with her.

## 2017-03-10 NOTE — PROGRESS NOTES
[employer] [facility]  Pulmonary, Critical Care, and Sleep Medicine    Name: Maggie Velazquez MRN: [id number]   : 1935 Hospital: [facility]   Date: 3/10/2017        [facility] Progress Note    [x] I have reviewed the flowsheet and previous days notes. Events, vitals, medications and notes from last 24 hours reviewed. Care plan discussed with staff, patient, family and on multidisciplinary rounds. 3/10/2017  Pt much more awake and alert today, attempting to speak  Tracks, does not FC well  Tolerating SBT  HD stable and afebrile  Na and Cr much improved, tolerating TF    IMPRESSION:   · Shock septic, now off pressor support, improving  · Urosepsis, Ucx +proteus  · Acute respiratory failure requiring MV, possible aspiration, tolerating SBT  · Possible cardiac arrest, CPR performed by nursing facility  · Elevated troponin, EF 50%, cannot r/o hypokinesis of anterior wall, grade 1 DD  · Hypernatremia, hyperchloremia due to volume depletion, improving  · AfibRVR, rate controlled  · Metabolic acidosis, resolved  · Lactic acidosis, resolved  · REID on CKD, likely volume depletion, hypotension, improved  · AMS/acute encephalopathy, multifactorial due to above, metabolic/infectious, unknown baseline  · Hypokalemia, hypophosphatemia, being replaced  · Anemia, no active bleeding, component dilutional  · DM  · Hx HTN  · Hx TIA/CVA  · Hx RLS    · Code status: DNR      RECOMMENDATIONS:   · CVS: Monitor HD. Currently in afib. ECHO as below. PICC placed. Currently off pressor support. Enzymes trended down  · Respiratory: MV bundle/protocol. SBT today as MS improves, possible extubation soon. Follow CXR and ABG, adjust vent prn, keep O2 sat >95%. Sputum cx. Aspiration precautions. Duonebs, abx. HOB 30 degrees  · ID:  Sepsis bundle. Urine cx +proteus. Cont zosyn, d/c levaquin and vanc. Await blood cx: NGTD. LA normalized. cortisporin eye gtts  · Hematology/Oncology:  INR elevated slightly.  Hgb and plts stable  · Renal:  Follow BUN, Cr. Trend Na, Cl. IVF Monitor UO, sandoval for now. Renal signed off  · GI/:  PPI. Trend LFT's. · Endocrine:  Normal TSH. Follow FSG, SSI prn  · Neurology/Pain/Sedation:  Not requiring sedation. CT head negative for acute process. Continue neuro checks. · Musculoskeletal:  Normal CK  · Skin/Wound: Local preventative wound care  · FEN: Advance TF as tolerated. Replace lytes prn per protocol. IVF  · Prophylaxis: GI Prophylaxis with protonix. DVT Prophylaxis with heparin. · Restraints: B wrist  · Palliative care following, family coming today. ·                                                               · Glycemic Control. Glucose stabilizer per ICU protocol when on insulin drip. Maintain blood glucose 140-180. · Replace electrolytes per ICU electrolyte replacement protocol  · Ventilator bundle & Sedation protocol followed. Daily sedation holiday, assessment for readiness for SBT and then re-titrate if required. Chlorhexidine mouth washes. · HOB 30 degree elevation all the time. Aggressive pulmonary toileting. Incentive spirometry when appropriate. Aspiration precautions. · Sepsis bundle and protocol followed. Follow serial lactic acid, frequent BMP check, fluid resuscitation. Follow cultures. Deescalate antibiotic when appropriate. · Sandoval bundle followed, remove sandoval catheter when not critically ill. · Central Line bundle followed, remove when not needed. · Skin & Wound care. · Weekly prealbumin, nutritional consult. · PT/OT eval and treat. OOB when appropriate. · Further recommendations will be based on the patient's response to recommended treatment and results of the investigation ordered. · Quality Care: PPI, DVT prophylaxis, HOB elevated, Infection control all reviewed and addressed. · Events and notes from last 24 hours reviewed.  Care plan discussed with nursing     Review of Systems:  Review of systems not obtained due to patient factors. Allergies   Allergen Reactions    Sulfa (Sulfonamide Antibiotics) Unknown (comments)    Sulfa (Sulfonamide Antibiotics) Unknown (comments)      Past Medical History:   Diagnosis Date    Chronic back pain     s/p Leminectomy and discectomy (2011)    Depression     Diabetes mellitus     Herniated disc     status post laminectomy and diskectomy surgery in January    Hyperlipidemia     Hypertension     Hypertensive heart disease     Kidney stones     Nephrocalcinosis     Other ill-defined conditions(799.89)     AFIB    PVC (premature ventricular contraction)     Restless leg syndrome     Stroke ([facility] [address] 6/7/12    TIA    TIA (transient ischemic attack)       Past Surgical History:   Procedure Laterality Date    HX LUMBAR DISKECTOMY  january 2011    LAMINECTOMY,LUMBAR      LITHOTRIPSY        Social History   Substance Use Topics    Smoking status: Never Smoker    Smokeless tobacco: Not on file    Alcohol use No      Family History   Problem Relation Age of Onset    Heart Failure Mother     Stroke Mother     Heart Failure Father       Prior to Admission medications    Medication Sig Start Date End Date Taking? Authorizing Provider   rOPINIRole (REQUIP) 0.5 mg tablet Take 0.5 mg by mouth three (3) times daily. Haider Salamanca MD   multivitamin (ONE A DAY) tablet Take 1 Tab by mouth daily. Haider Salamanca MD   potassium 99 mg tablet Take 99 mg by mouth daily. MD JOYCE Rollins MD OTHER Phys Other, MD   cyanocobalamin (VITAMIN B12) 1,000 mcg/mL injection 1 mL by IntraMUSCular route daily. 5/17/14   Derick Thayer MD   cyanocobalamin (VITAMIN B-12) 1,000 mcg tablet Take 1 Tab by mouth daily. 5/16/14   Derick Thayer MD   Aspirin, Buffered 81 mg tab Take 81 mg by mouth. Haider Salamanca MD   metoprolol (LOPRESSOR) 25 mg tablet Take 0.5 Tabs by mouth every twelve (12) hours.  4/24/14   Shakira Victoria MD   diazepam (VALIUM) 5 mg tablet Take 2.5 mg by mouth two (2) times a day.    Historical Provider   tamsulosin (FLOMAX) 0.4 mg capsule Take 0.4 mg by mouth daily. Historical Provider   darifenacin (ENABLEX) 7.5 mg ER tablet Take 7.5 mg by mouth daily. Historical Provider   FLUoxetine (PROZAC) 40 mg capsule Take 40 mg by mouth daily. Historical Provider   pramipexole (MIRAPEX) 1.5 mg tablet Take 1.5 mg by mouth three (3) times daily. Historical Provider   METFORMIN HCL (METFORMIN PO) Take 500 mg by mouth Before breakfast, lunch, and dinner. Historical Provider   OMEPRAZOLE (PRILOSEC PO) Take  by mouth. Historical Provider   atorvastatin (LIPITOR) 80 mg tablet Take 40 mg by mouth daily. Historical Provider   sitaGLIPtin (JANUVIA) 50 mg tablet Take 50 mg by mouth daily. Historical Provider   lisinopril (PRINIVIL, ZESTRIL) 2.5 mg tablet Take  by mouth daily. Historical Provider     Current Facility-Administered Medications   Medication Dose Route Frequency    potassium chloride (KLOR-CON) packet 20 mEq  20 mEq Oral DAILY    sodium phosphate 9 mmol in 0.9% sodium chloride 250 mL infusion  9 mmol IntraVENous ONCE    0.45% sodium chloride with KCl 20 mEq/L infusion   IntraVENous CONTINUOUS    qgtp-lvzpgwr-hva-hydrocort (CORTISPORIN) 3.5-400-10,000 mg-unit/g-1% ointment   Both Eyes BID    heparin (porcine) injection 5,000 Units  5,000 Units SubCUTAneous Q12H    sodium chloride (NS) flush 10-40 mL  10-40 mL InterCATHeter Q8H    piperacillin-tazobactam (ZOSYN) 3.375 g in  mL MBP##EXTENDED INFUSION  3.375 g IntraVENous Q8H    pantoprazole (PROTONIX) 40 mg in sodium chloride 0.9 % 10 mL injection  40 mg IntraVENous DAILY    chlorhexidine (PERIDEX) 0.12 % mouthwash 10 mL  10 mL Oral Q12H    insulin lispro (HUMALOG) injection   SubCUTAneous Q6H       Lines: All central lines examined by me. No signs of erythema, induration, discharge.       Peripheral Intravenous Line:  Peripheral IV 03/06/17 Left Wrist (Active)   Site Assessment Clean, dry, & intact 3/6/2017  3:30 PM   Phlebitis Assessment 0 3/6/2017  3:30 PM   Infiltration Assessment 0 3/6/2017  3:30 PM   Dressing Status Clean, dry, & intact 3/6/2017  3:30 PM       Peripheral IV 17 Right Antecubital (Active)   Site Assessment Clean, dry, & intact 3/6/2017  3:34 PM   Phlebitis Assessment 0 3/6/2017  3:34 PM   Infiltration Assessment 0 3/6/2017  3:34 PM   Dressing Status Clean, dry, & intact 3/6/2017  3:34 PM       Peripheral IV 17 Left Antecubital (Active)   Site Assessment Clean, dry, & intact 3/6/2017  3:37 PM   Phlebitis Assessment 0 3/6/2017  3:37 PM   Infiltration Assessment 0 3/6/2017  3:37 PM   Dressing Status Clean, dry, & intact 3/6/2017  3:37 PM      Airway:  Airway - Endotracheal Tube 17 Oral (Active)   Insertion Depth (cm) 22 cm 3/6/2017  3:55 PM   Line Lucas Lips 3/6/2017  3:55 PM   Side Secured Centered 3/6/2017  3:55 PM   Site Assessment Clean, dry, & intact 3/6/2017  3:55 PM       Telemetry:AFIB    Objective:   Vital Signs:    Visit Vitals    /54    Pulse 67    Temp 98.1 °F (36.7 °C)    Resp 16    Ht 6' 2\" (1.88 m)    Wt 71.9 kg (158 lb 8.2 oz)    SpO2 100%    BMI 20.35 kg/m2       O2 Device: Endotracheal tube, Ventilator       Temp (24hrs), Av.6 °F (35.3 °C), Min:36.7 °F (2.6 °C), Max:98.6 °F (37 °C)       Intake/Output:   Last shift:      03/10 0701 - 03/10 1900  In: 764.4 [I.V.:419.4]  Out: 210 [Urine:210]    Last 3 shifts: 1901 - 03/10 07  In: 5838.3 [I.V.:3873.3]  Out:  [Urine:]      Intake/Output Summary (Last 24 hours) at 03/10/17 1049  Last data filed at 03/10/17 1000   Gross per 24 hour   Intake          4202.73 ml   Output             1305 ml   Net          2897.73 ml       Last 3 Recorded Weights in this Encounter    17 0635   Weight: 65.5 kg (144 lb 6.4 oz) 65.5 kg (144 lb 6.4 oz) 71.9 kg (158 lb 8.2 oz)       Ventilator Settings:  Mode Rate Tidal Volume Pressure FiO2 PEEP   Spontaneous, Pressure support   450 ml  7 cm H2O 30 % 5 cm H20     Peak airway pressure: 13 cm H2O    Plateau pressure:     Tidal volume:    Minute ventilation: 8.79 l/min   SPO2      ARDS network Guidelines: Lung protective strategy and Plateau pressure goals less than or equal to 30      Physical Exam:     General/Neurology: Intubated, moving all extremities, PERRL, awake and alert, attempting to [facility], appears older than stated age  Head:   Temporal wasting  Eye:   No scleral icterus, discharge improving  Nose:   No erythema  Throat:  ETT in place, OGT  Lung:   Adequate air entry bilateral equal, no wheeze/rhonchi  Heart:   Irreg, no m  Abdomen:  Soft, ND, +BS, no masses, no organomegaly  :  Smith in place, clear yellow urine  Extremities:  No pedal edema, no cyanosis   Musculoskeletal: No joint swelling or tenderness.   Skin:   Warm and dry      Data:       Recent Results (from the past 24 hour(s))   GLUCOSE, POC    Collection Time: 03/09/17 11:19 AM   Result Value Ref Range    Glucose (POC) 153 (H) 70 - 110 mg/dL   VANCOMYCIN, TROUGH    Collection Time: 03/09/17  1:06 PM   Result Value Ref Range    Vancomycin,trough 12.7 10.0 - 20.0 ug/mL    Reported dose date: 20170308      Reported dose time: 1400      Reported dose: 750MG UNITS   POTASSIUM    Collection Time: 03/09/17  4:30 PM   Result Value Ref Range    Potassium 3.1 (L) 3.5 - 5.5 mmol/L   GLUCOSE, POC    Collection Time: 03/09/17  5:26 PM   Result Value Ref Range    Glucose (POC) 175 (H) 70 - 110 mg/dL   PHOSPHORUS    Collection Time: 03/09/17  9:23 PM   Result Value Ref Range    Phosphorus 2.0 (L) 2.5 - 4.9 MG/DL   GLUCOSE, POC    Collection Time: 03/10/17 12:00 AM   Result Value Ref Range    Glucose (POC) 163 (H) 70 - 110 mg/dL   CBC W/O DIFF    Collection Time: 03/10/17  4:00 AM   Result Value Ref Range    WBC 7.8 4.6 - 13.2 K/uL    RBC 3.48 (L) 4.70 - 5.50 M/uL    HGB 11.2 (L) 13.0 - 16.0 g/dL    HCT 33.9 (L) 36.0 - 48.0 %    MCV 97.4 (H) 74.0 - 97.0 FL    MCH 32.2 24.0 - 34.0 PG    MCHC 33.0 31.0 - 37.0 g/dL    RDW 13.7 11.6 - 14.5 %    PLATELET 479 994 - 803 K/uL    MPV 11.2 9.2 - 27.2 FL   METABOLIC PANEL, BASIC    Collection Time: 03/10/17  4:00 AM   Result Value Ref Range    Sodium 144 136 - 145 mmol/L    Potassium 3.6 3.5 - 5.5 mmol/L    Chloride 112 (H) 100 - 108 mmol/L    CO2 25 21 - 32 mmol/L    Anion gap 7 3.0 - 18 mmol/L    Glucose 175 (H) 74 - 99 mg/dL    BUN 22 (H) 7.0 - 18 MG/DL    Creatinine 1.16 0.6 - 1.3 MG/DL    BUN/Creatinine ratio 19 12 - 20      GFR est AA >60 >60 ml/min/1.73m2    GFR est non-AA >60 >60 ml/min/1.73m2    Calcium 7.5 (L) 8.5 - 10.1 MG/DL   PHOSPHORUS    Collection Time: 03/10/17  4:00 AM   Result Value Ref Range    Phosphorus 1.6 (L) 2.5 - 4.9 MG/DL   MAGNESIUM    Collection Time: 03/10/17  4:00 AM   Result Value Ref Range    Magnesium 1.8 1.8 - 2.4 mg/dL   GLUCOSE, POC    Collection Time: 03/10/17  5:44 AM   Result Value Ref Range    Glucose (POC) 167 (H) 70 - 110 mg/dL         Chemistry Recent Labs      03/10/17   0400  03/09/17   2123  03/09/17   1630  03/09/17   0352   03/08/17   0300   GLU  175*   --    --   113*   --   136*   NA  144   --    --   150*   --   155*   K  3.6   --   3.1*  3.7   < >  2.9*   CL  112*   --    --   121*   --   125*   CO2  25   --    --   22   --   19*   BUN  22*   --    --   35*   --   53*   CREA  1.16   --    --   1.45*   --   2.16*   CA  7.5*   --    --   7.5*   --   7.7*   MG  1.8   --    --   2.1   --   2.3   PHOS  1.6*  2.0*   --   1.4*   --   1.7*   AGAP  7   --    --   7   --   11   BUCR  19   --    --   23*   --   25*   AP   --    --    --   79   --   69   TP   --    --    --   5.0*   --   4.9*   ALB   --    --    --   2.0*   --   2.1*   GLOB   --    --    --   3.0   --   2.8   AGRAT   --    --    --   0.7*   --   0.8    < > = values in this interval not displayed. Lactic Acid No results found for: LAC  No results for input(s): LAC in the last 72 hours.      Liver Enzymes Protein, total   Date Value Ref Range Status   03/09/2017 5.0 (L) 6.4 - 8.2 g/dL Final     Albumin   Date Value Ref Range Status   03/09/2017 2.0 (L) 3.4 - 5.0 g/dL Final     Globulin   Date Value Ref Range Status   03/09/2017 3.0 2.0 - 4.0 g/dL Final     A-G Ratio   Date Value Ref Range Status   03/09/2017 0.7 (L) 0.8 - 1.7   Final     AST (SGOT)   Date Value Ref Range Status   03/09/2017 48 (H) 15 - 37 U/L Final     Alk.  phosphatase   Date Value Ref Range Status   03/09/2017 79 45 - 117 U/L Final     Recent Labs      03/09/17   0352  03/08/17   0300   TP  5.0*  4.9*   ALB  2.0*  2.1*   GLOB  3.0  2.8   AGRAT  0.7*  0.8   SGOT  48*  36   AP  79  69        CBC w/Diff Recent Labs      03/10/17   0400  03/09/17   0352  03/08/17   0300   WBC  7.8  7.7  8.7   RBC  3.48*  3.39*  3.36*   HGB  11.2*  11.1*  10.8*   HCT  33.9*  33.8*  33.8*   PLT  161  151  149   GRANS   --   72  72   LYMPH   --   16*  19*   EOS   --   5  2        Cardiac Enzymes No results found for: CPK, CKMMB, CKMB, RCK3, CKMBT, CKNDX, CKND1, FABRICIO, TROPT, TROIQ, LISBETH, TROPT, TNIPOC, BNP, BNPP     BNP No results found for: BNP, BNPP, XBNPT     Coagulation Recent Labs      03/08/17   0300   PTP  17.0*   INR  1.4*         Thyroid  Lab Results   Component Value Date/Time    T4, Total 7.8 04/22/2014 08:25 AM    TSH 3.08 03/06/2017 03:20 PM       Lab Results   Component Value Date/Time    T4, Total 7.8 04/22/2014 08:25 AM        Lipid Panel Lab Results   Component Value Date/Time    Cholesterol, total 135 06/08/2012 05:25 AM    HDL Cholesterol 34 06/08/2012 05:25 AM    LDL, calculated 80.8 06/08/2012 05:25 AM    VLDL, calculated 20.2 06/08/2012 05:25 AM    Triglyceride 101 06/08/2012 05:25 AM    CHOL/HDL Ratio 4.0 06/08/2012 05:25 AM        ABG Recent Labs      03/09/17   0849  03/08/17   0846   PHI  7.389  7.363   PCO2I  32.4*  33.0*   PO2I  144*  151*   HCO3I  19.6*  18.7*   FIO2I  0.30  30        Urinalysis Lab Results   Component Value Date/Time    Color DARK YELLOW 03/06/2017 03:38 PM Appearance TURBID 03/06/2017 03:38 PM    Specific gravity 1.020 03/06/2017 03:38 PM    pH (UA) >8.5 03/06/2017 03:38 PM    Protein 300 03/06/2017 03:38 PM    Glucose NEGATIVE  03/06/2017 03:38 PM    Ketone TRACE 03/06/2017 03:38 PM    Bilirubin NEGATIVE  03/06/2017 03:38 PM    Urobilinogen 1.0 03/06/2017 03:38 PM    Nitrites NEGATIVE  03/06/2017 03:38 PM    Leukocyte Esterase LARGE 03/06/2017 03:38 PM    Epithelial cells FEW 03/06/2017 03:38 PM    Bacteria 4+ 03/06/2017 03:38 PM    WBC TOO NUMEROUS TO COUNT 03/06/2017 03:38 PM    RBC TOO NUMEROUS TO COUNT 03/06/2017 03:38 PM        Micro  Recent Labs      03/09/17   1015   CULT  PENDING     Recent Labs      03/09/17   1015   CULT  PENDING        EKG  No results found for this or any previous visit. PFT  No flowsheet data found. Other  ASA reactivity:   Pre-albumin:   Ionized Calcium:   NH4:   T3, FT4:  Cortisol:  Urine Osm:  Urine Lytes:   HbA1c:      Ultrasound       LE Doppler       ECHO  3/7/17  Left ventricle: Systolic function was mildly reduced by visual assessment. Ejection fraction was estimated to be 50 %. There was possible hypokinesis  of anterior wall. Doppler parameters were consistent with abnormal left  ventricular relaxation (grade 1 diastolic dysfunction). Right ventricle: Systolic function was normal.    Aortic valve: There was no stenosis. Tricuspid valve: There was mild regurgitation. Inferior vena cava, hepatic veins: The inferior vena cava was dilated. Unable to evaluate inspiratory variation due to intubation.      CT (Most Recent)    Results from Hospital Encounter encounter on 03/06/17   CT HEAD WO CONT   Narrative EXAM: CT head    INDICATION: Altered mental status    COMPARISON: Several prior exams, most recently 12/24/2016    TECHNIQUE: Axial CT imaging of the head was performed without intravenous  contrast.    One or more dose reduction techniques were used on this CT: automated exposure  control, adjustment of the mAs and/or kVp according to patient's size, and  iterative reconstruction techniques. The specific techniques utilized on this CT  exam have been documented in the patient's electronic medical record.     _______________    FINDINGS: Examination detail is mildly limited by motion artifact. BRAIN AND POSTERIOR FOSSA: There is mild cortical and cerebellar volume loss  present, the appearance of which is similar to prior examinations. There is a  stable appearance to the ventricular size and configuration. The basilar  cisterns are patent. Mild subcortical and periventricular white matter  hypoattenuation is unchanged. Physiologic bilateral basal ganglia calcifications  are present. There is no intracranial hemorrhage, mass effect, or midline shift. There are no areas of abnormal parenchymal attenuation. EXTRA-AXIAL SPACES AND MENINGES: There are no abnormal extra-axial fluid  collections. CALVARIUM: Intact. SINUSES: There is mild mucosal thickening of the anterior and posterior ethmoid  air cells. OTHER: Atherosclerotic calcification of bilateral carotid siphons noted. _______________         Impression IMPRESSION:      1. Mildly motion limited examination, without evidence of acute intracranial  abnormality. Of note, noncontrast head CT can be normal in the context of early  acute stroke. 2. Unchanged subcortical and periventricular white matter hypoattenuation, a  nonspecific finding likely pertaining to chronic ischemic microvascular change. 3. Minimal, nonspecific mucosal thickening of the anterior and posterior ethmoid  air cells. XR (Most Recent). CXR  reviewed by me and compared with previous CXR   Results from [place] encounter on 03/06/17   XR ABD PORT  1 V   Narrative EXAM: Frontal View of the Abdomen And Pelvis    Indication: NG tube placement    Technique: 2 separate frontal views of the abdomen and pelvis.     Comparison: KUB performed 03/08/2017, barium swallow esophagram from 12/03/2012    _______________  Findings:    Gastric tube is coiled in the proximal left upper quadrant with both the tip and  sidehole projecting below the diaphragm and GE junction, probably proximal  stomach. Scattered colonic bowel gas without dilatation. Visualized lung bases are clear.    _______________       Impression IMPRESSION:    1. Nasogastric tube, and the proximal stomach. Nonspecific bowel gas pattern. CXR 3/10/17  1. Endotracheal tube in good radiographic position. 2. Slightly high position of the nasogastric tube. Recommend advancement. 3. No acute cardiopulmonary disease. Best practice : All below core measures reviewed and addressed:   [x] Antibiotic choice. [x] Severe Sepsis bundles follwed; SIRS screen met? Yes  [x] Fluids. [x] Lactic acid ordered- initial and repeat Q6hrs if elevated till normalized. [x] Cultures drawn. [x] Antibiotic administered within 1hr-ICU and 3hrs ED. [x] Large bore IV- 2 sites       PICC   [x] Pressors aim MAP >65mmHg. [x] Steroids. [x] Glycemic control. [x] Infection control. [x] Mech. Ventilated patients- aim to keep peak plateau pressure 87-40QB H2O. [x] HOB at >= 30 degree. [x] Stress ulcer prophylaxis. [x] DVT prophylaxis. [x] Central Line Bundle Followed. [x] Smith Bundle Followed. [x] Ventilator Bundle Followed. (Daily sedation holiday to assess readiness for weaning from ventilator & SBT trial starting at 6.00 am, HOB 30-degree elevation, Chlorhexidine mouth washes & Routine oral care every 4 hours, Dike tube to suction at 20-30 cm H2O, Maintain Dike tube with 5-10ml air every 4 hours, DVT prophylaxis, GI prophylaxis etc.).     The patient is: [] acutely ill Risk of deterioration: [] moderate    [x] critically ill  [x] high     [x]See my orders for details    My assessment, plan of care, findings, medications, side effects etc were discussed with:  [x] Nurse [] PT/OT    [x] Respiratory therapy [x]  Dejon Clement   [] Pharmacist [x] MDR   [] Family: answered all questions to satisfaction [] Patient: answered all questions to satisfaction   [x]  [x]      [x] Case & management strategies discussed today on multidisciplinary rounds    High complexity decision making was performed during the evaluation of this patient at high risk for decompensation with multiple organ involvement      Marcin Boateng  3/10/2017

## 2017-03-10 NOTE — PROGRESS NOTES
0710: Bedside and Verbal shift change report given to Germania Moran RN (oncoming nurse) by [facility], RN (offgoing nurse). Report included the following information SBAR, Kardex, ED Summary, Procedure Summary, Intake/Output, MAR, Accordion, Recent Results, Med Rec Status, Cardiac Rhythm A fib and Alarm Parameters . [id number]: Bedside and Verbal shift change report given to Yaneth Senior RN (oncoming nurse) by Germania Moran RN (offgoing nurse). Report included the following information SBAR, Kardex, ED Summary, Procedure Summary, Intake/Output, MAR, Accordion, Recent Results, Med Rec Status, Cardiac Rhythm A. fib and Alarm Parameters .

## 2017-03-10 NOTE — PROGRESS NOTES
Problem: Ventilator Management  Goal: *Adequate oxygenation and ventilation  Outcome: Progressing Towards Goal  Assumed respiratory care of pt on mechanical ventilator. Patient has a hx of  HTN, DM, TIA, and stroke. Pt was found unresponsive by staff at the facility that he lives in. Approximately 1 min of CPR was performed, and heart rate returned. He was intubated by EMS and transported to the ED. While in the ED, patient was found to be severly hypotensive. He was given fluids, in which he responded. Patient will be admitted to the ICU. Pt is currently in a Spontaneous breathing trial for evaluation of extubation. If successful, pt may be extubated later today. Pt is a DNR, and will not be re-intubated once ETT is removed. Will continue to follow pt closely and coordinate care with ICU physicians and staff.   CAIO

## 2017-03-10 NOTE — PROGRESS NOTES
2040,received pt on vent with head elevated sxn for small amount of white secretions,ett secured and katie tube cleared,moved to center of mouth will continue to monitor though the shift.

## 2017-03-10 NOTE — PROGRESS NOTES
Response to query . The pt was admitted with hypotension and required pressors . clinical diagnosis was sepsis . However the blood cultures are no growth . His troponins were elevated  And he had ekg changes . His sepsis like picture is multifactorial .possible infection and possible mi have contributed to his respiratory failure . Discussed with pulmonary .  Pt has responded to fluid and antibiotics despite negative cultures he does have a uti

## 2017-03-10 NOTE — PROGRESS NOTES
1800 patient taken off psv and placed back in controlled mode of ventilation acvc+ possible extubation tomorrow.

## 2017-03-10 NOTE — PROGRESS NOTES
Patient remains unable to communicate at this time as he is still on life support measures. No family ws seen at time of this visit.  offered prayer. Merlinda Kelp will continue to follow and will provide pastoral care on an as needed/requested basis.     Gunjan Loving   Spiritual Care   [phone]

## 2017-03-10 NOTE — PROGRESS NOTES
Patient completed ROM as follows. RLE X 15 (dorsi/plantarflexion; hip:int/external rotation, flex/extension, ab/adduction; hamstring stretch with 10 sec holds; SAQ with 10 sec holds)   LLE X 15 (dorsi/plantarflexion; hip:int/external rotation, flex/extension, ab/adduction; hamstring stretch with 10 sec holds; SAQ with 10 sec holds )      Pt participation was:  () AROM/ AAROM  (BLE) PROM    Pain Level Before FMP: Unable to verbalize   Pain Level After FMP: Unable to verbalize  Non Verbal Pain Scale : 0    (x) Alerted Nursing. (x) Call bell within patient reach. (x) Pt resting in no apparent distress in bed. NOTE:   Pt presented supine in bed, ventilated, NAD.    Pt was not able to follow commands     Marlys Suazo, [facility]

## 2017-03-11 NOTE — PROGRESS NOTES
[facility]  Pulmonary, Critical Care, and Sleep Medicine    Name: Cris Rios MRN: [id number]   : 1935 Hospital: [address] [place] [address]   Date: 3/11/2017        [facility]    [x] I have reviewed the flowsheet and previous days notes. Events, vitals, medications and notes from last 24 hours reviewed. Care plan discussed with staff, patient, family and on multidisciplinary rounds. 3/11/2017  No overnight events  Planning for extubation today, tolerating SBT, family at bedside  Afebrile, HD stable  Na and Cr much improved, tolerating TF  UO good, phos being replaced    IMPRESSION:   · Shock septic, resolved  · Urosepsis, Ucx +proteus  · Acute respiratory failure requiring MV, possible aspiration, tolerating SBT, to extubate today  · Possible cardiac arrest, CPR performed by nursing facility  · Elevated troponin, EF 50%, cannot r/o hypokinesis of anterior wall, grade 1 DD  · Hypernatremia, hyperchloremia due to volume depletion, improving  · AfibRVR, rate controlled  · Metabolic acidosis, resolved  · Lactic acidosis, resolved  · REID on CKD, likely volume depletion, hypotension, improved  · AMS/acute encephalopathy, multifactorial due to above, metabolic/infectious, unknown baseline  · Hypokalemia, hypophosphatemia, being replaced  · Anemia, no active bleeding, component dilutional  · DM  · Hx HTN  · Hx TIA/CVA  · Hx RLS    · Code status: DNR/DNI      RECOMMENDATIONS:   · CVS: Monitor HD. ECHO as below. PICC placed. Currently off pressor support. Enzymes trended down. ASA, statin. BB when BP can tolerate  · Respiratory: Extubate to nc today. No reintubation as per family wishes. BiPAP prn. Aspiration precautions. Duonebs, abx. HOB 30 degrees  · ID:  Sepsis bundle. Urine cx +proteus. Cont zosyn. blood cx: NGTD. LA normalized. cortisporin eye gtts  · Hematology/Oncology:  INR elevated slightly.  Hgb and plts stable  · Renal:  Follow BUN, Cr. Trend Na, Cl. IVF Monitor UOlori for now. Renal signed off  · GI/:  PPI. Trend LFT's. · Endocrine:  Normal TSH. Follow FSG, SSI prn  · Neurology/Pain/Sedation:  Not requiring sedation. CT head negative for acute process. Continue neuro checks. · Musculoskeletal:  Normal CK  · Skin/Wound: Local preventative wound care  · FEN: Hold TF, will likely need DFT, ?PEG. Speech eval if MS improves. Replace lytes prn per protocol. IVF  · Prophylaxis: GI Prophylaxis with protonix. DVT Prophylaxis with heparin. · Restraints: B wrist  · Palliative care following, family at bedside, updated  ·                                                               · Glycemic Control. Glucose stabilizer per ICU protocol when on insulin drip. Maintain blood glucose 140-180. · Replace electrolytes per ICU electrolyte replacement protocol  · Ventilator bundle & Sedation protocol followed. Daily sedation holiday, assessment for readiness for SBT and then re-titrate if required. Chlorhexidine mouth washes. · HOB 30 degree elevation all the time. Aggressive pulmonary toileting. Incentive spirometry when appropriate. Aspiration precautions. · Sepsis bundle and protocol followed. Follow serial lactic acid, frequent BMP check, fluid resuscitation. Follow cultures. Deescalate antibiotic when appropriate. · Sandoval bundle followed, remove sandoval catheter when not critically ill. · Central Line bundle followed, remove when not needed. · Skin & Wound care. · Weekly prealbumin, nutritional consult. · PT/OT eval and treat. OOB when appropriate. · Further recommendations will be based on the patient's response to recommended treatment and results of the investigation ordered. · Quality Care: PPI, DVT prophylaxis, HOB elevated, Infection control all reviewed and addressed. · Events and notes from last 24 hours reviewed. Care plan discussed with nursing     Review of Systems:  Review of systems not obtained due to patient factors.       Allergies   Allergen Reactions  Sulfa (Sulfonamide Antibiotics) Unknown (comments)    Sulfa (Sulfonamide Antibiotics) Unknown (comments)      Past Medical History:   Diagnosis Date    Chronic back pain     s/p Leminectomy and discectomy (2011)    Depression     Diabetes mellitus     Herniated disc     status post laminectomy and diskectomy surgery in January    Hyperlipidemia     Hypertension     Hypertensive heart disease     Kidney stones     Nephrocalcinosis     Other ill-defined conditions(799.89)     AFIB    PVC (premature ventricular contraction)     Restless leg syndrome     Stroke ([facility] [address] 6/7/12    TIA    TIA (transient ischemic attack)       Past Surgical History:   Procedure Laterality Date    HX LUMBAR DISKECTOMY  january 2011    LAMINECTOMY,LUMBAR      LITHOTRIPSY        Social History   Substance Use Topics    Smoking status: Never Smoker    Smokeless tobacco: Not on file    Alcohol use No      Family History   Problem Relation Age of Onset    Heart Failure Mother     Stroke Mother     Heart Failure Father       Prior to Admission medications    Medication Sig Start Date End Date Taking? Authorizing Provider   rOPINIRole (REQUIP) 0.5 mg tablet Take 0.5 mg by mouth three (3) times daily. Haider Salamanca MD   multivitamin (ONE A DAY) tablet Take 1 Tab by mouth daily. Haider Salamanca MD   potassium 99 mg tablet Take 99 mg by mouth daily. MD JOYCE Rollins MD OTHER Phys Other, MD   cyanocobalamin (VITAMIN B12) 1,000 mcg/mL injection 1 mL by IntraMUSCular route daily. 5/17/14   Shannan Toth MD   cyanocobalamin (VITAMIN B-12) 1,000 mcg tablet Take 1 Tab by mouth daily. 5/16/14   Shannan Toth MD   Aspirin, Buffered 81 mg tab Take 81 mg by mouth. Haider Salamanca MD   metoprolol (LOPRESSOR) 25 mg tablet Take 0.5 Tabs by mouth every twelve (12) hours. 4/24/14   Feliciano Contreras MD   diazepam (VALIUM) 5 mg tablet Take 2.5 mg by mouth two (2) times a day.     Historical Provider   tamsulosin (FLOMAX) 0.4 mg capsule Take 0.4 mg by mouth daily. Historical Provider   darifenacin (ENABLEX) 7.5 mg ER tablet Take 7.5 mg by mouth daily. Historical Provider   FLUoxetine (PROZAC) 40 mg capsule Take 40 mg by mouth daily. Historical Provider   pramipexole (MIRAPEX) 1.5 mg tablet Take 1.5 mg by mouth three (3) times daily. Historical Provider   METFORMIN HCL (METFORMIN PO) Take 500 mg by mouth Before breakfast, lunch, and dinner. Historical Provider   OMEPRAZOLE (PRILOSEC PO) Take  by mouth. Historical Provider   atorvastatin (LIPITOR) 80 mg tablet Take 40 mg by mouth daily. Historical Provider   sitaGLIPtin (JANUVIA) 50 mg tablet Take 50 mg by mouth daily. Historical Provider   lisinopril (PRINIVIL, ZESTRIL) 2.5 mg tablet Take  by mouth daily. Historical Provider     Current Facility-Administered Medications   Medication Dose Route Frequency    sodium phosphate 12 mmol in 0.9% sodium chloride 250 mL infusion  12 mmol IntraVENous ONCE    potassium chloride (KLOR-CON) packet 20 mEq  20 mEq Oral DAILY    0.45% sodium chloride with KCl 20 mEq/L infusion   IntraVENous CONTINUOUS    lijz-iuffblu-qtg-hydrocort (CORTISPORIN) 3.5-400-10,000 mg-unit/g-1% ointment   Both Eyes BID    heparin (porcine) injection 5,000 Units  5,000 Units SubCUTAneous Q12H    sodium chloride (NS) flush 10-40 mL  10-40 mL InterCATHeter Q8H    piperacillin-tazobactam (ZOSYN) 3.375 g in  mL MBP##EXTENDED INFUSION  3.375 g IntraVENous Q8H    pantoprazole (PROTONIX) 40 mg in sodium chloride 0.9 % 10 mL injection  40 mg IntraVENous DAILY    chlorhexidine (PERIDEX) 0.12 % mouthwash 10 mL  10 mL Oral Q12H    insulin lispro (HUMALOG) injection   SubCUTAneous Q6H       Lines: All central lines examined by me. No signs of erythema, induration, discharge.       Peripheral Intravenous Line:  Peripheral IV 03/06/17 Left Wrist (Active)   Site Assessment Clean, dry, & intact 3/6/2017  3:30 PM   Phlebitis Assessment 0 3/6/2017  3:30 PM   Infiltration Assessment 0 3/6/2017  3:30 PM   Dressing Status Clean, dry, & intact 3/6/2017  3:30 PM       Peripheral IV 17 Right Antecubital (Active)   Site Assessment Clean, dry, & intact 3/6/2017  3:34 PM   Phlebitis Assessment 0 3/6/2017  3:34 PM   Infiltration Assessment 0 3/6/2017  3:34 PM   Dressing Status Clean, dry, & intact 3/6/2017  3:34 PM       Peripheral IV 17 Left Antecubital (Active)   Site Assessment Clean, dry, & intact 3/6/2017  3:37 PM   Phlebitis Assessment 0 3/6/2017  3:37 PM   Infiltration Assessment 0 3/6/2017  3:37 PM   Dressing Status Clean, dry, & intact 3/6/2017  3:37 PM      Airway:  Airway - Endotracheal Tube 17 Oral (Active)   Insertion Depth (cm) 22 cm 3/6/2017  3:55 PM   Line Lucas Lips 3/6/2017  3:55 PM   Side Secured Centered 3/6/2017  3:55 PM   Site Assessment Clean, dry, & intact 3/6/2017  3:55 PM       Telemetry:AFIB    Objective:   Vital Signs:    Visit Vitals    /46    Pulse 74    Temp (!) 37.6 °F (3.1 °C)    Resp 17    Ht 6' 2\" (1.88 m)    Wt 71.9 kg (158 lb 8.2 oz)    SpO2 100%    BMI 20.35 kg/m2       O2 Device: Endotracheal tube, Ventilator       Temp (24hrs), Av.4 °F (34.1 °C), Min:37.6 °F (3.1 °C), Max:99.7 °F (37.6 °C)       Intake/Output:   Last shift:           Last 3 shifts:  1901 -  07  In: 5903.5 [I.V.:2943.5]  Out: 5914 [Urine:1815]      Intake/Output Summary (Last 24 hours) at 17 1059  Last data filed at 17 0700   Gross per 24 hour   Intake          3024.07 ml   Output             1105 ml   Net          1919.07 ml       Last 3 Recorded Weights in this Encounter    17 1959 17 0635 03/10/17 2000   Weight: 65.5 kg (144 lb 6.4 oz) 71.9 kg (158 lb 8.2 oz) 71.9 kg (158 lb 8.2 oz)       Ventilator Settings:  Mode Rate Tidal Volume Pressure FiO2 PEEP   Assist control, VC+   450 ml  5 cm H2O 30 % 5 cm H20     Peak airway pressure: 13 cm H2O    Plateau pressure:     Tidal volume: Minute ventilation: 8.39 l/min   SPO2      ARDS network Guidelines: Lung protective strategy and Plateau pressure goals less than or equal to 30      Physical Exam:     General/Neurology: Intubated, moving all extremities, PERRL, opens eyes to voice appears older than stated age  Head:   Temporal wasting  Eye:   No scleral icterus, discharge improving  Nose:   No erythema  Throat:  ETT in place, OGT  Lung:   Adequate air entry bilateral equal, no wheeze/rhonchi  Heart:   Irreg, no m  Abdomen:  Soft, ND, +BS, no masses, no organomegaly  :  Smith in place, clear yellow urine  Extremities:  No pedal edema, no cyanosis   Musculoskeletal: No joint swelling or tenderness.   Skin:   Warm and dry      Data:       Recent Results (from the past 24 hour(s))   GLUCOSE, POC    Collection Time: 03/10/17 12:09 PM   Result Value Ref Range    Glucose (POC) 237 (H) 70 - 110 mg/dL   POTASSIUM    Collection Time: 03/10/17  3:00 PM   Result Value Ref Range    Potassium 5.0 3.5 - 5.5 mmol/L   MAGNESIUM    Collection Time: 03/10/17  3:00 PM   Result Value Ref Range    Magnesium 2.2 1.8 - 2.4 mg/dL   GLUCOSE, POC    Collection Time: 03/10/17  6:34 PM   Result Value Ref Range    Glucose (POC) 203 (H) 70 - 110 mg/dL   GLUCOSE, POC    Collection Time: 03/10/17 11:56 PM   Result Value Ref Range    Glucose (POC) 159 (H) 70 - 110 mg/dL   PHOSPHORUS    Collection Time: 03/11/17  3:10 AM   Result Value Ref Range    Phosphorus 1.5 (L) 2.5 - 4.9 MG/DL   MAGNESIUM    Collection Time: 03/11/17  3:10 AM   Result Value Ref Range    Magnesium 2.1 1.8 - 2.4 mg/dL   CBC WITH AUTOMATED DIFF    Collection Time: 03/11/17  3:10 AM   Result Value Ref Range    WBC 8.1 4.6 - 13.2 K/uL    RBC 3.31 (L) 4.70 - 5.50 M/uL    HGB 10.7 (L) 13.0 - 16.0 g/dL    HCT 32.2 (L) 36.0 - 48.0 %    MCV 97.3 (H) 74.0 - 97.0 FL    MCH 32.3 24.0 - 34.0 PG    MCHC 33.2 31.0 - 37.0 g/dL    RDW 13.8 11.6 - 14.5 %    PLATELET 174 337 - 753 K/uL    MPV 11.3 9.2 - 11.8 FL NEUTROPHILS 74 (H) 40 - 73 %    LYMPHOCYTES 15 (L) 21 - 52 %    MONOCYTES 6 3 - 10 %    EOSINOPHILS 5 0 - 5 %    BASOPHILS 0 0 - 2 %    ABS. NEUTROPHILS 6.0 1.8 - 8.0 K/UL    ABS. LYMPHOCYTES 1.3 0.9 - 3.6 K/UL    ABS. MONOCYTES 0.5 0.05 - 1.2 K/UL    ABS. EOSINOPHILS 0.4 0.0 - 0.4 K/UL    ABS. BASOPHILS 0.0 0.0 - 0.06 K/UL    DF AUTOMATED     METABOLIC PANEL, COMPREHENSIVE    Collection Time: 03/11/17  3:10 AM   Result Value Ref Range    Sodium 144 136 - 145 mmol/L    Potassium 4.1 3.5 - 5.5 mmol/L    Chloride 111 (H) 100 - 108 mmol/L    CO2 26 21 - 32 mmol/L    Anion gap 7 3.0 - 18 mmol/L    Glucose 146 (H) 74 - 99 mg/dL    BUN 19 (H) 7.0 - 18 MG/DL    Creatinine 1.15 0.6 - 1.3 MG/DL    BUN/Creatinine ratio 17 12 - 20      GFR est AA >60 >60 ml/min/1.73m2    GFR est non-AA >60 >60 ml/min/1.73m2    Calcium 7.2 (L) 8.5 - 10.1 MG/DL    Bilirubin, total 0.6 0.2 - 1.0 MG/DL    ALT (SGPT) 35 16 - 61 U/L    AST (SGOT) 39 (H) 15 - 37 U/L    Alk.  phosphatase 95 45 - 117 U/L    Protein, total 4.7 (L) 6.4 - 8.2 g/dL    Albumin 1.7 (L) 3.4 - 5.0 g/dL    Globulin 3.0 2.0 - 4.0 g/dL    A-G Ratio 0.6 (L) 0.8 - 1.7     GLUCOSE, POC    Collection Time: 03/11/17  5:44 AM   Result Value Ref Range    Glucose (POC) 174 (H) 70 - 110 mg/dL         Chemistry Recent Labs      03/11/17   0310  03/10/17   1500  03/10/17   0400  03/09/17   2123   03/09/17   0352   GLU  146*   --   175*   --    --   113*   NA  144   --   144   --    --   150*   K  4.1  5.0  3.6   --    < >  3.7   CL  111*   --   112*   --    --   121*   CO2  26   --   25   --    --   22   BUN  19*   --   22*   --    --   35*   CREA  1.15   --   1.16   --    --   1.45*   CA  7.2*   --   7.5*   --    --   7.5*   MG  2.1  2.2  1.8   --    --   2.1   PHOS  1.5*   --   1.6*  2.0*   --   1.4*   AGAP  7   --   7   --    --   7   BUCR  17   --   19   --    --   23*   AP  95   --    --    --    --   79   TP  4.7*   --    --    --    --   5.0*   ALB  1.7*   --    --    --    --   2.0* GLOB  3.0   --    --    --    --   3.0   AGRAT  0.6*   --    --    --    --   0.7*    < > = values in this interval not displayed. Lactic Acid No results found for: LAC  No results for input(s): LAC in the last 72 hours. Liver Enzymes Protein, total   Date Value Ref Range Status   03/11/2017 4.7 (L) 6.4 - 8.2 g/dL Final     Albumin   Date Value Ref Range Status   03/11/2017 1.7 (L) 3.4 - 5.0 g/dL Final     Globulin   Date Value Ref Range Status   03/11/2017 3.0 2.0 - 4.0 g/dL Final     A-G Ratio   Date Value Ref Range Status   03/11/2017 0.6 (L) 0.8 - 1.7   Final     AST (SGOT)   Date Value Ref Range Status   03/11/2017 39 (H) 15 - 37 U/L Final     Alk. phosphatase   Date Value Ref Range Status   03/11/2017 95 45 - 117 U/L Final     Recent Labs      03/11/17   0310  03/09/17   0352   TP  4.7*  5.0*   ALB  1.7*  2.0*   GLOB  3.0  3.0   AGRAT  0.6*  0.7*   SGOT  39*  48*   AP  95  79        CBC w/Diff Recent Labs      03/11/17   0310  03/10/17   0400  03/09/17   0352   WBC  8.1  7.8  7.7   RBC  3.31*  3.48*  3.39*   HGB  10.7*  11.2*  11.1*   HCT  32.2*  33.9*  33.8*   PLT  164  161  151   GRANS  74*   --   72   LYMPH  15*   --   16*   EOS  5   --   5        Cardiac Enzymes No results found for: CPK, CKMMB, CKMB, RCK3, CKMBT, CKNDX, CKND1, FABRICIO, TROPT, TROIQ, LISBETH, TROPT, TNIPOC, BNP, BNPP     BNP No results found for: BNP, BNPP, XBNPT     Coagulation No results for input(s): PTP, INR, APTT in the last 72 hours.     No lab exists for component: INREXT, INREXT      Thyroid  Lab Results   Component Value Date/Time    T4, Total 7.8 04/22/2014 08:25 AM    TSH 3.08 03/06/2017 03:20 PM       Lab Results   Component Value Date/Time    T4, Total 7.8 04/22/2014 08:25 AM        Lipid Panel Lab Results   Component Value Date/Time    Cholesterol, total 135 06/08/2012 05:25 AM    HDL Cholesterol 34 06/08/2012 05:25 AM    LDL, calculated 80.8 06/08/2012 05:25 AM    VLDL, calculated 20.2 06/08/2012 05:25 AM Triglyceride 101 06/08/2012 05:25 AM    CHOL/HDL Ratio 4.0 06/08/2012 05:25 AM        ABG Recent Labs      03/09/17   0849   PHI  7.389   PCO2I  32.4*   PO2I  144*   HCO3I  19.6*   FIO2I  0.30        Urinalysis Lab Results   Component Value Date/Time    Color DARK YELLOW 03/06/2017 03:38 PM    Appearance TURBID 03/06/2017 03:38 PM    Specific gravity 1.020 03/06/2017 03:38 PM    pH (UA) >8.5 03/06/2017 03:38 PM    Protein 300 03/06/2017 03:38 PM    Glucose NEGATIVE  03/06/2017 03:38 PM    Ketone TRACE 03/06/2017 03:38 PM    Bilirubin NEGATIVE  03/06/2017 03:38 PM    Urobilinogen 1.0 03/06/2017 03:38 PM    Nitrites NEGATIVE  03/06/2017 03:38 PM    Leukocyte Esterase LARGE 03/06/2017 03:38 PM    Epithelial cells FEW 03/06/2017 03:38 PM    Bacteria 4+ 03/06/2017 03:38 PM    WBC TOO NUMEROUS TO COUNT 03/06/2017 03:38 PM    RBC TOO NUMEROUS TO COUNT 03/06/2017 03:38 PM        Micro  Recent Labs      03/09/17   1015   CULT  FEW  CANDIDA ALBICANS  *  RARE  STAPHYLOCOCCUS AUREUS  *     Recent Labs      03/09/17   1015   CULT  FEW  CANDIDA ALBICANS  *  RARE  STAPHYLOCOCCUS AUREUS  *        EKG  No results found for this or any previous visit. PFT  No flowsheet data found. Other  ASA reactivity:   Pre-albumin:   Ionized Calcium:   NH4:   T3, FT4:  Cortisol:  Urine Osm:  Urine Lytes:   HbA1c:      Ultrasound       LE Doppler       ECHO  3/7/17  Left ventricle: Systolic function was mildly reduced by visual assessment. Ejection fraction was estimated to be 50 %. There was possible hypokinesis  of anterior wall. Doppler parameters were consistent with abnormal left  ventricular relaxation (grade 1 diastolic dysfunction). Right ventricle: Systolic function was normal.    Aortic valve: There was no stenosis. Tricuspid valve: There was mild regurgitation. Inferior vena cava, hepatic veins: The inferior vena cava was dilated. Unable to evaluate inspiratory variation due to intubation.      CT (Most Recent) Results from [facility] encounter on 03/06/17   CT HEAD WO CONT   Narrative EXAM: CT head    INDICATION: Altered mental status    COMPARISON: Several prior exams, most recently 12/24/2016    TECHNIQUE: Axial CT imaging of the head was performed without intravenous  contrast.    One or more dose reduction techniques were used on this CT: automated exposure  control, adjustment of the mAs and/or kVp according to patient's size, and  iterative reconstruction techniques. The specific techniques utilized on this CT  exam have been documented in the patient's electronic medical record.     _______________    FINDINGS: Examination detail is mildly limited by motion artifact. BRAIN AND POSTERIOR FOSSA: There is mild cortical and cerebellar volume loss  present, the appearance of which is similar to prior examinations. There is a  stable appearance to the ventricular size and configuration. The basilar  cisterns are patent. Mild subcortical and periventricular white matter  hypoattenuation is unchanged. Physiologic bilateral basal ganglia calcifications  are present. There is no intracranial hemorrhage, mass effect, or midline shift. There are no areas of abnormal parenchymal attenuation. EXTRA-AXIAL SPACES AND MENINGES: There are no abnormal extra-axial fluid  collections. CALVARIUM: Intact. SINUSES: There is mild mucosal thickening of the anterior and posterior ethmoid  air cells. OTHER: Atherosclerotic calcification of bilateral carotid siphons noted. _______________         Impression IMPRESSION:      1. Mildly motion limited examination, without evidence of acute intracranial  abnormality. Of note, noncontrast head CT can be normal in the context of early  acute stroke. 2. Unchanged subcortical and periventricular white matter hypoattenuation, a  nonspecific finding likely pertaining to chronic ischemic microvascular change.   3. Minimal, nonspecific mucosal thickening of the anterior and posterior ethmoid  air cells. XR (Most Recent). CXR  reviewed by me and compared with previous CXR   Results from [place] encounter on 03/06/17   XR ABD PORT  1 V   Narrative EXAM: Frontal View of the Abdomen And Pelvis    Indication: NG tube placement    Technique: 2 separate frontal views of the abdomen and pelvis. Comparison: KUB performed 03/08/2017, barium swallow esophagram from 12/03/2012    _______________  Findings:    Gastric tube is coiled in the proximal left upper quadrant with both the tip and  sidehole projecting below the diaphragm and GE junction, probably proximal  stomach. Scattered colonic bowel gas without dilatation. Visualized lung bases are clear.    _______________       Impression IMPRESSION:    1. Nasogastric tube, and the proximal stomach. Nonspecific bowel gas pattern. CXR 3/11/17  1. No acute interval change from prior exam     2. Questionable small left pleural effusion     3. Medical devices as above      Best practice : All below core measures reviewed and addressed:   [x] Antibiotic choice. [x] Severe Sepsis bundles follwed; SIRS screen met? Yes  [x] Fluids. [x] Lactic acid ordered- initial and repeat Q6hrs if elevated till normalized. [x] Cultures drawn. [x] Antibiotic administered within 1hr-ICU and 3hrs ED. [x] Large bore IV- 2 sites       PICC   [x] Pressors aim MAP >65mmHg. [x] Steroids. [x] Glycemic control. [x] Infection control. [x] Mech. Ventilated patients- aim to keep peak plateau pressure 15-67HM H2O. [x] HOB at >= 30 degree. [x] Stress ulcer prophylaxis. [x] DVT prophylaxis. [x] Central Line Bundle Followed. [x] Smith Bundle Followed. [x] Ventilator Bundle Followed.  (Daily sedation holiday to assess readiness for weaning from ventilator & SBT trial starting at 6.00 am, HOB 30-degree elevation, Chlorhexidine mouth washes & Routine oral care every 4 hours, Bernadine tube to suction at 20-30 cm H2O, Maintain Bernadine tube with 5-10ml air every 4 hours, DVT prophylaxis, GI prophylaxis etc.).     The patient is: [] acutely ill Risk of deterioration: [] moderate    [x] critically ill  [x] high     [x]See my orders for details    My assessment, plan of care, findings, medications, side effects etc were discussed with:  [x] Nurse [] PT/OT    [x] Respiratory therapy [x] Dr. Brittney Gallego   [] Pharmacist [x] MDR   [x] Family: answered all questions to satisfaction [] Patient: answered all questions to satisfaction   [x]  [x]      [x] Case & management strategies discussed today on multidisciplinary rounds    High complexity decision making was performed during the evaluation of this patient at high risk for decompensation with multiple organ involvement      JOELLE Little  3/11/2017

## 2017-03-11 NOTE — PROGRESS NOTES
Progress Note      Patient: Herson Rivera               Sex: male          DOA: 3/6/2017       YOB: 1935      Age:  80 y.o.        LOS:  LOS: 5 days               Subjective:   Pt was extubated today . Pt remains minimally responsive and is encephalopathic probably due to his sepsis. His o2 sats are 100 % on nasal o2 . His metabolic acidosis has resolved . He did bump up his troponins and he may have had a cardiac arrest  As noted by pulmonary . He has htn and diabetes mellitus . Suspect that the pt's present illness is multifactorial     Objective:      Visit Vitals    /46    Pulse 80    Temp (!) 37.6 °F (3.1 °C)    Resp 17    Ht 6' 2\" (1.88 m)    Wt 71.9 kg (158 lb 8.2 oz)    SpO2 100%    BMI 20.35 kg/m2       Physical Exam:  Pt is extubated .    Heart intermittent atrial fibrillation   Lungs scattered rhonchi heard   Abdomen  Soft and nontender   Neuro does not respond to voice when seen      Lab/Data Reviewed:  CMP:   Lab Results   Component Value Date/Time     03/11/2017 03:10 AM    K 4.1 03/11/2017 03:10 AM     (H) 03/11/2017 03:10 AM    CO2 26 03/11/2017 03:10 AM    AGAP 7 03/11/2017 03:10 AM     (H) 03/11/2017 03:10 AM    BUN 19 (H) 03/11/2017 03:10 AM    CREA 1.15 03/11/2017 03:10 AM    GFRAA >60 03/11/2017 03:10 AM    GFRNA >60 03/11/2017 03:10 AM    CA 7.2 (L) 03/11/2017 03:10 AM    MG 2.1 03/11/2017 03:10 AM    PHOS 1.5 (L) 03/11/2017 03:10 AM    ALB 1.7 (L) 03/11/2017 03:10 AM    TP 4.7 (L) 03/11/2017 03:10 AM    GLOB 3.0 03/11/2017 03:10 AM    AGRAT 0.6 (L) 03/11/2017 03:10 AM    SGOT 39 (H) 03/11/2017 03:10 AM    ALT 35 03/11/2017 03:10 AM     CBC:   Lab Results   Component Value Date/Time    WBC 8.1 03/11/2017 03:10 AM    HGB 10.7 (L) 03/11/2017 03:10 AM    HCT 32.2 (L) 03/11/2017 03:10 AM     03/11/2017 03:10 AM           Assessment/Plan     Active Problems:    Respiratory failure ([facility] [place] [address] (3/6/2017)now extubated      Sepsis ([facility] [address] (3/6/2017)pt had a uti      Encephalopathy acute (3/7/2017) still present .        Debility (3/7/2017)        Plan:family wishes to make the pt comfort measures

## 2017-03-11 NOTE — ROUTINE PROCESS
0700: Assumed care of patient. Report received from [facility], [address] [place] [address]. Patient remains intubated. VSS. No signs of distress. Resting in bed quietly. Will continue to monitor. 0830: Tube feedings on hold at this time per verbal order from Marcin Briones. Patient to be extubated around 1000 today when family is present. Daughter at the bedside at this time. 1140: Patient extubated and placed on 4L NC. Mouth care completed. Patient's family at the bedside. 1300: Patients family at the bedside. Conversation had about the patients goals of care. Family spoke with MPOA and amongst each other decided to make patient CMO. Dr. Oleg Olivier notified. 1315: Dr. Oleg Olivier at bedside speaking with family. Orders placed for CMO. 1740: TRANSFER - OUT REPORT:    Verbal report given to [facility], RN(name) on Bethany Cortez  being transferred to 2100(unit) for routine progression of care       Report consisted of patients Situation, Background, Assessment and   Recommendations(SBAR). Information from the following report(s) SBAR, Kardex, ED Summary, Intake/Output, MAR, Accordion, Recent Results, Med Rec Status and Cardiac Rhythm NSR was reviewed with the receiving nurse. Lines:   PICC Triple Lumen 03/07/17 Right;Brachial (Active)   Central Line Being Utilized Yes 3/11/2017 12:00 PM   Criteria for Appropriate Use Long term IV/antibiotic administration 3/11/2017 12:00 PM   Site Assessment Clean, dry, & intact 3/11/2017  3:00 PM   Phlebitis Assessment 0 3/11/2017  3:00 PM   Infiltration Assessment 0 3/11/2017  3:00 PM   Arm Circumference (cm) 26 cm 3/7/2017  1:00 PM   Date of Last Dressing Change 03/07/17 3/11/2017 12:00 PM   Dressing Status Clean, dry, & intact 3/11/2017 12:00 PM   External Catheter Length (cm) 0 centimeters 3/11/2017  8:00 AM   Dressing Type Disk with Chlorhexadine gluconate (CHG); Tape;Transparent 3/11/2017 12:00 PM   Action Taken Open ports on tubing capped 3/11/2017 12:00 PM   Hub Color/Line Status Gray;Flushed;Capped 3/11/2017 12:00 PM   Positive Blood Return (Site #1) Yes 3/11/2017 12:00 PM   Hub Color/Line Status Red; Infusing;Flushed 3/11/2017 12:00 PM   Positive Blood Return (Site #2) Yes 3/11/2017 12:00 PM   Hub Color/Line Status White;Flushed;Capped 3/11/2017 12:00 PM   Positive Blood Return (Site #3) Yes 3/11/2017 12:00 PM   Alcohol Cap Used Yes 3/11/2017 12:00 PM        Opportunity for questions and clarification was provided.       Patient transported with:   O2 @ 4 liters  Registered Nurse

## 2017-03-11 NOTE — ROUTINE PROCESS
Bedside shift change report given to Robe Oglesby RN (oncoming nurse) by Roni Silvestre RN (offgoing nurse). Report included the following information SBAR, Kardex, ED Summary, Intake/Output, MAR, Accordion, Recent Results, Med Rec Status and Cardiac Rhythm Afib.

## 2017-03-11 NOTE — PROGRESS NOTES
Problem: Ventilator Management  Goal: *Adequate oxygenation and ventilation  Rec'd pt on Vent:  AC VC+, rate=10, Fio2=30%, Peep=5     -0815-Placed on SBT, PSV=7    1140-Order to extubate--positive cuff leak, extubate to 4 lpm NC    Resp Plan:  Monitor pt on vent. Wean and or titrate settings as needed for pt comfort & sats.       Resp Goal:  Extubation

## 2017-03-11 NOTE — PROGRESS NOTES
Assumed care of patient, Patient resting. Family at bedside. No nonverbal face grimace of pain. Patient mumbled words. Bed in lowest postion.  SBAR report received from [employer]

## 2017-03-11 NOTE — PROGRESS NOTES
2000 Assumed care of the pt, assessments completed and charted. Resting quietly in the bed, VSS, seems distressed with movement but otherwise not distressed. 0700 Pt requires cleanup of stool at every turn. Looks distressed at every turn. No other issues to report. Bedside and Verbal shift change report given to Farooq Head RN (oncoming nurse) by Izell Collet, RN (offgoing nurse). Report included the following information SBAR, ED Summary, Intake/Output, MAR, Recent Results and Cardiac Rhythm NSR.

## 2017-03-12 NOTE — ROUTINE PROCESS
Bedside / verbal shift change report given to [employer]  (oncoming nurse) by Demario Alexis RN (offgoing nurse). Report included the following information SBAR, Kardex, Intake/Output, MAR and Recent Results.

## 2017-03-12 NOTE — ROUTINE PROCESS
Bedside / verbal shift change report given to  [address] [place] [address] Avenue   (oncoming nurse) by Rio Cruz RN (offgoing nurse). Report included the following information SBAR, Kardex, Intake/Output, MAR and Recent Results.

## 2017-03-12 NOTE — PROGRESS NOTES
Progress Note      Patient: Loyda Gil               Sex: male          DOA: 3/6/2017       YOB: 1935      Age:  80 y.o.        LOS:  LOS: 6 days               Subjective:   Pt appears to be very comfortable. There are no complaints of pain . He is nonverbal however at least this am . Will ask palliative care to follow the pt in the am .family  was not in the room when he was seen today . On nasal o2 and sats are  100 %      Objective:      Visit Vitals    /63 (BP 1 Location: Left arm, BP Patient Position: At rest)    Pulse 78    Temp 99.4 °F (37.4 °C)    Resp 16    Ht 6' 2\" (1.88 m)    Wt 71.9 kg (158 lb 8.2 oz)    SpO2 100%    BMI 20.35 kg/m2       Physical Exam:  Pt was drowsy when seen . heart reg rate and rhythm   Lungs  Good air movement   Abdomen soft and nontender   Neuro pt is not in any distress    Lab/Data Reviewed:  CMP: No results found for: NA, K, CL, CO2, AGAP, GLU, BUN, CREA, GFRAA, GFRNA, CA, MG, PHOS, ALB, TBIL, TP, ALB, GLOB, AGRAT, SGOT, ALT, GPT  CBC: No results found for: WBC, HGB, HGBEXT, HCT, HCTEXT, PLT, PLTEXT, HGBEXT, HCTEXT, PLTEXT        Assessment/Plan     Active Problems:    Respiratory failure ([facility] [address] (3/6/2017)      Sepsis ([facility] [address] (3/6/2017)      Encephalopathy acute (3/7/2017)      Debility (3/7/2017)        Plan:pt is on comfort measures .  He will be followed by palliative care in the am

## 2017-03-12 NOTE — PROGRESS NOTES
Assumed care of patient, patient resting , bed in lowest [id number]. No s/s of distress. SBAR report received from [facility].      0296 Contacted Dr. Cailin Vargas regarding patients remaining po medication, Dr. Emilia Vo stated to D/c meds due to patient palliative care status.

## 2017-03-12 NOTE — PROGRESS NOTES
Bedside and Verbal shift change report given to assumed pt care. recived ptating in bed me. No acute distress. Pt appears to  be comfortable. Will  continue with comfort measures. 0746> Bedside and Verbal shift change report given to Hilary Lemons (oncoming nurse) by Shari Cutler   (offgoing nurse). Report included the following information SBAR, Kardex, Intake/Output and MAR.

## 2017-03-13 NOTE — PROGRESS NOTES
Patient to discharge to [facility] today. DC summary in e discharge. [facility] to transport at 100 pm.  Spoke to the patient ex wife and informed her that the patient will be return ing to [facility], she agreed with transfer.   Sabino Oppenheim, RN

## 2017-03-13 NOTE — PROGRESS NOTES
Patient is Comfort Care received in bed with eyes closed. Non verbal, unable to assess cognition or orientation. BI negative with no apparent signs of pain or distress. Bed locked in low position and call bell w/in reach. 1315- Patient with PICC line and dc to [facility]. Dr. Josette Joya was called said dc PICC.    1212- Pt discharge to [facility] accompanied by Medical transporter x2. Discharge instructions and SBAR report faxed to facility after report was called. No distress noted.

## 2017-03-13 NOTE — DISCHARGE INSTRUCTIONS
DISCHARGE SUMMARY from Nurse    The following personal items are in your possession at time of discharge:    Dental Appliances: None  Visual Aid: None     Home Medications: None  Jewelry: None  Clothing: None  Other Valuables: None  Personal Items Sent to Safe: none          PATIENT INSTRUCTIONS:    After general anesthesia or intravenous sedation, for 24 hours or while taking prescription Narcotics:  · Limit your activities  · Do not drive and operate hazardous machinery  · Do not make important personal or business decisions  · Do  not drink alcoholic beverages  · If you have not urinated within 8 hours after discharge, please contact your surgeon on call. Report the following to your surgeon:  · Excessive pain, swelling, redness or odor of or around the surgical area  · Temperature over 100.5  · Nausea and vomiting lasting longer than 4 hours or if unable to take medications  · Any signs of decreased circulation or nerve impairment to extremity: change in color, persistent  numbness, tingling, coldness or increase pain  · Any questions        What to do at Home:  Recommended activity: Ramona Hughes    If you experience any of the following symptoms listed in your teaching for Sepsis under \"When should you call for help\", please follow up with you doctor and/or call 911. *  Please give a list of your current medications to your Primary Care Provider. *  Please update this list whenever your medications are discontinued, doses are      changed, or new medications (including over-the-counter products) are added. *  Please carry medication information at all times in case of emergency situations. These are general instructions for a healthy lifestyle:    No smoking/ No tobacco products/ Avoid exposure to second hand smoke    Surgeon General's Warning:  Quitting smoking now greatly reduces serious risk to your health.     Obesity, smoking, and sedentary lifestyle greatly increases your risk for illness    A healthy diet, regular physical exercise & weight monitoring are important for maintaining a healthy lifestyle    You may be retaining fluid if you have a history of heart failure or if you experience any of the following symptoms:  Weight gain of 3 pounds or more overnight or 5 pounds in a week, increased swelling in our hands or feet or shortness of breath while lying flat in bed. Please call your doctor as soon as you notice any of these symptoms; do not wait until your next office visit. Recognize signs and symptoms of STROKE:    F-face looks uneven    A-arms unable to move or move unevenly    S-speech slurred or non-existent    T-time-call 911 as soon as signs and symptoms begin-DO NOT go       Back to bed or wait to see if you get better-TIME IS BRAIN. Warning Signs of HEART ATTACK     Call 911 if you have these symptoms:   Chest discomfort. Most heart attacks involve discomfort in the center of the chest that lasts more than a few minutes, or that goes away and comes back. It can feel like uncomfortable pressure, squeezing, fullness, or pain.  Discomfort in other areas of the upper body. Symptoms can include pain or discomfort in one or both arms, the back, neck, jaw, or stomach.  Shortness of breath with or without chest discomfort.  Other signs may include breaking out in a cold sweat, nausea, or lightheadedness. Don't wait more than five minutes to call 911 - MINUTES MATTER! Fast action can save your life. Calling 911 is almost always the fastest way to get lifesaving treatment. Emergency Medical Services staff can begin treatment when they arrive -- up to an hour sooner than if someone gets to the hospital by car. The discharge information sent with Patient to Aleida Kim for nurse to review.

## 2017-03-13 NOTE — PROGRESS NOTES
Progress Note      Patient: Lesia Quintanilla               Sex: male          DOA: 3/6/2017       YOB: 1935      Age:  80 y.o.        LOS:  LOS: 7 days               Subjective:   Moans, non verbal, appears comfortable    Objective:      Visit Vitals    /63 (BP 1 Location: Left arm, BP Patient Position: At rest)    Pulse 78    Temp 99.4 °F (37.4 °C)    Resp 16    Ht 6' 2\" (1.88 m)    Wt 71.9 kg (158 lb 8.2 oz)    SpO2 100%    BMI 20.35 kg/m2       Physical Exam:  Pt was drowsy when seen . heart reg rate and rhythm   Lungs  Good air movement   Abdomen soft and nontender   Neuro pt is not in any distress    Lab/Data Reviewed:  CMP: No results found for: NA, K, CL, CO2, AGAP, GLU, BUN, CREA, GFRAA, GFRNA, CA, MG, PHOS, ALB, TBIL, TP, ALB, GLOB, AGRAT, SGOT, ALT, GPT  CBC: No results found for: WBC, HGB, HGBEXT, HCT, HCTEXT, PLT, PLTEXT, HGBEXT, HCTEXT, PLTEXT        Assessment/Plan     Active Problems:    Respiratory failure ([facility] [address] (3/6/2017)      Sepsis ([facility] [address] (3/6/2017)      Encephalopathy acute (3/7/2017)      Debility (3/7/2017)        Plan:pt is on comfort measures . Palliative to f/u   Need DC plan for today if possible.

## 2017-03-13 NOTE — DISCHARGE SUMMARY
Discharge Summary    Patient: Abiola Joseph               Sex: male          DOA: 3/6/2017         YOB: 1935      Age:  80 y.o.        LOS:  LOS: 7 days                Admit Date: 3/6/2017    Discharge Date: 3/13/2017    Discharge Medications:     Current Discharge Medication List      START taking these medications    Details   atropine 1 % ophthalmic solution Apply 3 Drops to eye three (3) times daily as needed for Other (secretions). Qty: 5 mL, Refills: 0      bisacodyl (DULCOLAX) 10 mg suppository Insert 10 mg into rectum daily as needed. Qty: 28 Each, Refills: 0      scopolamine (TRANSDERM-SCOP) 1.5 mg (1 mg over 3 days) pt3d 1 Patch by TransDERmal route every seventy-two (72) hours. Qty: 10 Patch, Refills: 0      morphine (ROXANOL) 100 mg/5 mL (20 mg/mL) concentrated solution Take 0.5 mL by mouth every two (2) hours as needed for Pain.  Max Daily Amount: 120 mg.  Qty: 30 mL, Refills: 0         STOP taking these medications       rOPINIRole (REQUIP) 0.5 mg tablet Comments:   Reason for Stopping:         multivitamin (ONE A DAY) tablet Comments:   Reason for Stopping:         potassium 99 mg tablet Comments:   Reason for Stopping:         OTHER Comments:   Reason for Stopping:         OTHER Comments:   Reason for Stopping:         cyanocobalamin (VITAMIN B12) 1,000 mcg/mL injection Comments:   Reason for Stopping:         cyanocobalamin (VITAMIN B-12) 1,000 mcg tablet Comments:   Reason for Stopping:         Aspirin, Buffered 81 mg tab Comments:   Reason for Stopping:         metoprolol (LOPRESSOR) 25 mg tablet Comments:   Reason for Stopping:         diazepam (VALIUM) 5 mg tablet Comments:   Reason for Stopping:         tamsulosin (FLOMAX) 0.4 mg capsule Comments:   Reason for Stopping:         darifenacin (ENABLEX) 7.5 mg ER tablet Comments:   Reason for Stopping:         FLUoxetine (PROZAC) 40 mg capsule Comments:   Reason for Stopping:         pramipexole (MIRAPEX) 1.5 mg tablet Comments: Reason for Stopping:         METFORMIN HCL (METFORMIN PO) Comments:   Reason for Stopping:         OMEPRAZOLE (PRILOSEC PO) Comments:   Reason for Stopping:         atorvastatin (LIPITOR) 80 mg tablet Comments:   Reason for Stopping:         sitaGLIPtin (JANUVIA) 50 mg tablet Comments:   Reason for Stopping:         lisinopril (PRINIVIL, ZESTRIL) 2.5 mg tablet Comments:   Reason for Stopping: Follow-up:  PCP or in facility [address] [place] [address]    Discharge Condition: Stable    Activity: PT/OT Eval and Treat    Diet: Comfort feeding    Labs:  Labs: Results:       Chemistry Recent Labs      03/11/17   0310  03/10/17   1500   GLU  146*   --    NA  144   --    K  4.1  5.0   CL  111*   --    CO2  26   --    BUN  19*   --    CREA  1.15   --    CA  7.2*   --    AGAP  7   --    BUCR  17   --    AP  95   --    TP  4.7*   --    ALB  1.7*   --    GLOB  3.0   --    AGRAT  0.6*   --       CBC w/Diff Recent Labs      17   WBC  8.1   RBC  3.31*   HGB  10.7*   HCT  32.2*   PLT  164   GRANS  74*   LYMPH  15*   EOS  5      Cardiac Enzymes No results for input(s): CPK, CKND1, FABRICIO in the last 72 hours. No lab exists for component: CKRMB, TROIP   Coagulation No results for input(s): PTP, INR, APTT in the last 72 hours. No lab exists for component: INREXT    Lipid Panel Lab Results   Component Value Date/Time    Cholesterol, total 135 2012 05:25 AM    HDL Cholesterol 34 2012 05:25 AM    LDL, calculated 80.8 2012 05:25 AM    VLDL, calculated 20.2 2012 05:25 AM    Triglyceride 101 2012 05:25 AM    CHOL/HDL Ratio 4.0 2012 05:25 AM      BNP No results for input(s): BNPP in the last 72 hours. Liver Enzymes Recent Labs      17   TP  4.7*   ALB  1.7*   AP  95   SGOT  39*      Thyroid Studies Lab Results   Component Value Date/Time    T4, Total 7.8 2014 08:25 AM    TSH 3.08 2017 03:20 PM          Imagin.  Mildly motion limited examination, without evidence of acute intracranial  abnormality. Of note, noncontrast head CT can be normal in the context of early  acute stroke. 2. Unchanged subcortical and periventricular white matter hypoattenuation, a  nonspecific finding likely pertaining to chronic ischemic microvascular change. 3. Minimal, nonspecific mucosal thickening of the anterior and posterior ethmoid  air cells. Consults: Pulmonary/Critical Care and Palliative care    Treatment Team: Treatment Team: Attending Provider: Agueda Chopra MD; Consulting Provider: Agueda Chopra MD; Scribe: Anthony Fuller; Consulting Provider: Heidi Ferreira MD; Care Manager: David Martines; Consulting Provider: Gatito Yañez MD    Significant Diagnostic Studies: labs: No results found for this or any previous visit (from the past 24 hour(s)). Discharge diagnoses:    Problem List as of 3/13/2017  Date Reviewed: 2/25/2013          Codes Class Noted - Resolved    Encephalopathy acute ICD-10-CM: G93.40  ICD-9-CM: 348.30  3/7/2017 - Present        Debility ICD-10-CM: R53.81  ICD-9-CM: 799.3  3/7/2017 - Present        Respiratory failure ([facility] [address] ICD-10-CM: J96.90  ICD-9-CM: 518.81  3/6/2017 - Present        Sepsis ([facility] [address] ICD-10-CM: A41.9  ICD-9-CM: 038.9, 995.91  3/6/2017 - Present        Fall ICD-10-CM: W19. Elizabet Lay  ICD-9-CM: E888.9  1/20/2014 - Present        Depression ICD-10-CM: F32.9  ICD-9-CM: 571  1/20/2014 - Present        Laceration of eyebrow, right ICD-10-CM: S01.111A  ICD-9-CM: 873.42  1/20/2014 - Present        Facial contusion ICD-10-CM: U02.22AS  ICD-9-CM: 920  1/20/2014 - Present        Screening for colon cancer ICD-10-CM: Z12.11  ICD-9-CM: V76.51  2/25/2013 - Present        Hypertensive heart disease ICD-10-CM: I11.9  ICD-9-CM: 402.90  Unknown - Present        CVA (cerebral infarction) ICD-10-CM: I63.9  ICD-9-CM: 434.91  1/5/2012 - Present        DM (diabetes mellitus) ([facility] ICD-10-CM: E11.9  ICD-9-CM: 250.00  Unknown - Present        DM (diabetes mellitus) ([facility] [address] ICD-10-CM: E11.9  ICD-9-CM: 250.00  Unknown - Present        HLD (hyperlipidemia) ICD-10-CM: E78.5  ICD-9-CM: 272.4  Unknown - Present        TIA (transient ischemic attack) ICD-10-CM: G45.9  ICD-9-CM: 435.9  Unknown - Present        Herniated disc ICD-10-CM: [id number]  ICD-9-CM: 722.2  Unknown - Present    Overview Signed 1/5/2012  4:00 PM by Judie Sanchez MD     status post laminectomy and diskectomy surgery in January                 Hospital Course:   Major issues addressed during hospitalization outlined  below. Lizzy Pettit is a 80 y.o. male who presented to ED via EMS from [facility] unresponsive. Patient has a h/o HTN, DM, TIA, and stroke. He was found by staff. CPR was performed and EMS was called. EMS also found patient unresponsive. He was subsequently intubated. And brought in to the ED. While in the ED, patient was found to be severly hypotensive. He was given fluids, in which he responded. Patient will be admitted to the ICU    Patient was admitted to ICU and was patient was intubated. Started on pressors. Patient was started on broad spectrum abx eventually tailored for proteus that grew in Urine cx the source of sepsis. Continued to volume replete. Course was complicated by lactic acidosis 2/2 sepsis and REID 2/2 volume depletion. Elevated trop 2/2 severe hypotension and cpr. Eventually patient improved medically but his encephalopathy did not improve dramatically. Patient was extubated after palliative consult. Patient did well off vent. Started on comfort medications and on 3/13 was safe for discharge to sNF.     Annie Fernandez MD  March 13, 2017        Total time spent 40 minutes

## 2017-03-13 NOTE — ROUTINE PROCESS
[facility] [address] [facility]                                                                        80 y.o.   male    [address] [facility]   Room: 2112/01    [facility] 2W NEURO MED  Unit Phone# :  [phone]      [facility] 2W NEURO MED  [address] [place] [address]  Dept: [phone]  Loc: [phone]                    SITUATION     Admitted:  3/6/2017         Attending Provider:  No att. providers found       Consultations:  IP CONSULT TO HOSPITALIST  IP CONSULT TO INTENSIVIST  IP CONSULT TO Tala Rojas    PCP:  Drea Parisi MD   [phone]    Treatment Team: Consulting Provider: Payton Elena MD; Scribe: Jackie Ribera; Consulting Provider: Anu Rader MD; Care Manager: June Duet    Admitting Dx:  Respiratory failure ([facility] [address]  Sepsis ([facility] [address]       Principal Problem: <principal problem not specified>    * No surgery found * of      BY: * Surgery not found *             ON: * No surgery found *                  Code Status: DNR                Advance Directives:   Advance Care Planning 3/7/2017   Patient's Healthcare Decision Maker is: Legal Next of [place] 71   Primary Decision Maker Name Aster Green   Primary Decision Maker Phone Number [phone] / [phone]   Primary Decision Maker Relationship to Patient (No Data)   Secondary Decision Maker Name [address] [place] [address] Phone Number [phone]   Secondary Decision Maker Relationship to Patient Friend   Confirm Advance Directive Yes, not on file    (Send w/patient)   No Doesnt Have       Isolation:  There are currently no Active Isolations       MDRO: MRSA    Pain Medications given:  none    Last dose: none at  na    Special Equipment needed: yes  Type of equipment: sandoval    hemodialysis - Not currently on dialysis       BACKGROUND     Allergies:   Allergies   Allergen Reactions    Sulfa (Sulfonamide Antibiotics) Unknown (comments)    Sulfa (Sulfonamide Antibiotics) Unknown (comments)       Past Medical History:   Diagnosis Date    Chronic back pain     s/p Leminectomy and discectomy (2011)    Depression     Diabetes mellitus     Herniated disc     status post laminectomy and diskectomy surgery in January    Hyperlipidemia     Hypertension     Hypertensive heart disease     Kidney stones     Nephrocalcinosis     Other ill-defined conditions(799.89)     AFIB    PVC (premature ventricular contraction)     Restless leg syndrome     Stroke ([facility] [address] 6/7/12    TIA    TIA (transient ischemic attack)        Past Surgical History:   Procedure Laterality Date    HX LUMBAR DISKECTOMY  january 2011    LAMINECTOMY,LUMBAR      LITHOTRIPSY         No prescriptions prior to admission. Hard scripts included in transfer packet yes    Vaccinations:    Immunization History   Administered Date(s) Administered    Influenza Vaccine 10/08/2016       Readmission Risks:    Known Risks: unknown        The Charlson CoMorbitiy Index tool is an evidenced based tool that has more automatic generated information. The tool looks at many different items such as the age of the patient, how many times they were admitted in the last calendar year, current length of stay in the hospital and their diagnosis. All of these items are pulled automatically from information documented in the chart from various places and will generate a score that predicts whether a patient is at low (less than 13), medium (13-20) or high (21 or greater) risk of being readmitted.         ASSESSMENT                Temp: 98.4 °F (36.9 °C) (03/13/17 1318) Pulse (Heart Rate): 79 (03/13/17 1318)     Resp Rate: 16 (03/13/17 1318)           BP: 110/83 (03/13/17 1318)     O2 Sat (%): 99 % (03/13/17 1318)     Weight: 71.9 kg (158 lb 8.2 oz)    Height: 6' 2\" (188 cm) (03/09/17 9569)       If above not within 1 hour of discharge:    13:18 BP: 110/83  P: 79  R: 16 T: 98.4 O2 Sat: 99%  O2: 6LNC with hum    Active Orders   There are no active orders of the following type(s): Diet.          Orientation: disoriented     Active Behaviors: None                                   Active Lines/Drains:  Smith     Urinary Status: Smith     Last BM: Last Bowel Movement Date: 03/12/17     Skin Integrity: Intact             Mobility: Very limited   Weight Bearing Status: NWB (Non Weight Bearing)                Lab Results   Component Value Date/Time    Glucose 146 03/11/2017 03:10 AM    Hemoglobin A1c 6.2 03/06/2017 03:15 PM    INR 1.4 03/08/2017 03:00 AM    INR 1.4 03/07/2017 10:15 AM    HGB 10.7 03/11/2017 03:10 AM    HGB 11.2 03/10/2017 04:00 AM        RECOMMENDATION     See After Visit Summary (AVS) for:  · Discharge instructions  · After [address]   · Special equipment needed (entered pre-discharge by Care Management)  · Medication Reconciliation    · Follow up Appointment(s)         Report given/sent by:  Fallon Banks RN                    Verbal report given to: Valerie Hussein LPN  FAXED to:  unknown       Estimated discharge time:  3/13/2017 at [phone]- [phone]

## 2017-03-13 NOTE — PROGRESS NOTES
Assumed pt care. Received pt resting in bed, no s/s of distress. Will continue with comfort measures. 0736>  Bedside and Verbal shift change report given to Santhosh Saeed (oncoming nurse) by Katherine Chan  (offgoing nurse).  Report included the following information SBAR, Kardex, Intake/Output and MAR

## 2017-03-13 NOTE — PROGRESS NOTES
Care Management Interventions  PCP Verified by CM:  Yes  Mode of Transport at Discharge: [address]  [address] Time of Discharge: [address] (CM Consult): SNF  MyChart Signup: No  Discharge Durable Medical Equipment: No  Physical Therapy Consult: Yes  Occupational Therapy Consult: No  Speech Therapy Consult: No  Current Support Network: Nursing Facility  Confirm Follow Up Transport: Other (see comment)  Plan discussed with Pt/Family/Caregiver: Yes  Freedom of Choice Offered: Yes  Discharge Location  Discharge Placement: Skilled nursing facility

## 2017-03-21 ENCOUNTER — HOME CARE VISIT (OUTPATIENT)
Dept: HOSPICE | Facility: HOSPICE | Age: 82
End: 2017-03-21
Payer: MEDICARE

## 2017-03-21 VITALS
SYSTOLIC BLOOD PRESSURE: 80 MMHG | DIASTOLIC BLOOD PRESSURE: 42 MMHG | TEMPERATURE: 100.4 F | RESPIRATION RATE: 24 BRPM | OXYGEN SATURATION: 82 % | HEART RATE: 88 BPM

## 2017-03-21 PROCEDURE — G0299 HHS/HOSPICE OF RN EA 15 MIN: HCPCS

## 2025-02-20 NOTE — IP AVS SNAPSHOT
Current Discharge Medication List  
  
Take these medications at their scheduled times Dose & Instructions Dispensing Information Comments Morning Noon Evening Bedtime  
 scopolamine 1.5 mg (1 mg over 3 days) Pt3d Commonly known as:  TRANSDERM-SCOP Your next dose is: Today, Tomorrow Comments:  __________ Dose:  1.5 mg  
1 Patch by TransDERmal route every seventy-two (72) hours. Quantity:  10 Patch Refills:  0 Take these medications as needed Dose & Instructions Dispensing Information Comments Morning Noon Evening Bedtime  
 atropine 1 % ophthalmic solution Your next dose is: Today, Tomorrow Comments:  __________ Dose:  3 Drop Apply 3 Drops to eye three (3) times daily as needed for Other (secretions). Quantity:  5 mL Refills:  0  
     
   
   
   
  
 bisacodyl 10 mg suppository Commonly known as:  DULCOLAX Your next dose is: Today, Tomorrow Comments:  __________ Dose:  10 mg Insert 10 mg into rectum daily as needed. Quantity:  28 Each Refills:  0  
     
   
   
   
  
 morphine 100 mg/5 mL (20 mg/mL) concentrated solution Commonly known as:  Lynda Clark Your next dose is: Today, Tomorrow Comments:  __________ Dose:  10 mg Take 0.5 mL by mouth every two (2) hours as needed for Pain. Max Daily Amount: 120 mg.  
 Quantity:  30 mL Refills:  0 Where to Get Your Medications Information about where to get these medications is not yet available ! Ask your nurse or doctor about these medications  
  atropine 1 % ophthalmic solution  
 bisacodyl 10 mg suppository  
 morphine 100 mg/5 mL (20 mg/mL) concentrated solution  
 scopolamine 1.5 mg (1 mg over 3 days) Pt3d Detail Level: Detailed